# Patient Record
Sex: FEMALE | Race: WHITE
[De-identification: names, ages, dates, MRNs, and addresses within clinical notes are randomized per-mention and may not be internally consistent; named-entity substitution may affect disease eponyms.]

---

## 2017-04-11 ENCOUNTER — PRIOR ORIGINAL RECORDS (OUTPATIENT)
Dept: OTHER | Age: 72
End: 2017-04-11

## 2017-04-11 ENCOUNTER — OFFICE VISIT (OUTPATIENT)
Dept: HEMATOLOGY/ONCOLOGY | Facility: HOSPITAL | Age: 72
End: 2017-04-11
Attending: INTERNAL MEDICINE
Payer: MEDICARE

## 2017-04-11 ENCOUNTER — OFFICE VISIT (OUTPATIENT)
Dept: SURGERY | Facility: CLINIC | Age: 72
End: 2017-04-11

## 2017-04-11 VITALS
WEIGHT: 116 LBS | DIASTOLIC BLOOD PRESSURE: 82 MMHG | SYSTOLIC BLOOD PRESSURE: 136 MMHG | RESPIRATION RATE: 18 BRPM | HEART RATE: 70 BPM | OXYGEN SATURATION: 97 %

## 2017-04-11 VITALS — BODY MASS INDEX: 22.48 KG/M2 | HEIGHT: 60.04 IN | WEIGHT: 116 LBS

## 2017-04-11 DIAGNOSIS — Z51.11 ENCOUNTER FOR ANTINEOPLASTIC CHEMOTHERAPY: ICD-10-CM

## 2017-04-11 DIAGNOSIS — C77.3 BREAST CANCER METASTASIZED TO AXILLARY LYMPH NODE, RIGHT (HCC): Primary | ICD-10-CM

## 2017-04-11 DIAGNOSIS — C50.411 BREAST CANCER OF UPPER-OUTER QUADRANT OF RIGHT FEMALE BREAST (HCC): Primary | ICD-10-CM

## 2017-04-11 DIAGNOSIS — C50.911 BREAST CANCER METASTASIZED TO AXILLARY LYMPH NODE, RIGHT (HCC): Primary | ICD-10-CM

## 2017-04-11 PROBLEM — C50.919 BREAST CANCER METASTASIZED TO AXILLARY LYMPH NODE (HCC): Status: ACTIVE | Noted: 2017-04-11

## 2017-04-11 PROCEDURE — 83615 LACTATE (LD) (LDH) ENZYME: CPT

## 2017-04-11 PROCEDURE — 80053 COMPREHEN METABOLIC PANEL: CPT

## 2017-04-11 PROCEDURE — 85025 COMPLETE CBC W/AUTO DIFF WBC: CPT

## 2017-04-11 PROCEDURE — 99204 OFFICE O/P NEW MOD 45 MIN: CPT | Performed by: SURGERY

## 2017-04-11 PROCEDURE — 99205 OFFICE O/P NEW HI 60 MIN: CPT | Performed by: INTERNAL MEDICINE

## 2017-04-11 RX ORDER — LISINOPRIL 20 MG/1
20 TABLET ORAL DAILY
COMMUNITY
End: 2017-05-01 | Stop reason: DRUGHIGH

## 2017-04-11 RX ORDER — ATORVASTATIN CALCIUM 10 MG/1
10 TABLET, FILM COATED ORAL NIGHTLY
COMMUNITY
End: 2021-02-17

## 2017-04-11 RX ORDER — ASPIRIN 81 MG/1
81 TABLET, CHEWABLE ORAL DAILY
COMMUNITY
End: 2021-02-17

## 2017-04-11 NOTE — H&P
History provided by:patient and daughter  HPI:     HPI  The patient is a 75-year-old female seen at the request of her oncologist regarding a new diagnosis of metastatic right breast cancer. The patient lives in [place] during the winter.   She had a TIA Comment: 1 glass of wine per day    Allergies/Medications: Allergies: No Known Allergies      Review of Systems:   Review of Systems   Allergic/Immuno:  Review of patient's allergies performed.   Cardiovascular:  Negative for cool extremity and irregul exhibits no inverted nipple, no mass, no nipple discharge, no skin change and no tenderness. Breasts are symmetrical.   Genitourinary: No breast swelling.    Lymphadenopathy:        Head (right side): No submental, no submandibular, no preauricular, no post deep pectoral lymph nodes. Assessment/Plan:   Breast cancer metastatic to axillary lymph nodes    · I had a lengthy discussion with the patient and her daughter regarding the diagnosis of breast cancer.   We discussed the biology of breast cancer as José Vela MD

## 2017-04-12 ENCOUNTER — TELEPHONE (OUTPATIENT)
Dept: HEMATOLOGY/ONCOLOGY | Facility: HOSPITAL | Age: 72
End: 2017-04-12

## 2017-04-12 NOTE — CONSULTS
Cancer Center Report of Consultation    Patient Name: Will Sotelo   YOB: 1945   Medical Record Number: [id number]   CSN: [id number]   Consulting Physician: Darrick Fuentes MD  Referring Physician(s): No ref.  provider found  Date of Consult Cigarettes    Smokeless tobacco: Never Used    Alcohol Use: Yes  0.0 oz/week    0 Standard drinks or equivalent per week         Comment: 1 glass of wine per day    Drug Use: No    Sexual Activity: Not on file   Not on file  Other Topics Concern   None on Lab Results  Component Value Date    04/11/2017   K 3.9 04/11/2017    04/11/2017   CO2 30.0 04/11/2017   BUN 7* 04/11/2017   CREATSERUM 0.63 04/11/2017   GLU 83 04/11/2017   CA 9.4 04/11/2017   ALKPHO 81 04/11/2017   ALT 19 04/11/2017   A

## 2017-04-12 NOTE — TELEPHONE ENCOUNTER
Phoned patient and assisted with scheduling apts for  cario-oncology apt 4/20, echocardiogram 4/18, CT scan 4/18, chemotherapy education 4/20, and chemotherapy 4/24. Phoned Dr. Alejandrina Valdes office to schedule port placement for 4/21.  Patient with verbal unders

## 2017-04-12 NOTE — PROGRESS NOTES
Met with patient today in clinic and discussed newly diagnosed breast cancer. Introduced myself and explained my role as the breast navigator.  Explained the role of the physicians on her breast cancer care team. Reviewed over pathology report and discussed

## 2017-04-18 ENCOUNTER — OFFICE VISIT (OUTPATIENT)
Dept: HEMATOLOGY/ONCOLOGY | Facility: HOSPITAL | Age: 72
End: 2017-04-18
Attending: INTERNAL MEDICINE
Payer: MEDICARE

## 2017-04-18 ENCOUNTER — HOSPITAL ENCOUNTER (OUTPATIENT)
Dept: CT IMAGING | Facility: HOSPITAL | Age: 72
Discharge: HOME | End: 2017-04-18
Attending: INTERNAL MEDICINE
Payer: MEDICARE

## 2017-04-18 ENCOUNTER — HOSPITAL ENCOUNTER (OUTPATIENT)
Dept: CV DIAGNOSTICS | Facility: HOSPITAL | Age: 72
Discharge: HOME | End: 2017-04-18
Attending: INTERNAL MEDICINE
Payer: MEDICARE

## 2017-04-18 DIAGNOSIS — C50.411 BREAST CANCER OF UPPER-OUTER QUADRANT OF RIGHT FEMALE BREAST (HCC): ICD-10-CM

## 2017-04-18 DIAGNOSIS — Z71.89 OTHER SPECIFIED COUNSELING: ICD-10-CM

## 2017-04-18 DIAGNOSIS — C50.911 BREAST CANCER METASTASIZED TO AXILLARY LYMPH NODE, RIGHT (HCC): Primary | ICD-10-CM

## 2017-04-18 DIAGNOSIS — Z51.11 ENCOUNTER FOR ANTINEOPLASTIC CHEMOTHERAPY: ICD-10-CM

## 2017-04-18 DIAGNOSIS — C77.3 BREAST CANCER METASTASIZED TO AXILLARY LYMPH NODE, RIGHT (HCC): Primary | ICD-10-CM

## 2017-04-18 PROCEDURE — 74177 CT ABD & PELVIS W/CONTRAST: CPT

## 2017-04-18 PROCEDURE — 71260 CT THORAX DX C+: CPT

## 2017-04-18 PROCEDURE — 99215 OFFICE O/P EST HI 40 MIN: CPT | Performed by: CLINICAL NURSE SPECIALIST

## 2017-04-18 PROCEDURE — 93306 TTE W/DOPPLER COMPLETE: CPT | Performed by: CLINICAL NURSE SPECIALIST

## 2017-04-18 PROCEDURE — 93306 TTE W/DOPPLER COMPLETE: CPT

## 2017-04-18 RX ORDER — DEXAMETHASONE 4 MG/1
8 TABLET ORAL 2 TIMES DAILY WITH MEALS
Qty: 24 TABLET | Refills: 4 | Status: SHIPPED | OUTPATIENT
Start: 2017-04-18 | End: 2017-08-14 | Stop reason: ALTCHOICE

## 2017-04-18 RX ORDER — ONDANSETRON HYDROCHLORIDE 8 MG/1
8 TABLET, FILM COATED ORAL EVERY 8 HOURS PRN
Qty: 30 TABLET | Refills: 3 | Status: SHIPPED | OUTPATIENT
Start: 2017-04-18 | End: 2017-07-01

## 2017-04-18 RX ORDER — PROCHLORPERAZINE MALEATE 10 MG
10 TABLET ORAL EVERY 6 HOURS PRN
Qty: 30 TABLET | Refills: 3 | Status: SHIPPED | OUTPATIENT
Start: 2017-04-18 | End: 2017-04-18

## 2017-04-19 DIAGNOSIS — C50.911 BREAST CANCER METASTASIZED TO AXILLARY LYMPH NODE, RIGHT (HCC): Primary | ICD-10-CM

## 2017-04-19 DIAGNOSIS — C77.3 BREAST CANCER METASTASIZED TO AXILLARY LYMPH NODE, RIGHT (HCC): Primary | ICD-10-CM

## 2017-04-20 ENCOUNTER — PRIOR ORIGINAL RECORDS (OUTPATIENT)
Dept: OTHER | Age: 72
End: 2017-04-20

## 2017-04-20 ENCOUNTER — APPOINTMENT (OUTPATIENT)
Dept: HEMATOLOGY/ONCOLOGY | Facility: HOSPITAL | Age: 72
End: 2017-04-20
Attending: INTERNAL MEDICINE
Payer: MEDICARE

## 2017-04-20 PROBLEM — Z71.89 OTHER SPECIFIED COUNSELING: Status: ACTIVE | Noted: 2017-04-20

## 2017-04-20 RX ORDER — PROCHLORPERAZINE MALEATE 10 MG
TABLET ORAL
Qty: 368 TABLET | Refills: 3 | OUTPATIENT
Start: 2017-04-20

## 2017-04-20 RX ORDER — PROCHLORPERAZINE MALEATE 10 MG
TABLET ORAL
Qty: 90 TABLET | Refills: 3 | Status: SHIPPED | OUTPATIENT
Start: 2017-04-20 | End: 2017-07-17 | Stop reason: ALTCHOICE

## 2017-04-20 NOTE — PROGRESS NOTES
IV Chemotherapy Education    Patient:Geovanna Marie    Date:  4/17/17  Diagnosis: breast cancer   Caregivers present: daughter    Drug names:  Taxotere, Carboplatin, Herceptin, Perjeta    Treatment Effects on Bone Marrow:  Chemotherapy action on cancer / diet, and activities. Chemotherapy Consent Form signed by the patient.   I spent a total of 60 minutes with the patient, 100 % of that time was spent counseling patient regarding the above documented side effects and management, when to call provider a

## 2017-04-21 ENCOUNTER — HOSPITAL ENCOUNTER (OUTPATIENT)
Dept: MAMMOGRAPHY | Age: 72
Discharge: HOME | End: 2017-04-21
Attending: CLINICAL NURSE SPECIALIST
Payer: MEDICARE

## 2017-04-21 ENCOUNTER — HOSPITAL ENCOUNTER (OUTPATIENT)
Dept: ULTRASOUND IMAGING | Age: 72
Discharge: HOME | End: 2017-04-21
Attending: CLINICAL NURSE SPECIALIST
Payer: MEDICARE

## 2017-04-21 DIAGNOSIS — C50.911 BREAST CANCER METASTASIZED TO AXILLARY LYMPH NODE, RIGHT (HCC): ICD-10-CM

## 2017-04-21 DIAGNOSIS — C77.3 BREAST CANCER METASTASIZED TO AXILLARY LYMPH NODE, RIGHT (HCC): ICD-10-CM

## 2017-04-21 PROCEDURE — 77065 DX MAMMO INCL CAD UNI: CPT

## 2017-04-21 PROCEDURE — 88305 TISSUE EXAM BY PATHOLOGIST: CPT | Performed by: CLINICAL NURSE SPECIALIST

## 2017-04-21 PROCEDURE — 19083 BX BREAST 1ST LESION US IMAG: CPT

## 2017-04-21 PROCEDURE — 88377 M/PHMTRC ALYS ISHQUANT/SEMIQ: CPT | Performed by: CLINICAL NURSE SPECIALIST

## 2017-04-21 PROCEDURE — 88360 TUMOR IMMUNOHISTOCHEM/MANUAL: CPT | Performed by: CLINICAL NURSE SPECIALIST

## 2017-04-21 NOTE — IMAGING NOTE
Pt has a known breast cancer and is starting chemotherapy on Monday. Port placed in L chest yesterday and pt still in pain.   Assisted Dr. Peterson Coyne with US guided breast biopsy of satellite mass at 0930 in right breast.  Procedure explained, all questions ans

## 2017-04-24 ENCOUNTER — OFFICE VISIT (OUTPATIENT)
Dept: HEMATOLOGY/ONCOLOGY | Facility: HOSPITAL | Age: 72
End: 2017-04-24
Attending: INTERNAL MEDICINE
Payer: MEDICARE

## 2017-04-24 ENCOUNTER — SOCIAL WORK SERVICES (OUTPATIENT)
Dept: HEMATOLOGY/ONCOLOGY | Facility: HOSPITAL | Age: 72
End: 2017-04-24

## 2017-04-24 VITALS
WEIGHT: 117.63 LBS | OXYGEN SATURATION: 95 % | SYSTOLIC BLOOD PRESSURE: 143 MMHG | BODY MASS INDEX: 23 KG/M2 | HEART RATE: 74 BPM | TEMPERATURE: 98 F | RESPIRATION RATE: 18 BRPM | DIASTOLIC BLOOD PRESSURE: 83 MMHG

## 2017-04-24 DIAGNOSIS — C50.911 BREAST CANCER METASTASIZED TO AXILLARY LYMPH NODE, RIGHT (HCC): Primary | ICD-10-CM

## 2017-04-24 DIAGNOSIS — C77.3 BREAST CANCER METASTASIZED TO AXILLARY LYMPH NODE, RIGHT (HCC): Primary | ICD-10-CM

## 2017-04-24 PROCEDURE — 85025 COMPLETE CBC W/AUTO DIFF WBC: CPT

## 2017-04-24 PROCEDURE — 96417 CHEMO IV INFUS EACH ADDL SEQ: CPT

## 2017-04-24 PROCEDURE — 80053 COMPREHEN METABOLIC PANEL: CPT

## 2017-04-24 PROCEDURE — 96413 CHEMO IV INFUSION 1 HR: CPT

## 2017-04-24 PROCEDURE — 99214 OFFICE O/P EST MOD 30 MIN: CPT | Performed by: INTERNAL MEDICINE

## 2017-04-24 PROCEDURE — 96375 TX/PRO/DX INJ NEW DRUG ADDON: CPT

## 2017-04-24 RX ORDER — DIPHENHYDRAMINE HCL 25 MG
25 CAPSULE ORAL ONCE
Status: COMPLETED | OUTPATIENT
Start: 2017-04-24 | End: 2017-04-24

## 2017-04-24 RX ORDER — PANTOPRAZOLE SODIUM 40 MG/1
40 TABLET, DELAYED RELEASE ORAL
Qty: 30 TABLET | Refills: 6 | Status: SHIPPED | OUTPATIENT
Start: 2017-04-24 | End: 2018-06-21

## 2017-04-24 RX ORDER — ALBUTEROL SULFATE 90 UG/1
AEROSOL, METERED RESPIRATORY (INHALATION) EVERY 6 HOURS PRN
COMMUNITY
End: 2021-02-17

## 2017-04-24 RX ORDER — ACETAMINOPHEN 325 MG/1
650 TABLET ORAL ONCE
Status: COMPLETED | OUTPATIENT
Start: 2017-04-24 | End: 2017-04-24

## 2017-04-24 RX ORDER — ACETAMINOPHEN 500 MG
1000 TABLET ORAL ONCE
Status: COMPLETED | OUTPATIENT
Start: 2017-04-24 | End: 2017-04-24

## 2017-04-24 RX ORDER — SUCRALFATE ORAL 1 G/10ML
1 SUSPENSION ORAL
COMMUNITY
End: 2017-06-30 | Stop reason: ALTCHOICE

## 2017-04-24 RX ORDER — PANTOPRAZOLE SODIUM 40 MG/1
40 TABLET, DELAYED RELEASE ORAL
COMMUNITY
End: 2017-04-24

## 2017-04-24 RX ADMIN — ACETAMINOPHEN 650 MG: 325 TABLET ORAL at 10:45:00

## 2017-04-24 RX ADMIN — ACETAMINOPHEN 1000 MG: 500 MG TABLET ORAL at 16:01:00

## 2017-04-24 RX ADMIN — DIPHENHYDRAMINE HCL 25 MG: 25 MG CAPSULE ORAL at 10:45:00

## 2017-04-24 NOTE — PROGRESS NOTES
Education Record    Learner:  Patient    Disease / Emy Moses cancer metastasized to axillary lymph node, right    Barriers / Limitations:  None   Comments:    Method:  Brief focused   Comments:    General Topics:  Infection, Medication, Pain, Precau

## 2017-04-24 NOTE — PROGRESS NOTES
Cancer Center Progress Note    Problem List:      Patient Active Problem List:     De Quervain's syndrome (tenosynovitis)     Osteopenia     Breast cancer metastasized to axillary lymph node ([address]     Other specified counseling      Interim History:    She mg by mouth daily. Disp:  Rfl:    Atorvastatin Calcium 10 MG Oral Tab Take 10 mg by mouth nightly. Disp:  Rfl:    aspirin 81 MG Oral Chew Tab Chew by mouth daily.  Disp:  Rfl:          Vital Signs:      Height: --  Weight: 53.343 kg (117 lb 9.6 oz) (04/24 0 these. She is ready to start the chemotherapy. She was instructed to call us if she has any concerns. I will have her get a weekly cbc with the first cycle. She will see the APN at day 8.  I will have her go back on the ASA 81 mg daily but we will watch her

## 2017-04-25 ENCOUNTER — TELEPHONE (OUTPATIENT)
Dept: HEMATOLOGY/ONCOLOGY | Facility: HOSPITAL | Age: 72
End: 2017-04-25

## 2017-04-25 NOTE — TELEPHONE ENCOUNTER
Date of Treatment: 4/24/17                               Type of Chemo: TCHP + Neulasta OnPro    Comments: LM for patient at listed number- identified on voice mail.     Recommendations: Encouraged patient to call back Dr. Sandra Sotelo triage nurses with any issues

## 2017-04-27 ENCOUNTER — TELEPHONE (OUTPATIENT)
Dept: HEMATOLOGY/ONCOLOGY | Facility: HOSPITAL | Age: 72
End: 2017-04-27

## 2017-04-27 ENCOUNTER — HOSPITAL ENCOUNTER (OUTPATIENT)
Dept: GENERAL RADIOLOGY | Facility: HOSPITAL | Age: 72
Discharge: HOME | End: 2017-04-27
Attending: CLINICAL NURSE SPECIALIST
Payer: MEDICARE

## 2017-04-27 ENCOUNTER — OFFICE VISIT (OUTPATIENT)
Dept: HEMATOLOGY/ONCOLOGY | Facility: HOSPITAL | Age: 72
End: 2017-04-27
Attending: INTERNAL MEDICINE
Payer: MEDICARE

## 2017-04-27 VITALS
BODY MASS INDEX: 22.96 KG/M2 | TEMPERATURE: 98 F | DIASTOLIC BLOOD PRESSURE: 69 MMHG | WEIGHT: 118.5 LBS | OXYGEN SATURATION: 95 % | HEIGHT: 60.04 IN | RESPIRATION RATE: 18 BRPM | HEART RATE: 77 BPM | SYSTOLIC BLOOD PRESSURE: 133 MMHG

## 2017-04-27 DIAGNOSIS — C50.911 BREAST CANCER METASTASIZED TO AXILLARY LYMPH NODE, RIGHT (HCC): ICD-10-CM

## 2017-04-27 DIAGNOSIS — C50.911 BREAST CANCER METASTASIZED TO AXILLARY LYMPH NODE, RIGHT (HCC): Primary | ICD-10-CM

## 2017-04-27 DIAGNOSIS — C77.3 BREAST CANCER METASTASIZED TO AXILLARY LYMPH NODE, RIGHT (HCC): Primary | ICD-10-CM

## 2017-04-27 DIAGNOSIS — C50.411 MALIGNANT NEOPLASM OF UPPER-OUTER QUADRANT OF RIGHT FEMALE BREAST, UNSPECIFIED ESTROGEN RECEPTOR STATUS (HCC): Primary | ICD-10-CM

## 2017-04-27 DIAGNOSIS — R06.02 SHORTNESS OF BREATH: ICD-10-CM

## 2017-04-27 DIAGNOSIS — C77.3 BREAST CANCER METASTASIZED TO AXILLARY LYMPH NODE, RIGHT (HCC): ICD-10-CM

## 2017-04-27 DIAGNOSIS — J44.9 CHRONIC OBSTRUCTIVE PULMONARY DISEASE, UNSPECIFIED COPD TYPE (HCC): ICD-10-CM

## 2017-04-27 LAB
ALBUMIN: 3.8 G/DL
ALKALINE PHOSPHATATE(ALK PHOS): 81 IU/L
BILIRUBIN TOTAL: 0.3 MG/DL
BUN: 7 MG/DL
CALCIUM: 9.4 MG/DL
CHLORIDE: 106 MEQ/L
CREATININE, SERUM: 0.63 MG/DL
GLUCOSE: 83 MG/DL
HEMATOCRIT: 39.7 %
HEMOGLOBIN: 13.2 G/DL
LDH: 160 IU/L
PLATELETS: 266 K/UL
POTASSIUM, SERUM: 3.9 MEQ/L
PROTEIN, TOTAL: 7.4 G/DL
RED BLOOD COUNT: 4.25 X 10-6/U
SGOT (AST): 18 IU/L
SGPT (ALT): 19 IU/L
SODIUM: 140 MEQ/L
WHITE BLOOD COUNT: 10 X 10-3/U

## 2017-04-27 PROCEDURE — 99213 OFFICE O/P EST LOW 20 MIN: CPT | Performed by: CLINICAL NURSE SPECIALIST

## 2017-04-27 PROCEDURE — 71020 XR CHEST PA + LAT CHEST (CPT=71020): CPT

## 2017-04-27 NOTE — TELEPHONE ENCOUNTER
Phoned patient with breast biopsy results from right breast biopsy done on 4/21. Explained that the area biopsied was found to be Her 2 positive breast cancer and like the other previous sites that were biopsied.  Patient started chemotherapy on 4/24 and as

## 2017-04-30 ENCOUNTER — SNF/IP PROF CHARGE ONLY (OUTPATIENT)
Dept: HEMATOLOGY/ONCOLOGY | Facility: HOSPITAL | Age: 72
End: 2017-04-30

## 2017-04-30 DIAGNOSIS — C77.3 BREAST CANCER METASTASIZED TO AXILLARY LYMPH NODE, RIGHT (HCC): Primary | ICD-10-CM

## 2017-04-30 DIAGNOSIS — C50.911 BREAST CANCER METASTASIZED TO AXILLARY LYMPH NODE, RIGHT (HCC): Primary | ICD-10-CM

## 2017-04-30 PROCEDURE — G9678 ONCOLOGY CARE MODEL SERVICE: HCPCS | Performed by: INTERNAL MEDICINE

## 2017-05-01 ENCOUNTER — OFFICE VISIT (OUTPATIENT)
Dept: HEMATOLOGY/ONCOLOGY | Facility: HOSPITAL | Age: 72
End: 2017-05-01
Attending: INTERNAL MEDICINE
Payer: MEDICARE

## 2017-05-01 VITALS
HEIGHT: 60.04 IN | HEART RATE: 74 BPM | BODY MASS INDEX: 21.8 KG/M2 | DIASTOLIC BLOOD PRESSURE: 46 MMHG | OXYGEN SATURATION: 94 % | WEIGHT: 112.5 LBS | SYSTOLIC BLOOD PRESSURE: 93 MMHG | RESPIRATION RATE: 18 BRPM | TEMPERATURE: 99 F

## 2017-05-01 DIAGNOSIS — C77.3 BREAST CANCER METASTASIZED TO AXILLARY LYMPH NODE, RIGHT (HCC): ICD-10-CM

## 2017-05-01 DIAGNOSIS — M79.606 LEG PAIN: Primary | ICD-10-CM

## 2017-05-01 DIAGNOSIS — C50.911 BREAST CANCER METASTASIZED TO AXILLARY LYMPH NODE, RIGHT (HCC): ICD-10-CM

## 2017-05-01 DIAGNOSIS — R25.2 LEG CRAMPING: ICD-10-CM

## 2017-05-01 DIAGNOSIS — C50.911 BREAST CANCER METASTASIZED TO AXILLARY LYMPH NODE, RIGHT (HCC): Primary | ICD-10-CM

## 2017-05-01 DIAGNOSIS — C77.3 BREAST CANCER METASTASIZED TO AXILLARY LYMPH NODE, RIGHT (HCC): Primary | ICD-10-CM

## 2017-05-01 PROCEDURE — 99214 OFFICE O/P EST MOD 30 MIN: CPT | Performed by: NURSE PRACTITIONER

## 2017-05-01 PROCEDURE — 85025 COMPLETE CBC W/AUTO DIFF WBC: CPT

## 2017-05-01 PROCEDURE — 96360 HYDRATION IV INFUSION INIT: CPT

## 2017-05-01 PROCEDURE — 80048 BASIC METABOLIC PNL TOTAL CA: CPT

## 2017-05-01 PROCEDURE — 85378 FIBRIN DEGRADE SEMIQUANT: CPT

## 2017-05-01 PROCEDURE — 85027 COMPLETE CBC AUTOMATED: CPT

## 2017-05-01 PROCEDURE — 96361 HYDRATE IV INFUSION ADD-ON: CPT

## 2017-05-01 PROCEDURE — 85007 BL SMEAR W/DIFF WBC COUNT: CPT

## 2017-05-01 RX ORDER — DIAZEPAM 5 MG/1
TABLET ORAL
Refills: 0 | COMMUNITY
Start: 2017-04-03 | End: 2017-05-15

## 2017-05-01 RX ORDER — FLUTICASONE PROPIONATE 50 MCG
SPRAY, SUSPENSION (ML) NASAL AS NEEDED
COMMUNITY
Start: 2017-03-18 | End: 2018-04-05

## 2017-05-01 RX ORDER — LISINOPRIL 10 MG/1
TABLET ORAL
Refills: 1 | COMMUNITY
Start: 2017-03-07 | End: 2017-06-30 | Stop reason: DRUGHIGH

## 2017-05-01 RX ORDER — ACETAMINOPHEN AND CODEINE PHOSPHATE 300; 30 MG/1; MG/1
TABLET ORAL
COMMUNITY
Start: 2017-04-20 | End: 2017-07-05

## 2017-05-01 NOTE — PROGRESS NOTES
Patient is here for day 8 APN assessment. Received first cycle of TCHP on 4/24. Pt developed constipation the first few days after chemo then took Senokot S. Patient has been having loose stools since. Legs feel very achy and painful.  Not sleeping well, up

## 2017-05-01 NOTE — PROGRESS NOTES
Education Record    Learner:  Patient    Disease / Diagnosis:    Barriers / Limitations:  None   Comments:    Method:  Discussion   Comments:    General Topics:  Procedure, Plan of care reviewed and Other:  appointments reviewed   Comments:    Outcome:   Sh

## 2017-05-01 NOTE — PROGRESS NOTES
ANP Visit Note    Patient Name: Surinder Gilbert   YOB: 1945   Medical Record Number: [id number]   CSN: [id number]   Date of visit: 5/1/2017       Chief Complaint/Reason for Visit: Follow up chemotherapy     History of Present Illness: Patient Comment: 1 glass of wine per day    Drug Use: No    Sexual Activity: Not on file   Not on file  Other Topics Concern   None on file     Social History Narrative       Medications:    Current outpatient prescriptions:   •  Albuterol Sulfate HFA (PRO Neck: No JVD. No palpable lymphadenopathy. Neck is supple. Chest: Clear to auscultation. Heart: Regular rate and rhythm. Abdomen: Soft, non tender with good bowel sounds. Extremities: Pedal pulses are present. No edema.   Neurological: Grossly intact Final   CO2 Date: 04/24/2017   Value: 24.0  Ref Range 22.0-32.0 mmol/L Status: Final   WBC Date: 04/24/2017   Value: 15.4* Ref Range 4.0-13.0 x10(3) uL Status: Final   RBC Date: 04/24/2017   Value: 4.12  Ref Range 3.80-5.10 x10(6)uL Status: Final   HGB Leroy Impression/Plan:    Decreased oral intake: fluids today 1 liter . 9    Hypotension: IV  fluids today in clinic encouraged patient to push fluids and stay hydrated     Diarrhea: Imodium     Claritin: bone pain     Breast Cancer: s/p 1 week of TCHP

## 2017-05-01 NOTE — PATIENT INSTRUCTIONS
1. Use Spacer for inhaler. 2. Claritin nightly for bone pain and ok to treat Ibuprofen with food occasionally. 3. If shortness of breath increases call [phone] or go to the Emergency Room. 4. Use Imodium for diarrhea as needed.

## 2017-05-08 ENCOUNTER — LAB ENCOUNTER (OUTPATIENT)
Dept: LAB | Facility: HOSPITAL | Age: 72
End: 2017-05-08
Attending: INTERNAL MEDICINE
Payer: MEDICARE

## 2017-05-08 DIAGNOSIS — C80.1 DUCTAL CARCINOMA (HCC): Primary | ICD-10-CM

## 2017-05-08 PROBLEM — R06.00 DYSPNEA: Status: ACTIVE | Noted: 2017-05-08

## 2017-05-08 PROBLEM — J44.9 COPD (CHRONIC OBSTRUCTIVE PULMONARY DISEASE) (HCC): Status: ACTIVE | Noted: 2017-05-08

## 2017-05-08 PROCEDURE — 88321 CONSLTJ&REPRT SLD PREP ELSWR: CPT

## 2017-05-08 NOTE — PROGRESS NOTES
[facility] Progress Note    Patient Name: Surinder Gilbert   YOB: 1945   Medical Record Number: [id number]   CSN: [id number]   Date of visit: 4/27/2017   Provider: JORGE Steiner  Referring Physician: No ref.  provider found    Probl but she never started it.     Allergies:  No Known Allergies    Vital Signs:  /69 mmHg  Pulse 77  Temp(Src) 97.9 °F (36.6 °C) (Tympanic)  Resp 18  Ht 1.525 m (5' 0.04\")  Wt 53.751 kg (118 lb 8 oz)  BMI 23.11 kg/m2  SpO2 95%    Medications:    Current acute distress.     HEENT:  Anicteric, conjunctivae and sclerae clear, no sinus tenderness, no oropharyngeal lesion/thrush, mucous membranes are moist.  Neck:  Supple, no tenderness, no masses or adenopathy  Lungs:  Clear to auscultation bilaterally, dimini

## 2017-05-09 ENCOUNTER — TELEPHONE (OUTPATIENT)
Dept: HEMATOLOGY/ONCOLOGY | Facility: HOSPITAL | Age: 72
End: 2017-05-09

## 2017-05-10 ENCOUNTER — TELEPHONE (OUTPATIENT)
Dept: HEMATOLOGY/ONCOLOGY | Facility: HOSPITAL | Age: 72
End: 2017-05-10

## 2017-05-10 NOTE — TELEPHONE ENCOUNTER
Patient's daughter Leodan Ernst phoned and explained that Raul Carpenter has been having Diarrhea and stopped taking the Imodium and the diarrhea returned. She does not have abdominal pain but does have some pain with urine urgency. Patient denies fever.  Explained crissy

## 2017-05-10 NOTE — TELEPHONE ENCOUNTER
Patient's daughter phoned back and explained that Zach Coronado was feeling much better today and she did not have any more episodes of diarrhea.  She stated that she had some gas pain but was feeling better today and would like to wait until Monday 5/15 for her

## 2017-05-11 ENCOUNTER — TELEPHONE (OUTPATIENT)
Dept: HEMATOLOGY/ONCOLOGY | Facility: HOSPITAL | Age: 72
End: 2017-05-11

## 2017-05-11 ENCOUNTER — HOSPITAL ENCOUNTER (OUTPATIENT)
Dept: GENERAL RADIOLOGY | Facility: HOSPITAL | Age: 72
Discharge: HOME | End: 2017-05-11
Attending: CLINICAL NURSE SPECIALIST
Payer: MEDICARE

## 2017-05-11 ENCOUNTER — OFFICE VISIT (OUTPATIENT)
Dept: HEMATOLOGY/ONCOLOGY | Facility: HOSPITAL | Age: 72
End: 2017-05-11
Attending: INTERNAL MEDICINE
Payer: MEDICARE

## 2017-05-11 VITALS
OXYGEN SATURATION: 97 % | HEIGHT: 60.04 IN | TEMPERATURE: 98 F | WEIGHT: 116 LBS | HEART RATE: 76 BPM | RESPIRATION RATE: 18 BRPM | BODY MASS INDEX: 22.48 KG/M2 | DIASTOLIC BLOOD PRESSURE: 63 MMHG | SYSTOLIC BLOOD PRESSURE: 104 MMHG

## 2017-05-11 DIAGNOSIS — R10.9 ABDOMINAL PAIN, UNSPECIFIED LOCATION: ICD-10-CM

## 2017-05-11 DIAGNOSIS — C77.3 BREAST CANCER METASTASIZED TO AXILLARY LYMPH NODE, UNSPECIFIED LATERALITY (HCC): ICD-10-CM

## 2017-05-11 DIAGNOSIS — R19.7 DIARRHEA, UNSPECIFIED TYPE: ICD-10-CM

## 2017-05-11 DIAGNOSIS — C50.911 BREAST CANCER METASTASIZED TO AXILLARY LYMPH NODE, RIGHT (HCC): Primary | ICD-10-CM

## 2017-05-11 DIAGNOSIS — C77.3 BREAST CANCER METASTASIZED TO AXILLARY LYMPH NODE, UNSPECIFIED LATERALITY (HCC): Primary | ICD-10-CM

## 2017-05-11 DIAGNOSIS — R10.30 LOWER ABDOMINAL PAIN: ICD-10-CM

## 2017-05-11 DIAGNOSIS — C50.919 BREAST CANCER METASTASIZED TO AXILLARY LYMPH NODE, UNSPECIFIED LATERALITY (HCC): Primary | ICD-10-CM

## 2017-05-11 DIAGNOSIS — C50.919 BREAST CANCER METASTASIZED TO AXILLARY LYMPH NODE, UNSPECIFIED LATERALITY (HCC): ICD-10-CM

## 2017-05-11 DIAGNOSIS — C77.3 BREAST CANCER METASTASIZED TO AXILLARY LYMPH NODE, RIGHT (HCC): Primary | ICD-10-CM

## 2017-05-11 DIAGNOSIS — E86.0 DEHYDRATION: Primary | ICD-10-CM

## 2017-05-11 PROCEDURE — 74020 XR ABDOMEN, OBSTRUCTIVE SERIES (CPT=74020): CPT | Performed by: CLINICAL NURSE SPECIALIST

## 2017-05-11 PROCEDURE — 99214 OFFICE O/P EST MOD 30 MIN: CPT | Performed by: CLINICAL NURSE SPECIALIST

## 2017-05-11 PROCEDURE — 87086 URINE CULTURE/COLONY COUNT: CPT

## 2017-05-11 PROCEDURE — 83735 ASSAY OF MAGNESIUM: CPT

## 2017-05-11 PROCEDURE — 81003 URINALYSIS AUTO W/O SCOPE: CPT

## 2017-05-11 PROCEDURE — 96360 HYDRATION IV INFUSION INIT: CPT

## 2017-05-11 PROCEDURE — 80053 COMPREHEN METABOLIC PANEL: CPT

## 2017-05-11 PROCEDURE — 96361 HYDRATE IV INFUSION ADD-ON: CPT

## 2017-05-11 PROCEDURE — 85025 COMPLETE CBC W/AUTO DIFF WBC: CPT

## 2017-05-11 RX ORDER — DICYCLOMINE HCL 20 MG
20 TABLET ORAL 4 TIMES DAILY PRN
Qty: 30 TABLET | Refills: 0 | Status: SHIPPED | OUTPATIENT
Start: 2017-05-11 | End: 2017-05-25

## 2017-05-11 RX ORDER — ONDANSETRON HYDROCHLORIDE 8 MG/1
8 TABLET, FILM COATED ORAL ONCE
Status: COMPLETED | OUTPATIENT
Start: 2017-05-11 | End: 2017-05-11

## 2017-05-11 RX ADMIN — ONDANSETRON HYDROCHLORIDE 8 MG: 8 TABLET, FILM COATED ORAL at 13:25:00

## 2017-05-11 NOTE — PROGRESS NOTES
[facility] Progress Note    Patient Name: Javier Myers   YOB: 1945   Medical Record Number: [id number]   CSN: [id number]   Date of visit: 5/11/2017   Provider: JORGE Panchal  Referring Physician: No ref.  provider found    Probl 104/63 mmHg  Pulse 76  Temp(Src) 97.8 °F (36.6 °C) (Tympanic)  Resp 18  Ht 1.525 m (5' 0.04\")  Wt 52.617 kg (116 lb)  BMI 22.62 kg/m2  SpO2 97%    Medications:    Current outpatient prescriptions:   •  Dicyclomine HCl 20 MG Oral Tab, Take 1 tablet (20 mg Examination:  General: Awake, alert, oriented x3, no acute distress.     HEENT:  Anicteric, conjunctivae and sclerae clear, no sinus tenderness, no oropharyngeal lesion/thrush, mucous membranes are moist.  Neck:  Supple, no tenderness, no masses or adenopat %   Eosinophil % 0.7 %   Basophil % 0.3 %   Immature Granulocyte % 0.3 %     Imaging:  PROCEDURE:  OBSTRUCTIVE SERIES      TECHNIQUE:  Supine and upright views of the abdomen and pelvis were obtained.      COMPARISON:  [facility] , CT CHEST+ABDOMEN+PELVIS(ALL UTI.    Breast cancef:  S/p cycle 1 TCHP. Mild dehydration:  IV hydration 1liter.       A total of 30 minutes were spent with the patient >50% spent counseling on chemo side effects, symptom management, results review, discussion of plan of care, and

## 2017-05-11 NOTE — TELEPHONE ENCOUNTER
Patient daughter states the patient has had abdominal pain the last two night. She states the pain was 10/10. She currently has diarrhea. Yahir PATEL informed.  Patient daughter was instructed to have the patient take a Xray of the abdomen and to come

## 2017-05-15 ENCOUNTER — OFFICE VISIT (OUTPATIENT)
Dept: HEMATOLOGY/ONCOLOGY | Facility: HOSPITAL | Age: 72
End: 2017-05-15
Attending: INTERNAL MEDICINE
Payer: MEDICARE

## 2017-05-15 VITALS
WEIGHT: 118.81 LBS | TEMPERATURE: 96 F | OXYGEN SATURATION: 94 % | DIASTOLIC BLOOD PRESSURE: 92 MMHG | RESPIRATION RATE: 20 BRPM | HEART RATE: 76 BPM | SYSTOLIC BLOOD PRESSURE: 160 MMHG | BODY MASS INDEX: 23.02 KG/M2 | HEIGHT: 60.24 IN

## 2017-05-15 DIAGNOSIS — C50.911 BREAST CANCER METASTASIZED TO AXILLARY LYMPH NODE, RIGHT (HCC): Primary | ICD-10-CM

## 2017-05-15 DIAGNOSIS — C77.3 BREAST CANCER METASTASIZED TO AXILLARY LYMPH NODE, RIGHT (HCC): Primary | ICD-10-CM

## 2017-05-15 PROCEDURE — 96413 CHEMO IV INFUSION 1 HR: CPT

## 2017-05-15 PROCEDURE — 96375 TX/PRO/DX INJ NEW DRUG ADDON: CPT

## 2017-05-15 PROCEDURE — 99214 OFFICE O/P EST MOD 30 MIN: CPT | Performed by: INTERNAL MEDICINE

## 2017-05-15 PROCEDURE — 80053 COMPREHEN METABOLIC PANEL: CPT

## 2017-05-15 PROCEDURE — 85025 COMPLETE CBC W/AUTO DIFF WBC: CPT

## 2017-05-15 PROCEDURE — 96417 CHEMO IV INFUS EACH ADDL SEQ: CPT

## 2017-05-15 NOTE — PROGRESS NOTES
Cancer Center Progress Note    Problem List:      Patient Active Problem List:     De Quervain's syndrome (tenosynovitis)     Osteopenia     Breast cancer metastasized to axillary lymph node ([facility] [address]     Other specified counseling     Dyspnea     COPD (chroni rate and rhythm. Breast: The right breast has ecchymosis from the recent biopsy procedure. Abdomen: Soft, non tender with good bowel sounds. Extremities: No edema. No calf tenderness.       Labs:       Lab Results  Component Value Date   WBC 9.6 05/15/2

## 2017-05-15 NOTE — PROGRESS NOTES
Education Record    Learner:  Patient and Family Member    Disease / Diagnosis: breast ca    Barriers / Limitations:  None   Comments:    Method:  Brief focused, Discussion and Reinforcement   Comments:    General Topics:  Diet, Medication, Precautions, Pr

## 2017-05-17 ENCOUNTER — APPOINTMENT (OUTPATIENT)
Dept: HEMATOLOGY/ONCOLOGY | Facility: HOSPITAL | Age: 72
End: 2017-05-17
Attending: INTERNAL MEDICINE
Payer: MEDICARE

## 2017-05-20 ENCOUNTER — HOSPITAL ENCOUNTER (EMERGENCY)
Facility: HOSPITAL | Age: 72
Discharge: HOME | End: 2017-05-20
Attending: EMERGENCY MEDICINE
Payer: MEDICARE

## 2017-05-20 VITALS
HEIGHT: 60 IN | DIASTOLIC BLOOD PRESSURE: 61 MMHG | BODY MASS INDEX: 22.97 KG/M2 | RESPIRATION RATE: 18 BRPM | OXYGEN SATURATION: 98 % | TEMPERATURE: 97 F | HEART RATE: 69 BPM | SYSTOLIC BLOOD PRESSURE: 101 MMHG | WEIGHT: 117 LBS

## 2017-05-20 DIAGNOSIS — E86.0 DEHYDRATION: Primary | ICD-10-CM

## 2017-05-20 PROCEDURE — 81003 URINALYSIS AUTO W/O SCOPE: CPT | Performed by: EMERGENCY MEDICINE

## 2017-05-20 PROCEDURE — 80053 COMPREHEN METABOLIC PANEL: CPT

## 2017-05-20 PROCEDURE — 85025 COMPLETE CBC W/AUTO DIFF WBC: CPT

## 2017-05-20 PROCEDURE — 99284 EMERGENCY DEPT VISIT MOD MDM: CPT

## 2017-05-20 PROCEDURE — 96360 HYDRATION IV INFUSION INIT: CPT

## 2017-05-20 PROCEDURE — 96361 HYDRATE IV INFUSION ADD-ON: CPT

## 2017-05-20 RX ORDER — ONDANSETRON 4 MG/1
4 TABLET, ORALLY DISINTEGRATING ORAL EVERY 4 HOURS PRN
Qty: 10 TABLET | Refills: 0 | Status: SHIPPED | OUTPATIENT
Start: 2017-05-20 | End: 2017-05-26

## 2017-05-20 NOTE — ED PROVIDER NOTES
Patient Seen in: [facility] Emergency Department    History   Patient presents with:  Nausea/Vomiting/Diarrhea (gastrointestinal)    Stated Complaint: diarrhea    HPI    64-year-old female presents emergency department has had diarrhea since early t dexamethasone (DECADRON) 4 MG tablet,  Take 2 tablets (8 mg total) by mouth 2 (two) times daily with meals. Atorvastatin Calcium 10 MG Oral Tab,  Take 10 mg by mouth nightly. aspirin 81 MG Oral Chew Tab,  Chew by mouth daily.    BONIVA 3 MG/3ML IV KIT, nontender nondistended, no rebound no guarding  no hepatosplenomegaly bowel sounds are present , no pulsatile mass  Extremities: No clubbing cyanosis or edema 2+ distal pulses.   Neuro: Cranial nerves II through XII intact with no gross focal sensory or mot take medications as prescribed. Patient is aware that they are to return to ED if any worsening problems. Patient was also instructed to followup with the appropriate physician. Patient verbalizes and agrees with plan.   Patient discharged in good condit

## 2017-05-22 ENCOUNTER — OFFICE VISIT (OUTPATIENT)
Dept: HEMATOLOGY/ONCOLOGY | Facility: HOSPITAL | Age: 72
End: 2017-05-22
Attending: INTERNAL MEDICINE
Payer: MEDICARE

## 2017-05-22 VITALS
HEART RATE: 77 BPM | RESPIRATION RATE: 20 BRPM | DIASTOLIC BLOOD PRESSURE: 78 MMHG | TEMPERATURE: 98 F | SYSTOLIC BLOOD PRESSURE: 122 MMHG

## 2017-05-22 DIAGNOSIS — C77.3 BREAST CANCER METASTASIZED TO AXILLARY LYMPH NODE, UNSPECIFIED LATERALITY (HCC): Primary | ICD-10-CM

## 2017-05-22 DIAGNOSIS — C50.919 BREAST CANCER METASTASIZED TO AXILLARY LYMPH NODE, UNSPECIFIED LATERALITY (HCC): Primary | ICD-10-CM

## 2017-05-22 PROCEDURE — 96372 THER/PROPH/DIAG INJ SC/IM: CPT

## 2017-05-22 PROCEDURE — 85025 COMPLETE CBC W/AUTO DIFF WBC: CPT

## 2017-05-23 ENCOUNTER — APPOINTMENT (OUTPATIENT)
Dept: HEMATOLOGY/ONCOLOGY | Facility: HOSPITAL | Age: 72
End: 2017-05-23
Attending: INTERNAL MEDICINE
Payer: MEDICARE

## 2017-05-25 ENCOUNTER — OFFICE VISIT (OUTPATIENT)
Dept: HEMATOLOGY/ONCOLOGY | Facility: HOSPITAL | Age: 72
End: 2017-05-25
Attending: CLINICAL NURSE SPECIALIST
Payer: MEDICARE

## 2017-05-25 ENCOUNTER — OFFICE VISIT (OUTPATIENT)
Dept: HEMATOLOGY/ONCOLOGY | Facility: HOSPITAL | Age: 72
End: 2017-05-25
Attending: INTERNAL MEDICINE
Payer: MEDICARE

## 2017-05-25 VITALS
SYSTOLIC BLOOD PRESSURE: 101 MMHG | DIASTOLIC BLOOD PRESSURE: 68 MMHG | RESPIRATION RATE: 20 BRPM | HEART RATE: 75 BPM | TEMPERATURE: 99 F

## 2017-05-25 DIAGNOSIS — R10.30 LOWER ABDOMINAL PAIN: Primary | ICD-10-CM

## 2017-05-25 DIAGNOSIS — C50.919 BREAST CANCER METASTASIZED TO AXILLARY LYMPH NODE, UNSPECIFIED LATERALITY (HCC): ICD-10-CM

## 2017-05-25 DIAGNOSIS — R19.7 DIARRHEA WITH DEHYDRATION: ICD-10-CM

## 2017-05-25 DIAGNOSIS — C77.3 BREAST CANCER METASTASIZED TO AXILLARY LYMPH NODE, UNSPECIFIED LATERALITY (HCC): ICD-10-CM

## 2017-05-25 DIAGNOSIS — C50.911 BREAST CANCER METASTASIZED TO AXILLARY LYMPH NODE, RIGHT (HCC): ICD-10-CM

## 2017-05-25 DIAGNOSIS — C77.3 BREAST CANCER METASTASIZED TO AXILLARY LYMPH NODE, RIGHT (HCC): ICD-10-CM

## 2017-05-25 DIAGNOSIS — E87.6 HYPOKALEMIA: ICD-10-CM

## 2017-05-25 DIAGNOSIS — C50.911 BREAST CANCER METASTASIZED TO AXILLARY LYMPH NODE, RIGHT (HCC): Primary | ICD-10-CM

## 2017-05-25 DIAGNOSIS — R19.7 DIARRHEA, UNSPECIFIED TYPE: ICD-10-CM

## 2017-05-25 DIAGNOSIS — C77.3 BREAST CANCER METASTASIZED TO AXILLARY LYMPH NODE, RIGHT (HCC): Primary | ICD-10-CM

## 2017-05-25 DIAGNOSIS — T45.1X5D ADVERSE EFFECT OF CHEMOTHERAPY, SUBSEQUENT ENCOUNTER: ICD-10-CM

## 2017-05-25 DIAGNOSIS — R42 LIGHTHEADEDNESS: ICD-10-CM

## 2017-05-25 DIAGNOSIS — R10.9 ABDOMINAL CRAMPING: ICD-10-CM

## 2017-05-25 DIAGNOSIS — R10.9 ABDOMINAL PAIN: ICD-10-CM

## 2017-05-25 PROBLEM — T45.1X5A ADVERSE EFFECT OF CHEMOTHERAPY: Status: ACTIVE | Noted: 2017-05-25

## 2017-05-25 PROCEDURE — 96366 THER/PROPH/DIAG IV INF ADDON: CPT

## 2017-05-25 PROCEDURE — 99214 OFFICE O/P EST MOD 30 MIN: CPT | Performed by: CLINICAL NURSE SPECIALIST

## 2017-05-25 PROCEDURE — 96365 THER/PROPH/DIAG IV INF INIT: CPT

## 2017-05-25 PROCEDURE — 80048 BASIC METABOLIC PNL TOTAL CA: CPT

## 2017-05-25 RX ORDER — DICYCLOMINE HCL 20 MG
20 TABLET ORAL 4 TIMES DAILY PRN
Qty: 90 TABLET | Refills: 3 | Status: SHIPPED | OUTPATIENT
Start: 2017-05-25 | End: 2017-08-29

## 2017-05-25 NOTE — PROGRESS NOTES
[facility] Progress Note    Patient Name: Barbara Cortés   YOB: 1945   Medical Record Number: [id number]   CSN: [id number]   Date of visit: 5/25/2017   Provider: JORGE Alford  Referring Physician: No ref.  provider found    Probl each, Rfl: 0  •  Albuterol Sulfate HFA (PROAIR HFA) 108 (90 Base) MCG/ACT Inhalation Aero Soln, Inhale into the lungs every 6 (six) hours as needed for Wheezing., Disp: , Rfl:   •  Budesonide-Formoterol Fumarate (SYMBICORT IN), Inhale into the lungs. , Disp rate and rhythm  Abdomen:  Non-distended, normoactive bowel sounds, soft,nontender, no hepatosplenomegaly.   Extremities:  No edema, no tenderness  Neuro:  CN 2-12 intact    Labs:        Recent Results (from the past 24 hour(s))  -BASIC METABOLIC PANEL (8)

## 2017-05-25 NOTE — PROGRESS NOTES
Patient states she's been having abdominal cramping since yesterday. BM soft yesterday, today loose. BP low since Monday. Christy Seth, GARY, assessed patient. Orders received. Patient unable to give stool sample - will attempt to give one tomorrow.     Educ

## 2017-05-26 ENCOUNTER — APPOINTMENT (OUTPATIENT)
Dept: LAB | Facility: HOSPITAL | Age: 72
End: 2017-05-26
Attending: CLINICAL NURSE SPECIALIST
Payer: MEDICARE

## 2017-05-26 ENCOUNTER — OFFICE VISIT (OUTPATIENT)
Dept: HEMATOLOGY/ONCOLOGY | Facility: HOSPITAL | Age: 72
End: 2017-05-26
Attending: INTERNAL MEDICINE
Payer: MEDICARE

## 2017-05-26 VITALS
RESPIRATION RATE: 20 BRPM | HEART RATE: 79 BPM | SYSTOLIC BLOOD PRESSURE: 115 MMHG | OXYGEN SATURATION: 98 % | DIASTOLIC BLOOD PRESSURE: 77 MMHG | TEMPERATURE: 99 F

## 2017-05-26 DIAGNOSIS — R19.7 DIARRHEA, UNSPECIFIED TYPE: ICD-10-CM

## 2017-05-26 DIAGNOSIS — C50.911 BREAST CANCER METASTASIZED TO AXILLARY LYMPH NODE, RIGHT (HCC): ICD-10-CM

## 2017-05-26 DIAGNOSIS — E83.42 HYPOMAGNESEMIA: ICD-10-CM

## 2017-05-26 DIAGNOSIS — C77.3 BREAST CANCER METASTASIZED TO AXILLARY LYMPH NODE, RIGHT (HCC): ICD-10-CM

## 2017-05-26 DIAGNOSIS — E87.6 HYPOKALEMIA: ICD-10-CM

## 2017-05-26 DIAGNOSIS — R19.7 DIARRHEA WITH DEHYDRATION: Primary | ICD-10-CM

## 2017-05-26 PROCEDURE — 96365 THER/PROPH/DIAG IV INF INIT: CPT

## 2017-05-26 PROCEDURE — 87493 C DIFF AMPLIFIED PROBE: CPT

## 2017-05-26 PROCEDURE — 85027 COMPLETE CBC AUTOMATED: CPT

## 2017-05-26 PROCEDURE — 83735 ASSAY OF MAGNESIUM: CPT

## 2017-05-26 PROCEDURE — 96360 HYDRATION IV INFUSION INIT: CPT

## 2017-05-26 PROCEDURE — 85007 BL SMEAR W/DIFF WBC COUNT: CPT

## 2017-05-26 PROCEDURE — 96361 HYDRATE IV INFUSION ADD-ON: CPT

## 2017-05-26 PROCEDURE — 80048 BASIC METABOLIC PNL TOTAL CA: CPT

## 2017-05-26 PROCEDURE — 85025 COMPLETE CBC W/AUTO DIFF WBC: CPT

## 2017-05-26 NOTE — PROGRESS NOTES
Education Record    Learner:  Patient and Family Member    Disease / Diagnosis: Diarrhea / Dehydration/ Abdominal Cramping    Barriers / Limitations:  None   Comments:    Method:  Discussion and Printed material   Comments:    General Topics:  Medication,

## 2017-05-26 NOTE — PATIENT INSTRUCTIONS
Oncology: Controlling Diarrhea  Diarrhea (loose stools) is a common side effect of chemotherapy and radiation therapy. Diarrhea results when treatment affects the normal cells lining the intestine.  To help limit this problem, try the tips on this handout

## 2017-05-31 ENCOUNTER — SNF/IP PROF CHARGE ONLY (OUTPATIENT)
Dept: HEMATOLOGY/ONCOLOGY | Facility: HOSPITAL | Age: 72
End: 2017-05-31

## 2017-05-31 DIAGNOSIS — C77.3 BREAST CANCER METASTASIZED TO AXILLARY LYMPH NODE, RIGHT (HCC): Primary | ICD-10-CM

## 2017-05-31 DIAGNOSIS — C50.911 BREAST CANCER METASTASIZED TO AXILLARY LYMPH NODE, RIGHT (HCC): Primary | ICD-10-CM

## 2017-05-31 PROCEDURE — G9678 ONCOLOGY CARE MODEL SERVICE: HCPCS | Performed by: INTERNAL MEDICINE

## 2017-06-05 ENCOUNTER — OFFICE VISIT (OUTPATIENT)
Dept: HEMATOLOGY/ONCOLOGY | Facility: HOSPITAL | Age: 72
End: 2017-06-05
Attending: INTERNAL MEDICINE
Payer: MEDICARE

## 2017-06-05 VITALS
SYSTOLIC BLOOD PRESSURE: 170 MMHG | BODY MASS INDEX: 22.74 KG/M2 | WEIGHT: 117.38 LBS | OXYGEN SATURATION: 98 % | RESPIRATION RATE: 20 BRPM | HEIGHT: 60.24 IN | DIASTOLIC BLOOD PRESSURE: 96 MMHG | TEMPERATURE: 97 F | HEART RATE: 88 BPM

## 2017-06-05 DIAGNOSIS — C77.3 BREAST CANCER METASTASIZED TO AXILLARY LYMPH NODE, RIGHT (HCC): Primary | ICD-10-CM

## 2017-06-05 DIAGNOSIS — C50.911 BREAST CANCER METASTASIZED TO AXILLARY LYMPH NODE, RIGHT (HCC): Primary | ICD-10-CM

## 2017-06-05 PROCEDURE — 99214 OFFICE O/P EST MOD 30 MIN: CPT | Performed by: INTERNAL MEDICINE

## 2017-06-05 PROCEDURE — 96375 TX/PRO/DX INJ NEW DRUG ADDON: CPT

## 2017-06-05 PROCEDURE — 96413 CHEMO IV INFUSION 1 HR: CPT

## 2017-06-05 PROCEDURE — 80053 COMPREHEN METABOLIC PANEL: CPT

## 2017-06-05 PROCEDURE — 96417 CHEMO IV INFUS EACH ADDL SEQ: CPT

## 2017-06-05 PROCEDURE — 96377 APPLICATON ON-BODY INJECTOR: CPT

## 2017-06-05 PROCEDURE — 85025 COMPLETE CBC W/AUTO DIFF WBC: CPT

## 2017-06-05 RX ORDER — SODIUM CHLORIDE 0.9 % (FLUSH) 0.9 %
10 SYRINGE (ML) INJECTION ONCE
Status: COMPLETED | OUTPATIENT
Start: 2017-06-05 | End: 2017-06-05

## 2017-06-05 RX ADMIN — SODIUM CHLORIDE 0.9 % (FLUSH) 10 ML: 0.9 % SYRINGE (ML) INJECTION at 14:40:00

## 2017-06-05 NOTE — PROGRESS NOTES
Cancer Center Progress Note    Problem List:      Patient Active Problem List:     De Quervain's syndrome (tenosynovitis)     Osteopenia     Breast cancer metastasized to axillary lymph node ([facility] [address]     Other specified counseling     Dyspnea     COPD (chroni (90 Base) MCG/ACT Inhalation Aero Soln Inhale into the lungs every 6 (six) hours as needed for Wheezing. Disp:  Rfl:    Budesonide-Formoterol Fumarate (SYMBICORT IN) Inhale into the lungs.  Disp:  Rfl:    sucralfate 1 GM/10ML Oral Suspension Take 1 g by jarod 06/05/2017   HCT 28.1* 06/05/2017   MCV 94.0 06/05/2017   MCH 30.8 06/05/2017   MCHC 32.7 06/05/2017   RDW 17.2* 06/05/2017   .0 06/05/2017       Lab Results  Component Value Date    05/26/2017   K 3.7 05/26/2017    05/26/2017   CO2 26. 0

## 2017-06-05 NOTE — PROGRESS NOTES
Education Record    Learner:  Patient & family    Disease / Diagnosis: breast w. lymph    Barriers / Limitations:  None   Comments:    Method:  Discussion   Comments:    General Topics:  Medication, Side effects and symptom management and Plan of care revi

## 2017-06-05 NOTE — PROGRESS NOTES
Pt here for follow up and treatment. See toxicities. Pt states she has been holding her blood pressure medication as instructed. She states her blood pressure was high this morning so she did take her lisinopril. She complains of heartburn.  She states

## 2017-06-08 ENCOUNTER — OFFICE VISIT (OUTPATIENT)
Dept: HEMATOLOGY/ONCOLOGY | Facility: HOSPITAL | Age: 72
End: 2017-06-08
Attending: INTERNAL MEDICINE
Payer: MEDICARE

## 2017-06-08 VITALS
DIASTOLIC BLOOD PRESSURE: 77 MMHG | RESPIRATION RATE: 18 BRPM | OXYGEN SATURATION: 96 % | HEART RATE: 89 BPM | BODY MASS INDEX: 22 KG/M2 | SYSTOLIC BLOOD PRESSURE: 121 MMHG | WEIGHT: 116 LBS | TEMPERATURE: 98 F

## 2017-06-08 DIAGNOSIS — R10.9 ABDOMINAL CRAMPING: ICD-10-CM

## 2017-06-08 DIAGNOSIS — C50.911 BREAST CANCER METASTASIZED TO AXILLARY LYMPH NODE, RIGHT (HCC): Primary | ICD-10-CM

## 2017-06-08 DIAGNOSIS — T45.1X5D ADVERSE EFFECT OF CHEMOTHERAPY, SUBSEQUENT ENCOUNTER: ICD-10-CM

## 2017-06-08 DIAGNOSIS — R53.83 OTHER FATIGUE: ICD-10-CM

## 2017-06-08 DIAGNOSIS — R19.7 DIARRHEA WITH DEHYDRATION: Primary | ICD-10-CM

## 2017-06-08 DIAGNOSIS — K59.09 OTHER CONSTIPATION: ICD-10-CM

## 2017-06-08 DIAGNOSIS — C77.3 BREAST CANCER METASTASIZED TO AXILLARY LYMPH NODE, RIGHT (HCC): ICD-10-CM

## 2017-06-08 DIAGNOSIS — E86.0 RECURRENT DEHYDRATION: ICD-10-CM

## 2017-06-08 DIAGNOSIS — C50.911 BREAST CANCER METASTASIZED TO AXILLARY LYMPH NODE, RIGHT (HCC): ICD-10-CM

## 2017-06-08 DIAGNOSIS — C77.3 BREAST CANCER METASTASIZED TO AXILLARY LYMPH NODE, RIGHT (HCC): Primary | ICD-10-CM

## 2017-06-08 PROCEDURE — 99213 OFFICE O/P EST LOW 20 MIN: CPT | Performed by: CLINICAL NURSE SPECIALIST

## 2017-06-08 PROCEDURE — 96360 HYDRATION IV INFUSION INIT: CPT

## 2017-06-08 PROCEDURE — 96361 HYDRATE IV INFUSION ADD-ON: CPT

## 2017-06-08 RX ORDER — SODIUM CHLORIDE 0.9 % (FLUSH) 0.9 %
10 SYRINGE (ML) INJECTION ONCE
Status: COMPLETED | OUTPATIENT
Start: 2017-06-08 | End: 2017-06-08

## 2017-06-08 RX ADMIN — SODIUM CHLORIDE 0.9 % (FLUSH) 10 ML: 0.9 % SYRINGE (ML) INJECTION at 12:25:00

## 2017-06-08 NOTE — PROGRESS NOTES
[facility] Progress Note    Patient Name: Lisseth Nunes   YOB: 1945   Medical Record Number: [id number]   CSN: [id number]   Date of visit: 6/8/2017   Provider: JORGE Decker  Referring Physician: No ref.  provider found    Proble 5       Medications:    Current outpatient prescriptions:   •  lisinopril 10 MG Oral Tab, 20 mg daily, Disp: , Rfl: 1  •  Spacer/Aero Chamber Mouthpiece Does not apply Misc, Patient needs spacer to use inhaler for COPD, Disp: 1 each, Rfl: 0  •  Albuterol S lesion/thrush, mucous membranes are moist.  Neck:  Supple, no tenderness, no masses or adenopathy  Lungs:  Clear to auscultation bilaterally  CV:  Regular rate and rhythm  Abdomen:  Non-distended, normoactive bowel sounds, soft,nontender, no hepatosplenome

## 2017-06-08 NOTE — PROGRESS NOTES
Pt arrived for IVF and for APN f/u with Nadiya Corrales, pt returning tomorrow and MOnday for additional fluids and refused to leave port accessed, pt states having increased fatigue and severe constipation but refusing to take medication do to diarrhea, pt alert and

## 2017-06-09 ENCOUNTER — OFFICE VISIT (OUTPATIENT)
Dept: HEMATOLOGY/ONCOLOGY | Facility: HOSPITAL | Age: 72
End: 2017-06-09
Attending: INTERNAL MEDICINE
Payer: MEDICARE

## 2017-06-09 VITALS
RESPIRATION RATE: 18 BRPM | HEART RATE: 63 BPM | DIASTOLIC BLOOD PRESSURE: 78 MMHG | SYSTOLIC BLOOD PRESSURE: 112 MMHG | TEMPERATURE: 99 F

## 2017-06-09 DIAGNOSIS — R19.7 DIARRHEA WITH DEHYDRATION: ICD-10-CM

## 2017-06-09 DIAGNOSIS — C77.3 BREAST CANCER METASTASIZED TO AXILLARY LYMPH NODE, RIGHT (HCC): Primary | ICD-10-CM

## 2017-06-09 DIAGNOSIS — C50.911 BREAST CANCER METASTASIZED TO AXILLARY LYMPH NODE, RIGHT (HCC): Primary | ICD-10-CM

## 2017-06-09 PROCEDURE — 96361 HYDRATE IV INFUSION ADD-ON: CPT

## 2017-06-09 PROCEDURE — 96360 HYDRATION IV INFUSION INIT: CPT

## 2017-06-09 NOTE — PROGRESS NOTES
Education Record    Learner:  Patient    Disease / Fredda Poli ca/dehydration    Barriers / Limitations:  None   Comments:    Method:  Discussion   Comments:    General Topics:  Procedure, Plan of care reviewed and Fall risk and prevention   Comments:

## 2017-06-12 ENCOUNTER — OFFICE VISIT (OUTPATIENT)
Dept: HEMATOLOGY/ONCOLOGY | Facility: HOSPITAL | Age: 72
End: 2017-06-12
Attending: INTERNAL MEDICINE
Payer: MEDICARE

## 2017-06-12 VITALS
HEART RATE: 59 BPM | OXYGEN SATURATION: 99 % | WEIGHT: 113 LBS | TEMPERATURE: 98 F | BODY MASS INDEX: 22 KG/M2 | SYSTOLIC BLOOD PRESSURE: 127 MMHG | RESPIRATION RATE: 20 BRPM | DIASTOLIC BLOOD PRESSURE: 79 MMHG

## 2017-06-12 DIAGNOSIS — C77.3 BREAST CANCER METASTASIZED TO AXILLARY LYMPH NODE, RIGHT (HCC): Primary | ICD-10-CM

## 2017-06-12 DIAGNOSIS — C50.911 BREAST CANCER METASTASIZED TO AXILLARY LYMPH NODE, RIGHT (HCC): Primary | ICD-10-CM

## 2017-06-12 DIAGNOSIS — R19.7 DIARRHEA: ICD-10-CM

## 2017-06-12 PROCEDURE — 85025 COMPLETE CBC W/AUTO DIFF WBC: CPT

## 2017-06-12 PROCEDURE — 96361 HYDRATE IV INFUSION ADD-ON: CPT

## 2017-06-12 PROCEDURE — 96360 HYDRATION IV INFUSION INIT: CPT

## 2017-06-12 RX ORDER — LOPERAMIDE HYDROCHLORIDE 2 MG/1
4 CAPSULE ORAL ONCE
Status: COMPLETED | OUTPATIENT
Start: 2017-06-12 | End: 2017-06-12

## 2017-06-12 RX ORDER — SODIUM CHLORIDE 0.9 % (FLUSH) 0.9 %
10 SYRINGE (ML) INJECTION ONCE
Status: COMPLETED | OUTPATIENT
Start: 2017-06-12 | End: 2017-06-12

## 2017-06-12 RX ADMIN — LOPERAMIDE HYDROCHLORIDE 4 MG: 2 CAPSULE ORAL at 12:53:00

## 2017-06-12 RX ADMIN — SODIUM CHLORIDE 0.9 % (FLUSH) 10 ML: 0.9 % SYRINGE (ML) INJECTION at 12:44:00

## 2017-06-12 NOTE — PROGRESS NOTES
Asked by RN to assess rash    Patient has metastatic breast cancer and is here today for chemo    She received Carbo/Taxotere, Herceptin and neulasta 6/5    She reported a rash to the RN    It is present on bilateral forearms, posterior.     Left forearm ha

## 2017-06-12 NOTE — PROGRESS NOTES
Pt here for IVF today. States she has been able to tolerable a bland diet and has been keeping down small amounts of water and ginger ale. Pt reports that every time she eats or drinks she experiences nausea pain to her middle abdomen.  Denies diarrhea or v

## 2017-06-12 NOTE — PROGRESS NOTES
Upon completion of IVF- pt c/o having stomach cramping and approx 3 episodes of diarrhea within the last 30 minutes. Pt states she just ate some soup and a turkey sandwich and it is not unusual for her to have diarrhea after eating.  Pt has lomotil and Bent

## 2017-06-16 ENCOUNTER — OFFICE VISIT (OUTPATIENT)
Dept: HEMATOLOGY/ONCOLOGY | Facility: HOSPITAL | Age: 72
End: 2017-06-16
Attending: INTERNAL MEDICINE
Payer: MEDICARE

## 2017-06-16 VITALS
TEMPERATURE: 99 F | DIASTOLIC BLOOD PRESSURE: 70 MMHG | HEART RATE: 52 BPM | SYSTOLIC BLOOD PRESSURE: 138 MMHG | RESPIRATION RATE: 18 BRPM

## 2017-06-16 DIAGNOSIS — C50.911 BREAST CANCER METASTASIZED TO AXILLARY LYMPH NODE, RIGHT (HCC): Primary | ICD-10-CM

## 2017-06-16 DIAGNOSIS — C77.3 BREAST CANCER METASTASIZED TO AXILLARY LYMPH NODE, RIGHT (HCC): Primary | ICD-10-CM

## 2017-06-16 DIAGNOSIS — R19.7 DIARRHEA WITH DEHYDRATION: ICD-10-CM

## 2017-06-16 PROCEDURE — 96360 HYDRATION IV INFUSION INIT: CPT

## 2017-06-16 RX ORDER — SODIUM CHLORIDE 0.9 % (FLUSH) 0.9 %
10 SYRINGE (ML) INJECTION ONCE
Status: COMPLETED | OUTPATIENT
Start: 2017-06-16 | End: 2017-06-16

## 2017-06-16 RX ADMIN — SODIUM CHLORIDE 0.9 % (FLUSH) 10 ML: 0.9 % SYRINGE (ML) INJECTION at 14:35:00

## 2017-06-16 NOTE — PROGRESS NOTES
Education Record    Learner:  Patient    Disease / Diagnosis:dehydration/IVF/breast ca    Barriers / Limitations:  None   Comments:    Method:  Discussion   Comments:    General Topics:  Procedure, Side effects and symptom management, Plan of care reviewed

## 2017-06-23 ENCOUNTER — APPOINTMENT (OUTPATIENT)
Dept: HEMATOLOGY/ONCOLOGY | Facility: HOSPITAL | Age: 72
End: 2017-06-23
Attending: INTERNAL MEDICINE
Payer: MEDICARE

## 2017-06-26 ENCOUNTER — OFFICE VISIT (OUTPATIENT)
Dept: HEMATOLOGY/ONCOLOGY | Facility: HOSPITAL | Age: 72
End: 2017-06-26
Attending: INTERNAL MEDICINE
Payer: MEDICARE

## 2017-06-26 VITALS
TEMPERATURE: 97 F | WEIGHT: 116 LBS | OXYGEN SATURATION: 97 % | SYSTOLIC BLOOD PRESSURE: 140 MMHG | RESPIRATION RATE: 18 BRPM | HEIGHT: 59.92 IN | HEART RATE: 76 BPM | DIASTOLIC BLOOD PRESSURE: 83 MMHG | BODY MASS INDEX: 22.78 KG/M2

## 2017-06-26 DIAGNOSIS — C50.911 BREAST CANCER METASTASIZED TO AXILLARY LYMPH NODE, RIGHT (HCC): Primary | ICD-10-CM

## 2017-06-26 DIAGNOSIS — C77.3 BREAST CANCER METASTASIZED TO AXILLARY LYMPH NODE, RIGHT (HCC): Primary | ICD-10-CM

## 2017-06-26 PROCEDURE — 96377 APPLICATON ON-BODY INJECTOR: CPT

## 2017-06-26 PROCEDURE — 85025 COMPLETE CBC W/AUTO DIFF WBC: CPT

## 2017-06-26 PROCEDURE — 96413 CHEMO IV INFUSION 1 HR: CPT

## 2017-06-26 PROCEDURE — 99215 OFFICE O/P EST HI 40 MIN: CPT | Performed by: INTERNAL MEDICINE

## 2017-06-26 PROCEDURE — 96375 TX/PRO/DX INJ NEW DRUG ADDON: CPT

## 2017-06-26 PROCEDURE — 96417 CHEMO IV INFUS EACH ADDL SEQ: CPT

## 2017-06-26 PROCEDURE — 80053 COMPREHEN METABOLIC PANEL: CPT

## 2017-06-26 RX ORDER — PROCHLORPERAZINE MALEATE 10 MG
10 TABLET ORAL EVERY 6 HOURS PRN
Status: CANCELLED | OUTPATIENT
Start: 2017-06-26

## 2017-06-26 RX ORDER — SODIUM CHLORIDE 0.9 % (FLUSH) 0.9 %
10 SYRINGE (ML) INJECTION ONCE
Status: COMPLETED | OUTPATIENT
Start: 2017-06-26 | End: 2017-06-26

## 2017-06-26 RX ORDER — SODIUM CHLORIDE 0.9 % (FLUSH) 0.9 %
10 SYRINGE (ML) INJECTION ONCE
Status: CANCELLED | OUTPATIENT
Start: 2017-06-26

## 2017-06-26 RX ORDER — ONDANSETRON 2 MG/ML
8 INJECTION INTRAMUSCULAR; INTRAVENOUS EVERY 6 HOURS PRN
Status: CANCELLED | OUTPATIENT
Start: 2017-06-26

## 2017-06-26 RX ORDER — METOCLOPRAMIDE HYDROCHLORIDE 5 MG/ML
10 INJECTION INTRAMUSCULAR; INTRAVENOUS EVERY 6 HOURS PRN
Status: CANCELLED | OUTPATIENT
Start: 2017-06-26

## 2017-06-26 RX ORDER — LORAZEPAM 2 MG/ML
INJECTION INTRAMUSCULAR EVERY 4 HOURS PRN
Status: CANCELLED | OUTPATIENT
Start: 2017-06-26

## 2017-06-26 RX ORDER — LORAZEPAM 0.5 MG/1
TABLET ORAL EVERY 4 HOURS PRN
Status: CANCELLED | OUTPATIENT
Start: 2017-06-26

## 2017-06-26 RX ADMIN — SODIUM CHLORIDE 0.9 % (FLUSH) 10 ML: 0.9 % SYRINGE (ML) INJECTION at 14:10:00

## 2017-06-26 NOTE — PROGRESS NOTES
Cancer Center Progress Note    Problem List:      Patient Active Problem List:     De Quervain's syndrome (tenosynovitis)     Osteopenia     Breast cancer metastasized to axillary lymph node ([facility] [address]     Other specified counseling     Dyspnea     COPD (chroni HFA (PROAIR HFA) 108 (90 Base) MCG/ACT Inhalation Aero Soln Inhale into the lungs every 6 (six) hours as needed for Wheezing. Disp:  Rfl:    Budesonide-Formoterol Fumarate (SYMBICORT IN) Inhale into the lungs.  Disp:  Rfl:    Pantoprazole Sodium 40 MG Oral 06/26/2017       Lab Results  Component Value Date    06/26/2017   K 4.3 06/26/2017    06/26/2017   CO2 25.0 06/26/2017   BUN 15 06/26/2017   CREATSERUM 0.69 06/26/2017    (H) 06/26/2017   CA 9.0 06/26/2017   ALKPHO 98 06/26/2017   ALT 2

## 2017-06-26 NOTE — PROGRESS NOTES
Pt here for follow up and treatment. See toxicities. Pt states she stopped taking compazine because it gives her nightmares and makes her confused. She takes Zofran for nausea. She alternates between diarrhea and constipation.

## 2017-06-29 ENCOUNTER — OFFICE VISIT (OUTPATIENT)
Dept: HEMATOLOGY/ONCOLOGY | Facility: HOSPITAL | Age: 72
End: 2017-06-29
Attending: INTERNAL MEDICINE
Payer: MEDICARE

## 2017-06-29 VITALS
RESPIRATION RATE: 18 BRPM | HEART RATE: 79 BPM | TEMPERATURE: 98 F | BODY MASS INDEX: 22.54 KG/M2 | HEIGHT: 59.92 IN | SYSTOLIC BLOOD PRESSURE: 110 MMHG | WEIGHT: 114.81 LBS | DIASTOLIC BLOOD PRESSURE: 57 MMHG

## 2017-06-29 DIAGNOSIS — C77.3 BREAST CANCER METASTASIZED TO AXILLARY LYMPH NODE, RIGHT (HCC): Primary | ICD-10-CM

## 2017-06-29 DIAGNOSIS — C50.911 BREAST CANCER METASTASIZED TO AXILLARY LYMPH NODE, RIGHT (HCC): Primary | ICD-10-CM

## 2017-06-29 DIAGNOSIS — R19.7 DIARRHEA WITH DEHYDRATION: ICD-10-CM

## 2017-06-29 PROCEDURE — 96375 TX/PRO/DX INJ NEW DRUG ADDON: CPT

## 2017-06-29 PROCEDURE — 96366 THER/PROPH/DIAG IV INF ADDON: CPT

## 2017-06-29 PROCEDURE — 96365 THER/PROPH/DIAG IV INF INIT: CPT

## 2017-06-29 RX ORDER — SODIUM CHLORIDE 0.9 % (FLUSH) 0.9 %
10 SYRINGE (ML) INJECTION ONCE
Status: COMPLETED | OUTPATIENT
Start: 2017-06-29 | End: 2017-06-29

## 2017-06-29 RX ORDER — SODIUM CHLORIDE 0.9 % (FLUSH) 0.9 %
10 SYRINGE (ML) INJECTION ONCE
Status: CANCELLED | OUTPATIENT
Start: 2017-06-29

## 2017-06-29 RX ORDER — METOCLOPRAMIDE 10 MG/1
10 TABLET ORAL 3 TIMES DAILY
Qty: 90 TABLET | Refills: 1 | Status: SHIPPED | OUTPATIENT
Start: 2017-06-29 | End: 2017-08-07

## 2017-06-29 RX ORDER — SODIUM CHLORIDE 9 MG/ML
INJECTION, SOLUTION INTRAVENOUS ONCE
Status: CANCELLED
Start: 2017-06-29 | End: 2017-06-29

## 2017-06-29 RX ADMIN — SODIUM CHLORIDE 0.9 % (FLUSH) 10 ML: 0.9 % SYRINGE (ML) INJECTION at 11:35:00

## 2017-06-30 ENCOUNTER — OFFICE VISIT (OUTPATIENT)
Dept: HEMATOLOGY/ONCOLOGY | Facility: HOSPITAL | Age: 72
End: 2017-06-30
Attending: INTERNAL MEDICINE
Payer: MEDICARE

## 2017-06-30 ENCOUNTER — SNF/IP PROF CHARGE ONLY (OUTPATIENT)
Dept: HEMATOLOGY/ONCOLOGY | Facility: HOSPITAL | Age: 72
End: 2017-06-30

## 2017-06-30 ENCOUNTER — PRIOR ORIGINAL RECORDS (OUTPATIENT)
Dept: OTHER | Age: 72
End: 2017-06-30

## 2017-06-30 VITALS
WEIGHT: 114.63 LBS | DIASTOLIC BLOOD PRESSURE: 73 MMHG | RESPIRATION RATE: 18 BRPM | TEMPERATURE: 98 F | BODY MASS INDEX: 22.51 KG/M2 | HEART RATE: 94 BPM | HEIGHT: 59.92 IN | SYSTOLIC BLOOD PRESSURE: 118 MMHG | OXYGEN SATURATION: 99 %

## 2017-06-30 DIAGNOSIS — C77.3 BREAST CANCER METASTASIZED TO AXILLARY LYMPH NODE, RIGHT (HCC): ICD-10-CM

## 2017-06-30 DIAGNOSIS — C50.911 BREAST CANCER METASTASIZED TO AXILLARY LYMPH NODE, RIGHT (HCC): ICD-10-CM

## 2017-06-30 DIAGNOSIS — R19.7 DIARRHEA WITH DEHYDRATION: Primary | ICD-10-CM

## 2017-06-30 PROCEDURE — 96361 HYDRATE IV INFUSION ADD-ON: CPT

## 2017-06-30 PROCEDURE — G9678 ONCOLOGY CARE MODEL SERVICE: HCPCS | Performed by: INTERNAL MEDICINE

## 2017-06-30 PROCEDURE — 80048 BASIC METABOLIC PNL TOTAL CA: CPT

## 2017-06-30 PROCEDURE — 96360 HYDRATION IV INFUSION INIT: CPT

## 2017-06-30 RX ORDER — SODIUM CHLORIDE 0.9 % (FLUSH) 0.9 %
10 SYRINGE (ML) INJECTION ONCE
Status: CANCELLED | OUTPATIENT
Start: 2017-06-30

## 2017-06-30 RX ORDER — LISINOPRIL 20 MG/1
10 TABLET ORAL
COMMUNITY
End: 2017-08-07

## 2017-06-30 RX ORDER — SODIUM CHLORIDE 9 MG/ML
INJECTION, SOLUTION INTRAVENOUS ONCE
Status: COMPLETED | OUTPATIENT
Start: 2017-06-30 | End: 2017-06-30

## 2017-06-30 RX ORDER — SODIUM CHLORIDE 9 MG/ML
INJECTION, SOLUTION INTRAVENOUS ONCE
Status: CANCELLED
Start: 2017-06-30 | End: 2017-06-30

## 2017-06-30 RX ORDER — SODIUM CHLORIDE 0.9 % (FLUSH) 0.9 %
10 SYRINGE (ML) INJECTION ONCE
Status: COMPLETED | OUTPATIENT
Start: 2017-06-30 | End: 2017-06-30

## 2017-06-30 RX ADMIN — SODIUM CHLORIDE: 9 INJECTION, SOLUTION INTRAVENOUS at 08:55:00

## 2017-06-30 RX ADMIN — SODIUM CHLORIDE 0.9 % (FLUSH) 10 ML: 0.9 % SYRINGE (ML) INJECTION at 11:00:00

## 2017-06-30 NOTE — PROGRESS NOTES
Education Record    Learner:  Patient    Disease / Diagnosis:breast cancer    Barriers / Limitations:  None   Comments:    Method:  Brief focused   Comments:    General Topics:  Medication   Comments:  Per order from Dr. Roxine Cushing, pt received 1 liter of NS ove

## 2017-07-01 DIAGNOSIS — C77.3 BREAST CANCER METASTASIZED TO AXILLARY LYMPH NODE, RIGHT (HCC): ICD-10-CM

## 2017-07-01 DIAGNOSIS — C50.911 BREAST CANCER METASTASIZED TO AXILLARY LYMPH NODE, RIGHT (HCC): ICD-10-CM

## 2017-07-05 ENCOUNTER — OFFICE VISIT (OUTPATIENT)
Dept: HEMATOLOGY/ONCOLOGY | Facility: HOSPITAL | Age: 72
End: 2017-07-05
Attending: INTERNAL MEDICINE
Payer: MEDICARE

## 2017-07-05 ENCOUNTER — HOSPITAL ENCOUNTER (OUTPATIENT)
Dept: CV DIAGNOSTICS | Facility: HOSPITAL | Age: 72
Discharge: HOME | End: 2017-07-05
Attending: INTERNAL MEDICINE

## 2017-07-05 ENCOUNTER — PRIOR ORIGINAL RECORDS (OUTPATIENT)
Dept: OTHER | Age: 72
End: 2017-07-05

## 2017-07-05 DIAGNOSIS — C77.3 BREAST CANCER METASTASIZED TO AXILLARY LYMPH NODE, RIGHT (HCC): ICD-10-CM

## 2017-07-05 DIAGNOSIS — I10 HYPERTENSION, ESSENTIAL: ICD-10-CM

## 2017-07-05 DIAGNOSIS — C50.911 BREAST CANCER METASTASIZED TO AXILLARY LYMPH NODE, RIGHT (HCC): ICD-10-CM

## 2017-07-05 DIAGNOSIS — Z08 ENCOUNTER FOR ROUTINE CANCER FOLLOW-UP: ICD-10-CM

## 2017-07-05 DIAGNOSIS — R19.7 DIARRHEA WITH DEHYDRATION: Primary | ICD-10-CM

## 2017-07-05 LAB
BUN BLD-MCNC: 5 MG/DL (ref 8–20)
CALCIUM BLD-MCNC: 8.2 MG/DL (ref 8.3–10.3)
CHLORIDE: 101 MMOL/L (ref 101–111)
CO2: 27 MMOL/L (ref 22–32)
CREAT BLD-MCNC: 0.7 MG/DL (ref 0.55–1.02)
GLUCOSE BLD-MCNC: 102 MG/DL (ref 70–99)
HAV IGM SER QL: 1.5 MG/DL (ref 1.7–3)
POTASSIUM SERPL-SCNC: 3.3 MMOL/L (ref 3.6–5.1)
SODIUM SERPL-SCNC: 136 MMOL/L (ref 136–144)

## 2017-07-05 PROCEDURE — 80048 BASIC METABOLIC PNL TOTAL CA: CPT

## 2017-07-05 PROCEDURE — 96368 THER/DIAG CONCURRENT INF: CPT

## 2017-07-05 PROCEDURE — 96366 THER/PROPH/DIAG IV INF ADDON: CPT

## 2017-07-05 PROCEDURE — 96365 THER/PROPH/DIAG IV INF INIT: CPT

## 2017-07-05 PROCEDURE — 83735 ASSAY OF MAGNESIUM: CPT

## 2017-07-05 RX ORDER — DIPHENOXYLATE HYDROCHLORIDE AND ATROPINE SULFATE 2.5; .025 MG/1; MG/1
1-2 TABLET ORAL 4 TIMES DAILY PRN
Qty: 30 TABLET | Refills: 0 | Status: SHIPPED | OUTPATIENT
Start: 2017-07-05 | End: 2017-08-29

## 2017-07-05 RX ORDER — SODIUM CHLORIDE 0.9 % (FLUSH) 0.9 %
10 SYRINGE (ML) INJECTION ONCE
Status: CANCELLED | OUTPATIENT
Start: 2017-07-05

## 2017-07-05 RX ORDER — ONDANSETRON HYDROCHLORIDE 8 MG/1
TABLET, FILM COATED ORAL
Qty: 30 TABLET | Refills: 0 | Status: SHIPPED | OUTPATIENT
Start: 2017-07-05 | End: 2017-08-07

## 2017-07-05 RX ORDER — POTASSIUM CHLORIDE 20 MEQ/1
40 TABLET, EXTENDED RELEASE ORAL ONCE
Status: COMPLETED | OUTPATIENT
Start: 2017-07-05 | End: 2017-07-05

## 2017-07-05 RX ORDER — SODIUM CHLORIDE 9 MG/ML
INJECTION, SOLUTION INTRAVENOUS ONCE
Status: CANCELLED
Start: 2017-07-05 | End: 2017-07-05

## 2017-07-05 RX ADMIN — POTASSIUM CHLORIDE 40 MEQ: 20 TABLET, EXTENDED RELEASE ORAL at 12:50:00

## 2017-07-05 NOTE — PATIENT INSTRUCTIONS
For Dr. Liu Tolliver nurse line, call  [phone] with any questions or concerns Monday through Friday 8:00 to 4:30.   After hours or weekends for emergent needs [phone]

## 2017-07-05 NOTE — PROGRESS NOTES
Call to [facility] APN to discuss lab results, per [facility], orders rec'd to get 40mEq of PO K+ now and 2grams of Mag IVPB now, have pt return to [facility] on 7/7 for cll and possible IVF/K+ or Mg. Pt and daughter are agreeable to this plan.       NACL completed, Mg IVP

## 2017-07-07 ENCOUNTER — OFFICE VISIT (OUTPATIENT)
Dept: HEMATOLOGY/ONCOLOGY | Facility: HOSPITAL | Age: 72
End: 2017-07-07
Attending: INTERNAL MEDICINE
Payer: MEDICARE

## 2017-07-07 ENCOUNTER — PRIOR ORIGINAL RECORDS (OUTPATIENT)
Dept: OTHER | Age: 72
End: 2017-07-07

## 2017-07-07 VITALS
SYSTOLIC BLOOD PRESSURE: 143 MMHG | HEIGHT: 59.92 IN | RESPIRATION RATE: 18 BRPM | DIASTOLIC BLOOD PRESSURE: 88 MMHG | WEIGHT: 114 LBS | TEMPERATURE: 97 F | BODY MASS INDEX: 22.38 KG/M2 | HEART RATE: 78 BPM

## 2017-07-07 DIAGNOSIS — C50.911 BREAST CANCER METASTASIZED TO AXILLARY LYMPH NODE, RIGHT (HCC): ICD-10-CM

## 2017-07-07 DIAGNOSIS — C77.3 BREAST CANCER METASTASIZED TO AXILLARY LYMPH NODE, RIGHT (HCC): ICD-10-CM

## 2017-07-07 DIAGNOSIS — R19.7 DIARRHEA WITH DEHYDRATION: Primary | ICD-10-CM

## 2017-07-07 LAB
BUN BLD-MCNC: 4 MG/DL (ref 8–20)
CALCIUM BLD-MCNC: 8.5 MG/DL (ref 8.3–10.3)
CHLORIDE: 105 MMOL/L (ref 101–111)
CO2: 27 MMOL/L (ref 22–32)
CREAT BLD-MCNC: 0.69 MG/DL (ref 0.55–1.02)
GLUCOSE BLD-MCNC: 87 MG/DL (ref 70–99)
HAV IGM SER QL: 1.6 MG/DL (ref 1.7–3)
POTASSIUM SERPL-SCNC: 4 MMOL/L (ref 3.6–5.1)
SODIUM SERPL-SCNC: 139 MMOL/L (ref 136–144)

## 2017-07-07 PROCEDURE — 83735 ASSAY OF MAGNESIUM: CPT

## 2017-07-07 PROCEDURE — 96365 THER/PROPH/DIAG IV INF INIT: CPT

## 2017-07-07 PROCEDURE — 80048 BASIC METABOLIC PNL TOTAL CA: CPT

## 2017-07-07 RX ORDER — SODIUM CHLORIDE 9 MG/ML
INJECTION, SOLUTION INTRAVENOUS ONCE
Status: CANCELLED
Start: 2017-07-07 | End: 2017-07-07

## 2017-07-07 RX ORDER — SODIUM CHLORIDE 0.9 % (FLUSH) 0.9 %
10 SYRINGE (ML) INJECTION ONCE
Status: COMPLETED | OUTPATIENT
Start: 2017-07-07 | End: 2017-07-07

## 2017-07-07 RX ORDER — SODIUM CHLORIDE 0.9 % (FLUSH) 0.9 %
10 SYRINGE (ML) INJECTION ONCE
Status: CANCELLED | OUTPATIENT
Start: 2017-07-07

## 2017-07-07 RX ADMIN — SODIUM CHLORIDE 0.9 % (FLUSH) 10 ML: 0.9 % SYRINGE (ML) INJECTION at 13:30:00

## 2017-07-14 ENCOUNTER — PRIOR ORIGINAL RECORDS (OUTPATIENT)
Dept: OTHER | Age: 72
End: 2017-07-14

## 2017-07-17 ENCOUNTER — OFFICE VISIT (OUTPATIENT)
Dept: HEMATOLOGY/ONCOLOGY | Facility: HOSPITAL | Age: 72
End: 2017-07-17
Attending: INTERNAL MEDICINE
Payer: MEDICARE

## 2017-07-17 VITALS
BODY MASS INDEX: 22.9 KG/M2 | WEIGHT: 116.63 LBS | DIASTOLIC BLOOD PRESSURE: 76 MMHG | RESPIRATION RATE: 18 BRPM | OXYGEN SATURATION: 97 % | TEMPERATURE: 98 F | SYSTOLIC BLOOD PRESSURE: 136 MMHG | HEIGHT: 59.96 IN | HEART RATE: 71 BPM

## 2017-07-17 DIAGNOSIS — C77.3 BREAST CANCER METASTASIZED TO AXILLARY LYMPH NODE, RIGHT (HCC): Primary | ICD-10-CM

## 2017-07-17 DIAGNOSIS — C50.911 BREAST CANCER METASTASIZED TO AXILLARY LYMPH NODE, RIGHT (HCC): Primary | ICD-10-CM

## 2017-07-17 DIAGNOSIS — J44.9 CHRONIC OBSTRUCTIVE PULMONARY DISEASE, UNSPECIFIED COPD TYPE (HCC): ICD-10-CM

## 2017-07-17 LAB
ALBUMIN SERPL-MCNC: 3.3 G/DL (ref 3.5–4.8)
ALP LIVER SERPL-CCNC: 90 U/L (ref 55–142)
ALT SERPL-CCNC: 17 U/L (ref 14–54)
AST SERPL-CCNC: 12 U/L (ref 15–41)
BASOPHILS # BLD AUTO: 0.01 X10(3) UL (ref 0–0.1)
BASOPHILS NFR BLD AUTO: 0.1 %
BILIRUB SERPL-MCNC: 0.4 MG/DL (ref 0.1–2)
BUN BLD-MCNC: 15 MG/DL (ref 8–20)
CALCIUM BLD-MCNC: 9 MG/DL (ref 8.3–10.3)
CHLORIDE: 104 MMOL/L (ref 101–111)
CO2: 22 MMOL/L (ref 22–32)
CREAT BLD-MCNC: 0.51 MG/DL (ref 0.55–1.02)
EOSINOPHIL # BLD AUTO: 0 X10(3) UL (ref 0–0.3)
EOSINOPHIL NFR BLD AUTO: 0 %
ERYTHROCYTE [DISTWIDTH] IN BLOOD BY AUTOMATED COUNT: 19.5 % (ref 11.5–16)
GLUCOSE BLD-MCNC: 134 MG/DL (ref 70–99)
HCT VFR BLD AUTO: 27.8 % (ref 34–50)
HGB BLD-MCNC: 9.1 G/DL (ref 12–16)
IMMATURE GRANULOCYTE COUNT: 0.04 X10(3) UL (ref 0–1)
IMMATURE GRANULOCYTE RATIO %: 0.5 %
LYMPHOCYTES # BLD AUTO: 0.85 X10(3) UL (ref 0.9–4)
LYMPHOCYTES NFR BLD AUTO: 10.3 %
M PROTEIN MFR SERPL ELPH: 6.4 G/DL (ref 6.1–8.3)
MCH RBC QN AUTO: 32.3 PG (ref 27–33.2)
MCHC RBC AUTO-ENTMCNC: 32.7 G/DL (ref 31–37)
MCV RBC AUTO: 98.6 FL (ref 81–100)
MONOCYTES # BLD AUTO: 0.14 X10(3) UL (ref 0.1–0.6)
MONOCYTES NFR BLD AUTO: 1.7 %
NEUTROPHIL ABS PRELIM: 7.21 X10 (3) UL (ref 1.3–6.7)
NEUTROPHILS # BLD AUTO: 7.21 X10(3) UL (ref 1.3–6.7)
NEUTROPHILS NFR BLD AUTO: 87.4 %
PLATELET # BLD AUTO: 275 10(3)UL (ref 150–450)
PLATELET MORPHOLOGY: NORMAL
POTASSIUM SERPL-SCNC: 4 MMOL/L (ref 3.6–5.1)
RBC # BLD AUTO: 2.82 X10(6)UL (ref 3.8–5.1)
RED CELL DISTRIBUTION WIDTH-SD: 70.3 FL (ref 35.1–46.3)
SODIUM SERPL-SCNC: 136 MMOL/L (ref 136–144)
WBC # BLD AUTO: 8.3 X10(3) UL (ref 4–13)

## 2017-07-17 PROCEDURE — 96372 THER/PROPH/DIAG INJ SC/IM: CPT

## 2017-07-17 PROCEDURE — 99214 OFFICE O/P EST MOD 30 MIN: CPT | Performed by: INTERNAL MEDICINE

## 2017-07-17 PROCEDURE — 96413 CHEMO IV INFUSION 1 HR: CPT

## 2017-07-17 PROCEDURE — 96375 TX/PRO/DX INJ NEW DRUG ADDON: CPT

## 2017-07-17 PROCEDURE — 80053 COMPREHEN METABOLIC PANEL: CPT

## 2017-07-17 PROCEDURE — 96417 CHEMO IV INFUS EACH ADDL SEQ: CPT

## 2017-07-17 PROCEDURE — 85025 COMPLETE CBC W/AUTO DIFF WBC: CPT

## 2017-07-17 RX ORDER — LORAZEPAM 0.5 MG/1
TABLET ORAL EVERY 4 HOURS PRN
Status: CANCELLED | OUTPATIENT
Start: 2017-07-17

## 2017-07-17 RX ORDER — METOCLOPRAMIDE HYDROCHLORIDE 5 MG/ML
10 INJECTION INTRAMUSCULAR; INTRAVENOUS EVERY 6 HOURS PRN
Status: CANCELLED | OUTPATIENT
Start: 2017-07-17

## 2017-07-17 RX ORDER — SODIUM CHLORIDE 9 MG/ML
INJECTION, SOLUTION INTRAVENOUS ONCE
Status: CANCELLED
Start: 2017-07-17 | End: 2017-07-17

## 2017-07-17 RX ORDER — SODIUM CHLORIDE 0.9 % (FLUSH) 0.9 %
10 SYRINGE (ML) INJECTION ONCE
Status: CANCELLED | OUTPATIENT
Start: 2017-07-17

## 2017-07-17 RX ORDER — PROCHLORPERAZINE MALEATE 10 MG
10 TABLET ORAL EVERY 6 HOURS PRN
Status: CANCELLED | OUTPATIENT
Start: 2017-07-17

## 2017-07-17 RX ORDER — SODIUM CHLORIDE 0.9 % (FLUSH) 0.9 %
10 SYRINGE (ML) INJECTION ONCE
Status: COMPLETED | OUTPATIENT
Start: 2017-07-17 | End: 2017-07-17

## 2017-07-17 RX ORDER — ONDANSETRON 2 MG/ML
8 INJECTION INTRAMUSCULAR; INTRAVENOUS EVERY 6 HOURS PRN
Status: CANCELLED | OUTPATIENT
Start: 2017-07-17

## 2017-07-17 RX ORDER — LORAZEPAM 2 MG/ML
INJECTION INTRAMUSCULAR EVERY 4 HOURS PRN
Status: CANCELLED | OUTPATIENT
Start: 2017-07-17

## 2017-07-17 RX ADMIN — SODIUM CHLORIDE 0.9 % (FLUSH) 10 ML: 0.9 % SYRINGE (ML) INJECTION at 14:45:00

## 2017-07-17 NOTE — PATIENT INSTRUCTIONS
Education Record     Learner:  Patient     Disease / Wandalee Challenger cancer     Barriers / Limitations:  None                          Comments:     Method:  Brief focused and Reinforcement                          Comments:     General Topics:  Medicatio

## 2017-07-17 NOTE — PROGRESS NOTES
Cancer Center Progress Note    Problem List:      Patient Active Problem List:     De Quervain's syndrome (tenosynovitis)     Osteopenia     Breast cancer metastasized to axillary lymph node ([facility] [address]     Other specified counseling     Dyspnea     COPD (chroni Fumarate (SYMBICORT IN) Inhale into the lungs daily.    Disp:  Rfl:    Pantoprazole Sodium 40 MG Oral Tab EC Take 1 tablet (40 mg total) by mouth every morning before breakfast. Disp: 30 tablet Rfl: 6   dexamethasone (DECADRON) 4 MG tablet Take 2 tablets (8 07/17/2017   BILT 0.4 07/17/2017   ALB 3.3 (L) 07/17/2017   TP 6.4 07/17/2017           Impression:     Infiltrating ductal carcinoma of the upper outer right breast  Right axillary node metastases  ER 10%  DC 0%  HER2 3+  Treatment related diarrhea and ab

## 2017-07-20 ENCOUNTER — PRIOR ORIGINAL RECORDS (OUTPATIENT)
Dept: OTHER | Age: 72
End: 2017-07-20

## 2017-07-21 ENCOUNTER — OFFICE VISIT (OUTPATIENT)
Dept: HEMATOLOGY/ONCOLOGY | Facility: HOSPITAL | Age: 72
End: 2017-07-21
Attending: INTERNAL MEDICINE
Payer: MEDICARE

## 2017-07-21 VITALS
TEMPERATURE: 99 F | BODY MASS INDEX: 22 KG/M2 | RESPIRATION RATE: 18 BRPM | HEART RATE: 85 BPM | SYSTOLIC BLOOD PRESSURE: 79 MMHG | WEIGHT: 112 LBS | DIASTOLIC BLOOD PRESSURE: 47 MMHG

## 2017-07-21 DIAGNOSIS — C77.3 BREAST CANCER METASTASIZED TO AXILLARY LYMPH NODE, RIGHT (HCC): Primary | ICD-10-CM

## 2017-07-21 DIAGNOSIS — C50.911 BREAST CANCER METASTASIZED TO AXILLARY LYMPH NODE, RIGHT (HCC): Primary | ICD-10-CM

## 2017-07-21 LAB
BUN BLD-MCNC: 19 MG/DL (ref 8–20)
BUN: 17 MG/DL
BUN: 4 MG/DL
BUN: 5 MG/DL
CALCIUM BLD-MCNC: 8.3 MG/DL (ref 8.3–10.3)
CALCIUM: 8.2 MG/DL
CALCIUM: 8.5 MG/DL
CALCIUM: 8.7 MG/DL
CHLORIDE: 101 MEQ/L
CHLORIDE: 102 MEQ/L
CHLORIDE: 103 MMOL/L (ref 101–111)
CHLORIDE: 105 MEQ/L
CO2: 25 MMOL/L (ref 22–32)
CREAT BLD-MCNC: 0.79 MG/DL (ref 0.55–1.02)
CREATININE, SERUM: 0.62 MG/DL
CREATININE, SERUM: 0.69 MG/DL
CREATININE, SERUM: 0.7 MG/DL
GLUCOSE BLD-MCNC: 115 MG/DL (ref 70–99)
GLUCOSE: 102 MG/DL
GLUCOSE: 111 MG/DL
GLUCOSE: 87 MG/DL
MAGNESIUM: 1.5 MG/DL
MAGNESIUM: 1.6 MG/DL
POTASSIUM SERPL-SCNC: 3.6 MMOL/L (ref 3.6–5.1)
POTASSIUM, SERUM: 3.3 MEQ/L
POTASSIUM, SERUM: 3.6 MEQ/L
POTASSIUM, SERUM: 4 MEQ/L
SODIUM SERPL-SCNC: 136 MMOL/L (ref 136–144)
SODIUM: 136 MEQ/L
SODIUM: 136 MEQ/L
SODIUM: 139 MEQ/L

## 2017-07-21 PROCEDURE — 96361 HYDRATE IV INFUSION ADD-ON: CPT

## 2017-07-21 PROCEDURE — 96360 HYDRATION IV INFUSION INIT: CPT

## 2017-07-21 PROCEDURE — 96375 TX/PRO/DX INJ NEW DRUG ADDON: CPT

## 2017-07-21 PROCEDURE — 80048 BASIC METABOLIC PNL TOTAL CA: CPT

## 2017-07-21 RX ORDER — SODIUM CHLORIDE 0.9 % (FLUSH) 0.9 %
10 SYRINGE (ML) INJECTION ONCE
Status: COMPLETED | OUTPATIENT
Start: 2017-07-21 | End: 2017-07-21

## 2017-07-21 RX ORDER — SODIUM CHLORIDE 0.9 % (FLUSH) 0.9 %
10 SYRINGE (ML) INJECTION ONCE
Status: CANCELLED | OUTPATIENT
Start: 2017-07-21

## 2017-07-21 RX ORDER — SODIUM CHLORIDE 9 MG/ML
INJECTION, SOLUTION INTRAVENOUS ONCE
Status: COMPLETED | OUTPATIENT
Start: 2017-07-21 | End: 2017-07-21

## 2017-07-21 RX ORDER — SODIUM CHLORIDE 9 MG/ML
INJECTION, SOLUTION INTRAVENOUS ONCE
Status: CANCELLED
Start: 2017-07-21 | End: 2017-07-21

## 2017-07-21 RX ADMIN — SODIUM CHLORIDE: 9 INJECTION, SOLUTION INTRAVENOUS at 14:29:00

## 2017-07-21 RX ADMIN — SODIUM CHLORIDE 0.9 % (FLUSH) 10 ML: 0.9 % SYRINGE (ML) INJECTION at 16:33:00

## 2017-07-24 ENCOUNTER — OFFICE VISIT (OUTPATIENT)
Dept: HEMATOLOGY/ONCOLOGY | Facility: HOSPITAL | Age: 72
End: 2017-07-24
Attending: INTERNAL MEDICINE
Payer: MEDICARE

## 2017-07-24 VITALS
BODY MASS INDEX: 22 KG/M2 | TEMPERATURE: 98 F | SYSTOLIC BLOOD PRESSURE: 108 MMHG | HEART RATE: 90 BPM | WEIGHT: 112 LBS | DIASTOLIC BLOOD PRESSURE: 55 MMHG | RESPIRATION RATE: 18 BRPM

## 2017-07-24 DIAGNOSIS — C50.911 BREAST CANCER METASTASIZED TO AXILLARY LYMPH NODE, RIGHT (HCC): ICD-10-CM

## 2017-07-24 DIAGNOSIS — C77.3 BREAST CANCER METASTASIZED TO AXILLARY LYMPH NODE, RIGHT (HCC): ICD-10-CM

## 2017-07-24 DIAGNOSIS — R19.7 DIARRHEA WITH DEHYDRATION: Primary | ICD-10-CM

## 2017-07-24 LAB
BAND %: 9 %
BASOPHIL % MANUAL: 0 %
BASOPHIL ABSOLUTE MANUAL: 0 X10(3) UL (ref 0–0.1)
BUN BLD-MCNC: 12 MG/DL (ref 8–20)
CALCIUM BLD-MCNC: 8.3 MG/DL (ref 8.3–10.3)
CHLORIDE: 102 MMOL/L (ref 101–111)
CO2: 25 MMOL/L (ref 22–32)
CREAT BLD-MCNC: 0.93 MG/DL (ref 0.55–1.02)
EOSINOPHIL % MANUAL: 0 %
EOSINOPHIL ABSOLUTE MANUAL: 0 X10(3) UL (ref 0–0.3)
ERYTHROCYTE [DISTWIDTH] IN BLOOD BY AUTOMATED COUNT: 17.2 % (ref 11.5–16)
GLUCOSE BLD-MCNC: 108 MG/DL (ref 70–99)
HCT VFR BLD AUTO: 28.8 % (ref 34–50)
HGB BLD-MCNC: 9.3 G/DL (ref 12–16)
LYMPHOCYTE % MANUAL: 22 %
LYMPHOCYTE ABSOLUTE MANUAL: 2.64 X10(3) UL (ref 0.9–4)
MCH RBC QN AUTO: 32.5 PG (ref 27–33.2)
MCHC RBC AUTO-ENTMCNC: 32.3 G/DL (ref 31–37)
MCV RBC AUTO: 100.7 FL (ref 81–100)
MONOCYTE % MANUAL: 12 %
MONOCYTE ABSOLUTE MANUAL: 1.44 X10(3) UL (ref 0.1–0.6)
MORPHOLOGY: NORMAL
NEUTROPHIL ABS PRELIM: 6.8 X10 (3) UL (ref 1.3–6.7)
NEUTROPHIL ABSOLUTE MANUAL: 7.92 X10(3) UL (ref 1.3–6.7)
NEUTROPHILS % MANUAL: 57 %
PLATELET # BLD AUTO: 49 10(3)UL (ref 150–450)
PLATELET MORPHOLOGY: NORMAL
POTASSIUM SERPL-SCNC: 3.7 MMOL/L (ref 3.6–5.1)
RBC # BLD AUTO: 2.86 X10(6)UL (ref 3.8–5.1)
RED CELL DISTRIBUTION WIDTH-SD: 64 FL (ref 35.1–46.3)
SODIUM SERPL-SCNC: 137 MMOL/L (ref 136–144)
TOTAL CELLS COUNTED: 100
WBC # BLD AUTO: 12 X10(3) UL (ref 4–13)

## 2017-07-24 PROCEDURE — 96361 HYDRATE IV INFUSION ADD-ON: CPT

## 2017-07-24 PROCEDURE — 85025 COMPLETE CBC W/AUTO DIFF WBC: CPT

## 2017-07-24 PROCEDURE — 96360 HYDRATION IV INFUSION INIT: CPT

## 2017-07-24 PROCEDURE — 85007 BL SMEAR W/DIFF WBC COUNT: CPT

## 2017-07-24 PROCEDURE — 80048 BASIC METABOLIC PNL TOTAL CA: CPT

## 2017-07-24 PROCEDURE — 85027 COMPLETE CBC AUTOMATED: CPT

## 2017-07-24 RX ORDER — SODIUM CHLORIDE 0.9 % (FLUSH) 0.9 %
10 SYRINGE (ML) INJECTION ONCE
Status: COMPLETED | OUTPATIENT
Start: 2017-07-24 | End: 2017-07-24

## 2017-07-24 RX ORDER — SODIUM CHLORIDE 9 MG/ML
INJECTION, SOLUTION INTRAVENOUS ONCE
Status: CANCELLED
Start: 2017-07-24 | End: 2017-07-24

## 2017-07-24 RX ORDER — SODIUM CHLORIDE 9 MG/ML
INJECTION, SOLUTION INTRAVENOUS ONCE
Status: COMPLETED | OUTPATIENT
Start: 2017-07-24 | End: 2017-07-24

## 2017-07-24 RX ORDER — SODIUM CHLORIDE 0.9 % (FLUSH) 0.9 %
10 SYRINGE (ML) INJECTION ONCE
Status: CANCELLED | OUTPATIENT
Start: 2017-07-24

## 2017-07-24 RX ADMIN — SODIUM CHLORIDE: 9 INJECTION, SOLUTION INTRAVENOUS at 12:25:00

## 2017-07-24 RX ADMIN — SODIUM CHLORIDE 0.9 % (FLUSH) 10 ML: 0.9 % SYRINGE (ML) INJECTION at 14:32:00

## 2017-07-26 ENCOUNTER — APPOINTMENT (OUTPATIENT)
Dept: HEMATOLOGY/ONCOLOGY | Facility: HOSPITAL | Age: 72
End: 2017-07-26
Attending: INTERNAL MEDICINE
Payer: MEDICARE

## 2017-07-27 ENCOUNTER — OFFICE VISIT (OUTPATIENT)
Dept: HEMATOLOGY/ONCOLOGY | Facility: HOSPITAL | Age: 72
End: 2017-07-27
Attending: INTERNAL MEDICINE
Payer: MEDICARE

## 2017-07-27 ENCOUNTER — PRIOR ORIGINAL RECORDS (OUTPATIENT)
Dept: OTHER | Age: 72
End: 2017-07-27

## 2017-07-27 VITALS
TEMPERATURE: 98 F | OXYGEN SATURATION: 97 % | HEART RATE: 69 BPM | BODY MASS INDEX: 22 KG/M2 | SYSTOLIC BLOOD PRESSURE: 90 MMHG | RESPIRATION RATE: 18 BRPM | DIASTOLIC BLOOD PRESSURE: 59 MMHG | WEIGHT: 111.19 LBS

## 2017-07-27 DIAGNOSIS — C50.911 BREAST CANCER METASTASIZED TO AXILLARY LYMPH NODE, RIGHT (HCC): Primary | ICD-10-CM

## 2017-07-27 DIAGNOSIS — C77.3 BREAST CANCER METASTASIZED TO AXILLARY LYMPH NODE, RIGHT (HCC): Primary | ICD-10-CM

## 2017-07-27 PROCEDURE — 96361 HYDRATE IV INFUSION ADD-ON: CPT

## 2017-07-27 PROCEDURE — 96360 HYDRATION IV INFUSION INIT: CPT

## 2017-07-27 RX ORDER — SODIUM CHLORIDE 0.9 % (FLUSH) 0.9 %
10 SYRINGE (ML) INJECTION ONCE
Status: COMPLETED | OUTPATIENT
Start: 2017-07-27 | End: 2017-07-27

## 2017-07-27 RX ORDER — SODIUM CHLORIDE 9 MG/ML
INJECTION, SOLUTION INTRAVENOUS ONCE
Status: CANCELLED
Start: 2017-07-27 | End: 2017-07-27

## 2017-07-27 RX ORDER — SODIUM CHLORIDE 0.9 % (FLUSH) 0.9 %
10 SYRINGE (ML) INJECTION ONCE
Status: CANCELLED | OUTPATIENT
Start: 2017-07-27

## 2017-07-27 RX ORDER — CARVEDILOL 3.12 MG/1
3.12 TABLET ORAL 2 TIMES DAILY WITH MEALS
COMMUNITY
End: 2017-07-27

## 2017-07-27 RX ORDER — SODIUM CHLORIDE 9 MG/ML
1000 INJECTION, SOLUTION INTRAVENOUS ONCE
Status: COMPLETED | OUTPATIENT
Start: 2017-07-27 | End: 2017-07-27

## 2017-07-27 RX ADMIN — SODIUM CHLORIDE 1000 ML: 9 INJECTION, SOLUTION INTRAVENOUS at 10:15:00

## 2017-07-27 RX ADMIN — SODIUM CHLORIDE 0.9 % (FLUSH) 10 ML: 0.9 % SYRINGE (ML) INJECTION at 12:20:00

## 2017-07-27 NOTE — PROGRESS NOTES
Patient states she's feeling slightly better - diarrhea controlled w/ imodium. No diarrhea today. States she still feels dizzy. Orthostatics WNL.  Per patient's daughter, patient has been having increased dizziness since starting carvedilol as prescribed by

## 2017-07-31 ENCOUNTER — SNF/IP PROF CHARGE ONLY (OUTPATIENT)
Dept: HEMATOLOGY/ONCOLOGY | Facility: HOSPITAL | Age: 72
End: 2017-07-31

## 2017-07-31 DIAGNOSIS — C77.3 CARCINOMA OF BREAST METASTATIC TO AXILLARY LYMPH NODE, UNSPECIFIED LATERALITY (HCC): ICD-10-CM

## 2017-07-31 DIAGNOSIS — C50.919 CARCINOMA OF BREAST METASTATIC TO AXILLARY LYMPH NODE, UNSPECIFIED LATERALITY (HCC): ICD-10-CM

## 2017-07-31 PROCEDURE — G9678 ONCOLOGY CARE MODEL SERVICE: HCPCS | Performed by: INTERNAL MEDICINE

## 2017-08-04 ENCOUNTER — HOSPITAL ENCOUNTER (OUTPATIENT)
Dept: GENERAL RADIOLOGY | Facility: HOSPITAL | Age: 72
Discharge: HOME | End: 2017-08-04
Attending: CLINICAL NURSE SPECIALIST | Admitting: INTERNAL MEDICINE
Payer: MEDICARE

## 2017-08-04 ENCOUNTER — TELEPHONE (OUTPATIENT)
Dept: HEMATOLOGY/ONCOLOGY | Facility: HOSPITAL | Age: 72
End: 2017-08-04

## 2017-08-04 ENCOUNTER — OFFICE VISIT (OUTPATIENT)
Dept: HEMATOLOGY/ONCOLOGY | Facility: HOSPITAL | Age: 72
End: 2017-08-04
Attending: INTERNAL MEDICINE
Payer: MEDICARE

## 2017-08-04 VITALS
RESPIRATION RATE: 18 BRPM | TEMPERATURE: 98 F | OXYGEN SATURATION: 97 % | SYSTOLIC BLOOD PRESSURE: 122 MMHG | HEART RATE: 80 BPM | DIASTOLIC BLOOD PRESSURE: 69 MMHG

## 2017-08-04 VITALS
SYSTOLIC BLOOD PRESSURE: 102 MMHG | HEART RATE: 84 BPM | HEIGHT: 59.96 IN | BODY MASS INDEX: 22.81 KG/M2 | TEMPERATURE: 98 F | RESPIRATION RATE: 18 BRPM | DIASTOLIC BLOOD PRESSURE: 71 MMHG | WEIGHT: 116.19 LBS

## 2017-08-04 VITALS — OXYGEN SATURATION: 100 %

## 2017-08-04 DIAGNOSIS — D64.9 SYMPTOMATIC ANEMIA: ICD-10-CM

## 2017-08-04 DIAGNOSIS — R07.9 CHEST PAIN SYNDROME: ICD-10-CM

## 2017-08-04 DIAGNOSIS — R06.00 DYSPNEA ON EXERTION: ICD-10-CM

## 2017-08-04 DIAGNOSIS — T45.1X5D ADVERSE EFFECT OF CHEMOTHERAPY, SUBSEQUENT ENCOUNTER: ICD-10-CM

## 2017-08-04 DIAGNOSIS — C50.911 BREAST CANCER METASTASIZED TO AXILLARY LYMPH NODE, RIGHT (HCC): Primary | ICD-10-CM

## 2017-08-04 DIAGNOSIS — C77.3 BREAST CANCER METASTASIZED TO AXILLARY LYMPH NODE, UNSPECIFIED LATERALITY (HCC): ICD-10-CM

## 2017-08-04 DIAGNOSIS — J43.8 OTHER EMPHYSEMA (HCC): ICD-10-CM

## 2017-08-04 DIAGNOSIS — R30.0 DYSURIA: ICD-10-CM

## 2017-08-04 DIAGNOSIS — C77.3 BREAST CANCER METASTASIZED TO AXILLARY LYMPH NODE, RIGHT (HCC): Primary | ICD-10-CM

## 2017-08-04 DIAGNOSIS — C50.919 BREAST CANCER METASTASIZED TO AXILLARY LYMPH NODE, UNSPECIFIED LATERALITY (HCC): ICD-10-CM

## 2017-08-04 DIAGNOSIS — R19.7 DIARRHEA WITH DEHYDRATION: Primary | ICD-10-CM

## 2017-08-04 DIAGNOSIS — C50.919 BREAST CANCER METASTASIZED TO AXILLARY LYMPH NODE, UNSPECIFIED LATERALITY (HCC): Primary | ICD-10-CM

## 2017-08-04 DIAGNOSIS — K59.03 DRUG-INDUCED CONSTIPATION: ICD-10-CM

## 2017-08-04 DIAGNOSIS — C77.3 BREAST CANCER METASTASIZED TO AXILLARY LYMPH NODE, UNSPECIFIED LATERALITY (HCC): Primary | ICD-10-CM

## 2017-08-04 LAB
ALBUMIN SERPL-MCNC: 3.2 G/DL (ref 3.5–4.8)
ALP LIVER SERPL-CCNC: 104 U/L (ref 55–142)
ALT SERPL-CCNC: 20 U/L (ref 14–54)
ANTIBODY SCREEN: NEGATIVE
AST SERPL-CCNC: 15 U/L (ref 15–41)
BASOPHILS # BLD AUTO: 0.02 X10(3) UL (ref 0–0.1)
BASOPHILS NFR BLD AUTO: 0.2 %
BILIRUB SERPL-MCNC: 0.3 MG/DL (ref 0.1–2)
BILIRUB UR QL STRIP.AUTO: NEGATIVE
BUN BLD-MCNC: 9 MG/DL (ref 8–20)
CALCIUM BLD-MCNC: 8.2 MG/DL (ref 8.3–10.3)
CHLORIDE: 107 MMOL/L (ref 101–111)
CLARITY UR REFRACT.AUTO: CLEAR
CO2: 24 MMOL/L (ref 22–32)
COLOR UR AUTO: YELLOW
CREAT BLD-MCNC: 0.68 MG/DL (ref 0.55–1.02)
EOSINOPHIL # BLD AUTO: 0.01 X10(3) UL (ref 0–0.3)
EOSINOPHIL NFR BLD AUTO: 0.1 %
ERYTHROCYTE [DISTWIDTH] IN BLOOD BY AUTOMATED COUNT: 18.3 % (ref 11.5–16)
GLUCOSE BLD-MCNC: 92 MG/DL (ref 70–99)
GLUCOSE UR STRIP.AUTO-MCNC: NEGATIVE MG/DL
HAV IGM SER QL: 1.4 MG/DL (ref 1.7–3)
HCT VFR BLD AUTO: 25.8 % (ref 34–50)
HGB BLD-MCNC: 8.3 G/DL (ref 12–16)
IMMATURE GRANULOCYTE COUNT: 0.04 X10(3) UL (ref 0–1)
IMMATURE GRANULOCYTE RATIO %: 0.5 %
KETONES UR STRIP.AUTO-MCNC: NEGATIVE MG/DL
LEUKOCYTE ESTERASE UR QL STRIP.AUTO: NEGATIVE
LYMPHOCYTES # BLD AUTO: 2.71 X10(3) UL (ref 0.9–4)
LYMPHOCYTES NFR BLD AUTO: 30.8 %
M PROTEIN MFR SERPL ELPH: 5.8 G/DL (ref 6.1–8.3)
MCH RBC QN AUTO: 33.3 PG (ref 27–33.2)
MCHC RBC AUTO-ENTMCNC: 32.2 G/DL (ref 31–37)
MCV RBC AUTO: 103.6 FL (ref 81–100)
MONOCYTES # BLD AUTO: 0.75 X10(3) UL (ref 0.1–0.6)
MONOCYTES NFR BLD AUTO: 8.5 %
NEUTROPHIL ABS PRELIM: 5.27 X10 (3) UL (ref 1.3–6.7)
NEUTROPHILS # BLD AUTO: 5.27 X10(3) UL (ref 1.3–6.7)
NEUTROPHILS NFR BLD AUTO: 59.9 %
NITRITE UR QL STRIP.AUTO: NEGATIVE
PH UR STRIP.AUTO: 5 [PH] (ref 4.5–8)
PLATELET # BLD AUTO: 137 10(3)UL (ref 150–450)
PLATELET MORPHOLOGY: NORMAL
POTASSIUM SERPL-SCNC: 4.2 MMOL/L (ref 3.6–5.1)
PROT UR STRIP.AUTO-MCNC: NEGATIVE MG/DL
RBC # BLD AUTO: 2.49 X10(6)UL (ref 3.8–5.1)
RBC UR QL AUTO: NEGATIVE
RED CELL DISTRIBUTION WIDTH-SD: 67.1 FL (ref 35.1–46.3)
RH BLOOD TYPE: NEGATIVE
SODIUM SERPL-SCNC: 139 MMOL/L (ref 136–144)
SP GR UR STRIP.AUTO: 1.01 (ref 1–1.03)
TROPONIN: <0.046 NG/ML (ref ?–0.05)
UROBILINOGEN UR STRIP.AUTO-MCNC: <2 MG/DL
WBC # BLD AUTO: 8.8 X10(3) UL (ref 4–13)

## 2017-08-04 PROCEDURE — 86901 BLOOD TYPING SEROLOGIC RH(D): CPT

## 2017-08-04 PROCEDURE — 99215 OFFICE O/P EST HI 40 MIN: CPT | Performed by: CLINICAL NURSE SPECIALIST

## 2017-08-04 PROCEDURE — 85025 COMPLETE CBC W/AUTO DIFF WBC: CPT

## 2017-08-04 PROCEDURE — 83735 ASSAY OF MAGNESIUM: CPT

## 2017-08-04 PROCEDURE — 36430 TRANSFUSION BLD/BLD COMPNT: CPT

## 2017-08-04 PROCEDURE — 96365 THER/PROPH/DIAG IV INF INIT: CPT

## 2017-08-04 PROCEDURE — 80053 COMPREHEN METABOLIC PANEL: CPT

## 2017-08-04 PROCEDURE — 30243N1 TRANSFUSION OF NONAUTOLOGOUS RED BLOOD CELLS INTO CENTRAL VEIN, PERCUTANEOUS APPROACH: ICD-10-PCS | Performed by: INTERNAL MEDICINE

## 2017-08-04 PROCEDURE — 86920 COMPATIBILITY TEST SPIN: CPT

## 2017-08-04 PROCEDURE — 86850 RBC ANTIBODY SCREEN: CPT

## 2017-08-04 PROCEDURE — 71020 XR CHEST PA + LAT CHEST (CPT=71020): CPT | Performed by: CLINICAL NURSE SPECIALIST

## 2017-08-04 PROCEDURE — 96366 THER/PROPH/DIAG IV INF ADDON: CPT

## 2017-08-04 PROCEDURE — 87086 URINE CULTURE/COLONY COUNT: CPT

## 2017-08-04 PROCEDURE — 86900 BLOOD TYPING SEROLOGIC ABO: CPT

## 2017-08-04 PROCEDURE — 81003 URINALYSIS AUTO W/O SCOPE: CPT

## 2017-08-04 PROCEDURE — 84484 ASSAY OF TROPONIN QUANT: CPT

## 2017-08-04 RX ORDER — SODIUM CHLORIDE 9 MG/ML
INJECTION, SOLUTION INTRAVENOUS ONCE
Status: CANCELLED
Start: 2017-08-04 | End: 2017-08-04

## 2017-08-04 RX ORDER — DIPHENHYDRAMINE HCL 25 MG
25 CAPSULE ORAL ONCE
Status: CANCELLED | OUTPATIENT
Start: 2017-08-04

## 2017-08-04 RX ORDER — SODIUM CHLORIDE 0.9 % (FLUSH) 0.9 %
10 SYRINGE (ML) INJECTION ONCE
Status: CANCELLED | OUTPATIENT
Start: 2017-08-04

## 2017-08-04 RX ORDER — SODIUM CHLORIDE 9 MG/ML
INJECTION, SOLUTION INTRAVENOUS ONCE
Status: COMPLETED | OUTPATIENT
Start: 2017-08-04 | End: 2017-08-04

## 2017-08-04 RX ORDER — ACETAMINOPHEN 325 MG/1
650 TABLET ORAL ONCE
Status: COMPLETED | OUTPATIENT
Start: 2017-08-04 | End: 2017-08-04

## 2017-08-04 RX ORDER — ACETAMINOPHEN 325 MG/1
650 TABLET ORAL ONCE
Status: CANCELLED | OUTPATIENT
Start: 2017-08-04

## 2017-08-04 RX ORDER — DIPHENHYDRAMINE HCL 25 MG
25 CAPSULE ORAL ONCE
Status: COMPLETED | OUTPATIENT
Start: 2017-08-04 | End: 2017-08-04

## 2017-08-04 RX ORDER — SODIUM CHLORIDE 0.9 % (FLUSH) 0.9 %
10 SYRINGE (ML) INJECTION ONCE
Status: DISCONTINUED | OUTPATIENT
Start: 2017-08-04 | End: 2017-08-04

## 2017-08-04 RX ADMIN — SODIUM CHLORIDE: 9 INJECTION, SOLUTION INTRAVENOUS at 17:16:00

## 2017-08-04 RX ADMIN — DIPHENHYDRAMINE HCL 25 MG: 25 MG CAPSULE ORAL at 17:16:00

## 2017-08-04 RX ADMIN — ACETAMINOPHEN 650 MG: 325 TABLET ORAL at 17:16:00

## 2017-08-04 NOTE — TELEPHONE ENCOUNTER
Patient's daughter states the patient has had mild diarrhea all week. She state today she has decrease urine output and she has some SOB on exertion. She would like to bring the patient for APN assess and fluids.  Ok per [employer] APN and time provided f

## 2017-08-04 NOTE — PROGRESS NOTES
Pt arrived for APN assessment and CLL with IVF, pt states having numerous diarrhea stools  With SOB/weakness with minimal exertion, after lab draw performed pt was low on mag and will receive magnesium with her IVF, pt appears in nad, pt can ambulate only

## 2017-08-04 NOTE — PROGRESS NOTES
[facility] Progress Note    Patient Name: Pankaj Garrett   YOB: 1945   Medical Record Number: [id number]   CSN: [id number]   Date of visit: 8/4/2017   Provider: JORGE Terry  Referring Physician: No ref.  provider found    Proble stiffness. Woke up with neck pain yesterday. No trauma. Pain when she turns head to the right. No cramping. Oral nutrition is NL. She has no cough. No fever. She has rhinorrhea. No edema.     Allergies:  No Known Allergies    Vital Signs:  SpO2 100 KIT, 3 MG INTRAVENOUSLY EVERY 3 MONTHS (Patient not taking: Reported on 5/15/2017), Disp: 0.9 Kit, Rfl: 3    Review of Systems:   As in HPI    Physical Examination:  General: Awake, alert, oriented x3, no acute distress.     HEENT:  Anicteric, conjunctivae 100.0 fL   MCH 33.3 (H) 27.0 - 33.2 pg   MCHC 32.2 31.0 - 37.0 g/dL   RDW 18.3 (H) 11.5 - 16.0 %   RDW-SD 67.1 (H) 35.1 - 46.3 fL   Neutrophil Absolute Prelim 5.27 1.30 - 6.70 x10 (3) uL   Neutrophil Absolute 5.27 1.30 - 6.70 x10(3) uL   Lymphocyte Absolut Chest ache:  Neg troponin. Improved with Tums. Likely secondary to GERD. On Protonix and PRN Tums. 7.  Urinary symptoms:  Await culture result. Low urine output likely due to dehydration.     8.  Breast cancer:  C6 TCH scheduled on 8/7.    9.  Brian Alatorre

## 2017-08-06 LAB — BLOOD TYPE BARCODE: 1700

## 2017-08-07 ENCOUNTER — OFFICE VISIT (OUTPATIENT)
Dept: HEMATOLOGY/ONCOLOGY | Facility: HOSPITAL | Age: 72
End: 2017-08-07
Attending: INTERNAL MEDICINE
Payer: MEDICARE

## 2017-08-07 VITALS
DIASTOLIC BLOOD PRESSURE: 72 MMHG | BODY MASS INDEX: 22.7 KG/M2 | TEMPERATURE: 98 F | OXYGEN SATURATION: 95 % | SYSTOLIC BLOOD PRESSURE: 129 MMHG | WEIGHT: 115.63 LBS | RESPIRATION RATE: 18 BRPM | HEART RATE: 83 BPM | HEIGHT: 59.96 IN

## 2017-08-07 DIAGNOSIS — C50.411 MALIGNANT NEOPLASM OF UPPER-OUTER QUADRANT OF RIGHT BREAST IN FEMALE, ESTROGEN RECEPTOR POSITIVE (HCC): Primary | ICD-10-CM

## 2017-08-07 DIAGNOSIS — C50.911 BREAST CANCER METASTASIZED TO AXILLARY LYMPH NODE, RIGHT (HCC): Primary | ICD-10-CM

## 2017-08-07 DIAGNOSIS — C50.911 BREAST CANCER METASTASIZED TO AXILLARY LYMPH NODE, RIGHT (HCC): ICD-10-CM

## 2017-08-07 DIAGNOSIS — C77.3 BREAST CANCER METASTASIZED TO AXILLARY LYMPH NODE, RIGHT (HCC): ICD-10-CM

## 2017-08-07 DIAGNOSIS — C77.3 BREAST CANCER METASTASIZED TO AXILLARY LYMPH NODE, RIGHT (HCC): Primary | ICD-10-CM

## 2017-08-07 DIAGNOSIS — Z17.0 MALIGNANT NEOPLASM OF UPPER-OUTER QUADRANT OF RIGHT BREAST IN FEMALE, ESTROGEN RECEPTOR POSITIVE (HCC): Primary | ICD-10-CM

## 2017-08-07 LAB
ALBUMIN SERPL-MCNC: 3.5 G/DL (ref 3.5–4.8)
ALP LIVER SERPL-CCNC: 108 U/L (ref 55–142)
ALT SERPL-CCNC: 23 U/L (ref 14–54)
AST SERPL-CCNC: 16 U/L (ref 15–41)
BASOPHILS # BLD AUTO: 0.01 X10(3) UL (ref 0–0.1)
BASOPHILS NFR BLD AUTO: 0.1 %
BILIRUB SERPL-MCNC: 0.3 MG/DL (ref 0.1–2)
BUN BLD-MCNC: 14 MG/DL (ref 8–20)
CALCIUM BLD-MCNC: 9 MG/DL (ref 8.3–10.3)
CHLORIDE: 106 MMOL/L (ref 101–111)
CO2: 24 MMOL/L (ref 22–32)
CREAT BLD-MCNC: 0.65 MG/DL (ref 0.55–1.02)
EOSINOPHIL # BLD AUTO: 0 X10(3) UL (ref 0–0.3)
EOSINOPHIL NFR BLD AUTO: 0 %
ERYTHROCYTE [DISTWIDTH] IN BLOOD BY AUTOMATED COUNT: 19.6 % (ref 11.5–16)
GLUCOSE BLD-MCNC: 136 MG/DL (ref 70–99)
HCT VFR BLD AUTO: 31.4 % (ref 34–50)
HGB BLD-MCNC: 10.3 G/DL (ref 12–16)
IMMATURE GRANULOCYTE COUNT: 0.05 X10(3) UL (ref 0–1)
IMMATURE GRANULOCYTE RATIO %: 0.4 %
LYMPHOCYTES # BLD AUTO: 0.98 X10(3) UL (ref 0.9–4)
LYMPHOCYTES NFR BLD AUTO: 8.4 %
M PROTEIN MFR SERPL ELPH: 6.6 G/DL (ref 6.1–8.3)
MCH RBC QN AUTO: 32.8 PG (ref 27–33.2)
MCHC RBC AUTO-ENTMCNC: 32.8 G/DL (ref 31–37)
MCV RBC AUTO: 100 FL (ref 81–100)
MONOCYTES # BLD AUTO: 0.17 X10(3) UL (ref 0.1–0.6)
MONOCYTES NFR BLD AUTO: 1.5 %
NEUTROPHIL ABS PRELIM: 10.41 X10 (3) UL (ref 1.3–6.7)
NEUTROPHILS # BLD AUTO: 10.41 X10(3) UL (ref 1.3–6.7)
NEUTROPHILS NFR BLD AUTO: 89.6 %
PLATELET # BLD AUTO: 312 10(3)UL (ref 150–450)
PLATELET MORPHOLOGY: NORMAL
POTASSIUM SERPL-SCNC: 4.5 MMOL/L (ref 3.6–5.1)
RBC # BLD AUTO: 3.14 X10(6)UL (ref 3.8–5.1)
RED CELL DISTRIBUTION WIDTH-SD: 72.4 FL (ref 35.1–46.3)
SODIUM SERPL-SCNC: 137 MMOL/L (ref 136–144)
WBC # BLD AUTO: 11.6 X10(3) UL (ref 4–13)

## 2017-08-07 PROCEDURE — 96417 CHEMO IV INFUS EACH ADDL SEQ: CPT

## 2017-08-07 PROCEDURE — 96375 TX/PRO/DX INJ NEW DRUG ADDON: CPT

## 2017-08-07 PROCEDURE — 99214 OFFICE O/P EST MOD 30 MIN: CPT | Performed by: INTERNAL MEDICINE

## 2017-08-07 PROCEDURE — 85025 COMPLETE CBC W/AUTO DIFF WBC: CPT

## 2017-08-07 PROCEDURE — 96413 CHEMO IV INFUSION 1 HR: CPT

## 2017-08-07 PROCEDURE — 96372 THER/PROPH/DIAG INJ SC/IM: CPT

## 2017-08-07 PROCEDURE — 80053 COMPREHEN METABOLIC PANEL: CPT

## 2017-08-07 RX ORDER — ONDANSETRON 2 MG/ML
8 INJECTION INTRAMUSCULAR; INTRAVENOUS EVERY 6 HOURS PRN
Status: CANCELLED | OUTPATIENT
Start: 2017-08-07

## 2017-08-07 RX ORDER — LORAZEPAM 0.5 MG/1
TABLET ORAL EVERY 4 HOURS PRN
Status: CANCELLED | OUTPATIENT
Start: 2017-08-07

## 2017-08-07 RX ORDER — SODIUM CHLORIDE 0.9 % (FLUSH) 0.9 %
10 SYRINGE (ML) INJECTION ONCE
Status: COMPLETED | OUTPATIENT
Start: 2017-08-07 | End: 2017-08-07

## 2017-08-07 RX ORDER — SODIUM CHLORIDE 0.9 % (FLUSH) 0.9 %
10 SYRINGE (ML) INJECTION ONCE
Status: CANCELLED | OUTPATIENT
Start: 2017-08-07

## 2017-08-07 RX ORDER — SODIUM CHLORIDE 9 MG/ML
INJECTION, SOLUTION INTRAVENOUS ONCE
Status: CANCELLED
Start: 2017-08-07 | End: 2017-08-07

## 2017-08-07 RX ORDER — LORAZEPAM 2 MG/ML
INJECTION INTRAMUSCULAR EVERY 4 HOURS PRN
Status: CANCELLED | OUTPATIENT
Start: 2017-08-07

## 2017-08-07 RX ORDER — METOCLOPRAMIDE HYDROCHLORIDE 5 MG/ML
10 INJECTION INTRAMUSCULAR; INTRAVENOUS EVERY 6 HOURS PRN
Status: CANCELLED | OUTPATIENT
Start: 2017-08-07

## 2017-08-07 RX ORDER — LISINOPRIL 10 MG/1
10 TABLET ORAL DAILY
COMMUNITY
End: 2017-10-11

## 2017-08-07 RX ORDER — FLUTICASONE PROPIONATE 0.05 %
CREAM (GRAM) TOPICAL
Qty: 30 TUBE | Refills: 0 | Status: SHIPPED | OUTPATIENT
Start: 2017-08-07 | End: 2019-04-30 | Stop reason: ALTCHOICE

## 2017-08-07 RX ORDER — PROCHLORPERAZINE MALEATE 10 MG
10 TABLET ORAL EVERY 6 HOURS PRN
Status: CANCELLED | OUTPATIENT
Start: 2017-08-07

## 2017-08-07 RX ORDER — ONDANSETRON HYDROCHLORIDE 8 MG/1
8 TABLET, FILM COATED ORAL EVERY 8 HOURS PRN
Qty: 30 TABLET | Refills: 0 | Status: SHIPPED | OUTPATIENT
Start: 2017-08-07 | End: 2017-08-29

## 2017-08-07 RX ADMIN — SODIUM CHLORIDE 0.9 % (FLUSH) 10 ML: 0.9 % SYRINGE (ML) INJECTION at 16:15:00

## 2017-08-07 NOTE — PROGRESS NOTES
Cancer Center Progress Note    Problem List:      Patient Active Problem List:     De Quervain's syndrome (tenosynovitis)     Osteopenia     Breast cancer metastasized to axillary lymph node ([facility] [address]     Other specified counseling     Dyspnea     COPD (chroni times daily as needed.  Disp: 90 tablet Rfl: 3   Fluticasone Propionate 50 MCG/ACT Nasal Suspension  Disp:  Rfl:    Spacer/Aero Chamber Mouthpiece Does not apply Misc Patient needs spacer to use inhaler for COPD Disp: 1 each Rfl: 0   Albuterol Sulfate HFA ( 31.4 (L) 08/07/2017   .0 08/07/2017   MCH 32.8 08/07/2017   MCHC 32.8 08/07/2017   RDW 19.6 (H) 08/07/2017   .0 08/07/2017       Lab Results  Component Value Date    08/07/2017   K 4.5 08/07/2017    08/07/2017   CO2 24.0 08/07/201

## 2017-08-07 NOTE — PROGRESS NOTES
Pt here for follow up and treatment. See toxicities. Pt complains of shortness of breath with minimal exertion.

## 2017-08-11 ENCOUNTER — APPOINTMENT (OUTPATIENT)
Dept: HEMATOLOGY/ONCOLOGY | Facility: HOSPITAL | Age: 72
End: 2017-08-11
Attending: INTERNAL MEDICINE
Payer: MEDICARE

## 2017-08-11 VITALS
BODY MASS INDEX: 22 KG/M2 | RESPIRATION RATE: 16 BRPM | DIASTOLIC BLOOD PRESSURE: 56 MMHG | WEIGHT: 114.38 LBS | SYSTOLIC BLOOD PRESSURE: 95 MMHG | TEMPERATURE: 96 F | HEART RATE: 66 BPM

## 2017-08-11 DIAGNOSIS — C50.911 BREAST CANCER METASTASIZED TO AXILLARY LYMPH NODE, RIGHT (HCC): Primary | ICD-10-CM

## 2017-08-11 DIAGNOSIS — C77.3 BREAST CANCER METASTASIZED TO AXILLARY LYMPH NODE, RIGHT (HCC): Primary | ICD-10-CM

## 2017-08-11 PROCEDURE — 96360 HYDRATION IV INFUSION INIT: CPT

## 2017-08-11 PROCEDURE — 96361 HYDRATE IV INFUSION ADD-ON: CPT

## 2017-08-11 RX ORDER — SODIUM CHLORIDE 9 MG/ML
1000 INJECTION, SOLUTION INTRAVENOUS ONCE
Status: COMPLETED | OUTPATIENT
Start: 2017-08-11 | End: 2017-08-11

## 2017-08-11 RX ORDER — SODIUM CHLORIDE 9 MG/ML
INJECTION, SOLUTION INTRAVENOUS ONCE
Status: CANCELLED
Start: 2017-08-11 | End: 2017-08-11

## 2017-08-11 RX ORDER — SODIUM CHLORIDE 0.9 % (FLUSH) 0.9 %
10 SYRINGE (ML) INJECTION ONCE
Status: CANCELLED | OUTPATIENT
Start: 2017-08-11

## 2017-08-11 RX ORDER — DIAZEPAM 5 MG/1
5 TABLET ORAL AS NEEDED
Qty: 4 TABLET | Refills: 0 | Status: SHIPPED | OUTPATIENT
Start: 2017-08-11 | End: 2017-08-29 | Stop reason: ALTCHOICE

## 2017-08-11 RX ORDER — SODIUM CHLORIDE 0.9 % (FLUSH) 0.9 %
10 SYRINGE (ML) INJECTION ONCE
Status: COMPLETED | OUTPATIENT
Start: 2017-08-11 | End: 2017-08-11

## 2017-08-11 RX ADMIN — SODIUM CHLORIDE 1000 ML: 9 INJECTION, SOLUTION INTRAVENOUS at 13:52:00

## 2017-08-11 RX ADMIN — SODIUM CHLORIDE 0.9 % (FLUSH) 10 ML: 0.9 % SYRINGE (ML) INJECTION at 13:52:00

## 2017-08-11 NOTE — PROGRESS NOTES
Education Record    Learner:  Patient and Family Member    Disease / Diagnosis: dehydration/breast cancer    Barriers / Limitations:  None   Comments:    Method:  Brief focused and Discussion   Comments:    General Topics:  Plan of care reviewed   Comments

## 2017-08-14 ENCOUNTER — OFFICE VISIT (OUTPATIENT)
Dept: HEMATOLOGY/ONCOLOGY | Facility: HOSPITAL | Age: 72
End: 2017-08-14
Attending: INTERNAL MEDICINE
Payer: MEDICARE

## 2017-08-14 VITALS
WEIGHT: 112.5 LBS | DIASTOLIC BLOOD PRESSURE: 64 MMHG | HEIGHT: 59.96 IN | OXYGEN SATURATION: 95 % | HEART RATE: 96 BPM | SYSTOLIC BLOOD PRESSURE: 109 MMHG | BODY MASS INDEX: 22.09 KG/M2 | TEMPERATURE: 98 F | RESPIRATION RATE: 18 BRPM

## 2017-08-14 DIAGNOSIS — C50.911 BREAST CANCER METASTASIZED TO AXILLARY LYMPH NODE, RIGHT (HCC): Primary | ICD-10-CM

## 2017-08-14 DIAGNOSIS — C77.3 BREAST CANCER METASTASIZED TO AXILLARY LYMPH NODE, RIGHT (HCC): Primary | ICD-10-CM

## 2017-08-14 LAB
BAND %: 4 %
BASOPHIL % MANUAL: 0 %
BASOPHIL ABSOLUTE MANUAL: 0 X10(3) UL (ref 0–0.1)
DOHLE BODIES: PRESENT
EOSINOPHIL % MANUAL: 0 %
EOSINOPHIL ABSOLUTE MANUAL: 0 X10(3) UL (ref 0–0.3)
ERYTHROCYTE [DISTWIDTH] IN BLOOD BY AUTOMATED COUNT: 17.7 % (ref 11.5–16)
HCT VFR BLD AUTO: 33.4 % (ref 34–50)
HGB BLD-MCNC: 11 G/DL (ref 12–16)
LYMPHOCYTE % MANUAL: 31 %
LYMPHOCYTE ABSOLUTE MANUAL: 9.33 X10(3) UL (ref 0.9–4)
MCH RBC QN AUTO: 32.9 PG (ref 27–33.2)
MCHC RBC AUTO-ENTMCNC: 32.9 G/DL (ref 31–37)
MCV RBC AUTO: 100 FL (ref 81–100)
MONOCYTE % MANUAL: 8 %
MONOCYTE ABSOLUTE MANUAL: 2.41 X10(3) UL (ref 0.1–0.6)
MYELOCYTE %: 1 %
MYELOCYTE ABSOLUTE MANUAL: 0.3 X10(3) UL (ref ?–0.01)
NEUTROPHIL ABS PRELIM: 15.36 X10 (3) UL (ref 1.3–6.7)
NEUTROPHIL ABSOLUTE MANUAL: 17.76 X10(3) UL (ref 1.3–6.7)
NEUTROPHILS % MANUAL: 55 %
PLATELET # BLD AUTO: 195 10(3)UL (ref 150–450)
PLATELET MORPHOLOGY: NORMAL
PROMYELOCYTE %: 1 %
PROMYELOCYTE ABSOLUTE MANUAL: 0.3 X10(3) UL (ref ?–0.01)
RBC # BLD AUTO: 3.34 X10(6)UL (ref 3.8–5.1)
RED CELL DISTRIBUTION WIDTH-SD: 64.8 FL (ref 35.1–46.3)
TOTAL CELLS COUNTED: 100
TOXIC GRANULATION: PRESENT
WBC # BLD AUTO: 30.1 X10(3) UL (ref 4–13)

## 2017-08-14 PROCEDURE — 96361 HYDRATE IV INFUSION ADD-ON: CPT

## 2017-08-14 PROCEDURE — 85027 COMPLETE CBC AUTOMATED: CPT

## 2017-08-14 PROCEDURE — 85007 BL SMEAR W/DIFF WBC COUNT: CPT

## 2017-08-14 PROCEDURE — 96360 HYDRATION IV INFUSION INIT: CPT

## 2017-08-14 PROCEDURE — 85025 COMPLETE CBC W/AUTO DIFF WBC: CPT

## 2017-08-14 RX ORDER — SODIUM CHLORIDE 0.9 % (FLUSH) 0.9 %
10 SYRINGE (ML) INJECTION ONCE
Status: COMPLETED | OUTPATIENT
Start: 2017-08-14 | End: 2017-08-14

## 2017-08-14 RX ORDER — SODIUM CHLORIDE 0.9 % (FLUSH) 0.9 %
10 SYRINGE (ML) INJECTION ONCE
Status: CANCELLED | OUTPATIENT
Start: 2017-08-14

## 2017-08-14 RX ORDER — SODIUM CHLORIDE 9 MG/ML
INJECTION, SOLUTION INTRAVENOUS ONCE
Status: CANCELLED
Start: 2017-08-14 | End: 2017-08-14

## 2017-08-14 RX ORDER — SODIUM CHLORIDE 9 MG/ML
INJECTION, SOLUTION INTRAVENOUS ONCE
Status: COMPLETED | OUTPATIENT
Start: 2017-08-14 | End: 2017-08-14

## 2017-08-14 RX ADMIN — SODIUM CHLORIDE: 9 INJECTION, SOLUTION INTRAVENOUS at 11:50:00

## 2017-08-14 RX ADMIN — SODIUM CHLORIDE 0.9 % (FLUSH) 10 ML: 0.9 % SYRINGE (ML) INJECTION at 13:50:00

## 2017-08-14 NOTE — PROGRESS NOTES
Education Record    Learner:  Patient    Disease / Diagnosis:BREAST CANCER AND DEHYDRATION     Barriers / Limitations:  None   Comments:    Method:  Brief focused   Comments:    General Topics:  Medication and Plan of care reviewed   Comments:1 LITER OF 0.

## 2017-08-17 ENCOUNTER — OFFICE VISIT (OUTPATIENT)
Dept: HEMATOLOGY/ONCOLOGY | Facility: HOSPITAL | Age: 72
End: 2017-08-17
Attending: INTERNAL MEDICINE
Payer: MEDICARE

## 2017-08-17 VITALS
SYSTOLIC BLOOD PRESSURE: 97 MMHG | BODY MASS INDEX: 22.28 KG/M2 | DIASTOLIC BLOOD PRESSURE: 65 MMHG | RESPIRATION RATE: 16 BRPM | HEART RATE: 79 BPM | WEIGHT: 113.5 LBS | TEMPERATURE: 98 F | HEIGHT: 59.96 IN | OXYGEN SATURATION: 98 %

## 2017-08-17 DIAGNOSIS — C77.3 BREAST CANCER METASTASIZED TO AXILLARY LYMPH NODE, RIGHT (HCC): Primary | ICD-10-CM

## 2017-08-17 DIAGNOSIS — C50.911 BREAST CANCER METASTASIZED TO AXILLARY LYMPH NODE, RIGHT (HCC): Primary | ICD-10-CM

## 2017-08-17 PROCEDURE — 96361 HYDRATE IV INFUSION ADD-ON: CPT

## 2017-08-17 PROCEDURE — 96360 HYDRATION IV INFUSION INIT: CPT

## 2017-08-17 RX ORDER — SODIUM CHLORIDE 0.9 % (FLUSH) 0.9 %
10 SYRINGE (ML) INJECTION ONCE
Status: CANCELLED | OUTPATIENT
Start: 2017-08-17

## 2017-08-17 RX ORDER — SODIUM CHLORIDE 9 MG/ML
INJECTION, SOLUTION INTRAVENOUS ONCE
Status: COMPLETED | OUTPATIENT
Start: 2017-08-17 | End: 2017-08-17

## 2017-08-17 RX ORDER — SODIUM CHLORIDE 9 MG/ML
INJECTION, SOLUTION INTRAVENOUS ONCE
Status: CANCELLED
Start: 2017-08-17 | End: 2017-08-17

## 2017-08-17 RX ORDER — SODIUM CHLORIDE 0.9 % (FLUSH) 0.9 %
10 SYRINGE (ML) INJECTION ONCE
Status: COMPLETED | OUTPATIENT
Start: 2017-08-17 | End: 2017-08-17

## 2017-08-17 RX ADMIN — SODIUM CHLORIDE 0.9 % (FLUSH) 10 ML: 0.9 % SYRINGE (ML) INJECTION at 13:30:00

## 2017-08-17 RX ADMIN — SODIUM CHLORIDE: 9 INJECTION, SOLUTION INTRAVENOUS at 11:30:00

## 2017-08-17 NOTE — PROGRESS NOTES
Education Record    Learner:  Patient    Disease / Diagnosis: Dehydration    Barriers / Limitations:  None   Comments:    Method:  Brief focused and Reinforcement   Comments:    General Topics:  Plan of care reviewed   Comments:    Outcome:  Shows Yareli

## 2017-08-22 ENCOUNTER — HOSPITAL ENCOUNTER (OUTPATIENT)
Dept: MRI IMAGING | Facility: HOSPITAL | Age: 72
Discharge: HOME | End: 2017-08-22
Attending: INTERNAL MEDICINE
Payer: MEDICARE

## 2017-08-22 DIAGNOSIS — Z17.0 MALIGNANT NEOPLASM OF UPPER-OUTER QUADRANT OF RIGHT BREAST IN FEMALE, ESTROGEN RECEPTOR POSITIVE (HCC): ICD-10-CM

## 2017-08-22 DIAGNOSIS — C50.911 BREAST CANCER METASTASIZED TO AXILLARY LYMPH NODE, RIGHT (HCC): ICD-10-CM

## 2017-08-22 DIAGNOSIS — C77.3 BREAST CANCER METASTASIZED TO AXILLARY LYMPH NODE, RIGHT (HCC): ICD-10-CM

## 2017-08-22 DIAGNOSIS — C50.411 MALIGNANT NEOPLASM OF UPPER-OUTER QUADRANT OF RIGHT BREAST IN FEMALE, ESTROGEN RECEPTOR POSITIVE (HCC): ICD-10-CM

## 2017-08-22 PROCEDURE — 77059 MRI BREAST (W+WO) W/CAD BILAT (CPT=77059/0159T): CPT | Performed by: INTERNAL MEDICINE

## 2017-08-22 PROCEDURE — A9575 INJ GADOTERATE MEGLUMI 0.1ML: HCPCS | Performed by: INTERNAL MEDICINE

## 2017-08-22 PROCEDURE — 0159T MRI BREAST (W+WO) W/CAD BILAT (CPT=77059/0159T): CPT | Performed by: INTERNAL MEDICINE

## 2017-08-25 ENCOUNTER — TELEPHONE (OUTPATIENT)
Dept: HEMATOLOGY/ONCOLOGY | Facility: HOSPITAL | Age: 72
End: 2017-08-25

## 2017-08-25 NOTE — TELEPHONE ENCOUNTER
Phoned patient and discussed breast MRI. Explained that the MRI shows that there was good treatment effect in the breast and axilla.  Explained that we will review the breast MRI at our [address] [place] [address] and assisted patient with moving apts to 8/29

## 2017-08-28 ENCOUNTER — APPOINTMENT (OUTPATIENT)
Dept: HEMATOLOGY/ONCOLOGY | Facility: HOSPITAL | Age: 72
End: 2017-08-28
Attending: INTERNAL MEDICINE
Payer: MEDICARE

## 2017-08-29 ENCOUNTER — OFFICE VISIT (OUTPATIENT)
Dept: HEMATOLOGY/ONCOLOGY | Facility: HOSPITAL | Age: 72
End: 2017-08-29
Attending: INTERNAL MEDICINE
Payer: MEDICARE

## 2017-08-29 ENCOUNTER — TELEPHONE (OUTPATIENT)
Dept: SURGERY | Facility: CLINIC | Age: 72
End: 2017-08-29

## 2017-08-29 ENCOUNTER — RESEARCH ENCOUNTER (OUTPATIENT)
Dept: HEMATOLOGY/ONCOLOGY | Facility: HOSPITAL | Age: 72
End: 2017-08-29

## 2017-08-29 ENCOUNTER — OFFICE VISIT (OUTPATIENT)
Dept: SURGERY | Facility: CLINIC | Age: 72
End: 2017-08-29

## 2017-08-29 VITALS
HEART RATE: 71 BPM | TEMPERATURE: 98 F | HEIGHT: 60.24 IN | DIASTOLIC BLOOD PRESSURE: 66 MMHG | OXYGEN SATURATION: 98 % | RESPIRATION RATE: 16 BRPM | WEIGHT: 116.5 LBS | SYSTOLIC BLOOD PRESSURE: 124 MMHG | BODY MASS INDEX: 22.57 KG/M2

## 2017-08-29 DIAGNOSIS — C50.912 MALIGNANT NEOPLASM OF LEFT FEMALE BREAST, UNSPECIFIED ESTROGEN RECEPTOR STATUS, UNSPECIFIED SITE OF BREAST (HCC): Primary | ICD-10-CM

## 2017-08-29 DIAGNOSIS — C50.911 BREAST CANCER METASTASIZED TO AXILLARY LYMPH NODE, RIGHT (HCC): ICD-10-CM

## 2017-08-29 DIAGNOSIS — C77.3 BREAST CANCER METASTASIZED TO AXILLARY LYMPH NODE, RIGHT (HCC): ICD-10-CM

## 2017-08-29 DIAGNOSIS — C77.3 BREAST CANCER METASTASIZED TO AXILLARY LYMPH NODE (HCC): Primary | ICD-10-CM

## 2017-08-29 DIAGNOSIS — C50.911 CARCINOMA OF RIGHT BREAST METASTATIC TO AXILLARY LYMPH NODE (HCC): Primary | ICD-10-CM

## 2017-08-29 DIAGNOSIS — C50.919 BREAST CANCER METASTASIZED TO AXILLARY LYMPH NODE (HCC): Primary | ICD-10-CM

## 2017-08-29 DIAGNOSIS — C77.3 CARCINOMA OF RIGHT BREAST METASTATIC TO AXILLARY LYMPH NODE (HCC): Primary | ICD-10-CM

## 2017-08-29 LAB
ALBUMIN SERPL-MCNC: 3.4 G/DL (ref 3.5–4.8)
ALP LIVER SERPL-CCNC: 84 U/L (ref 55–142)
ALT SERPL-CCNC: 21 U/L (ref 14–54)
AST SERPL-CCNC: 15 U/L (ref 15–41)
BASOPHILS # BLD AUTO: 0.05 X10(3) UL (ref 0–0.1)
BASOPHILS NFR BLD AUTO: 0.5 %
BILIRUB SERPL-MCNC: 0.4 MG/DL (ref 0.1–2)
BUN BLD-MCNC: 9 MG/DL (ref 8–20)
CALCIUM BLD-MCNC: 8.7 MG/DL (ref 8.3–10.3)
CHLORIDE: 106 MMOL/L (ref 101–111)
CO2: 25 MMOL/L (ref 22–32)
CREAT BLD-MCNC: 0.52 MG/DL (ref 0.55–1.02)
EOSINOPHIL # BLD AUTO: 0.21 X10(3) UL (ref 0–0.3)
EOSINOPHIL NFR BLD AUTO: 2.1 %
ERYTHROCYTE [DISTWIDTH] IN BLOOD BY AUTOMATED COUNT: 18.4 % (ref 11.5–16)
GLUCOSE BLD-MCNC: 88 MG/DL (ref 70–99)
HCT VFR BLD AUTO: 29.1 % (ref 34–50)
HGB BLD-MCNC: 9.6 G/DL (ref 12–16)
IMMATURE GRANULOCYTE COUNT: 0.02 X10(3) UL (ref 0–1)
IMMATURE GRANULOCYTE RATIO %: 0.2 %
LYMPHOCYTES # BLD AUTO: 2.44 X10(3) UL (ref 0.9–4)
LYMPHOCYTES NFR BLD AUTO: 24.8 %
M PROTEIN MFR SERPL ELPH: 6.4 G/DL (ref 6.1–8.3)
MCH RBC QN AUTO: 33.9 PG (ref 27–33.2)
MCHC RBC AUTO-ENTMCNC: 33 G/DL (ref 31–37)
MCV RBC AUTO: 102.8 FL (ref 81–100)
MONOCYTES # BLD AUTO: 1.12 X10(3) UL (ref 0.1–0.6)
MONOCYTES NFR BLD AUTO: 11.4 %
NEUTROPHIL ABS PRELIM: 6.01 X10 (3) UL (ref 1.3–6.7)
NEUTROPHILS # BLD AUTO: 6.01 X10(3) UL (ref 1.3–6.7)
NEUTROPHILS NFR BLD AUTO: 61 %
PLATELET # BLD AUTO: 264 10(3)UL (ref 150–450)
PLATELET MORPHOLOGY: NORMAL
POTASSIUM SERPL-SCNC: 4.1 MMOL/L (ref 3.6–5.1)
RBC # BLD AUTO: 2.83 X10(6)UL (ref 3.8–5.1)
RED CELL DISTRIBUTION WIDTH-SD: 70.4 FL (ref 35.1–46.3)
SODIUM SERPL-SCNC: 137 MMOL/L (ref 136–144)
WBC # BLD AUTO: 9.9 X10(3) UL (ref 4–13)

## 2017-08-29 PROCEDURE — 99214 OFFICE O/P EST MOD 30 MIN: CPT | Performed by: SURGERY

## 2017-08-29 PROCEDURE — 80053 COMPREHEN METABOLIC PANEL: CPT

## 2017-08-29 PROCEDURE — 85025 COMPLETE CBC W/AUTO DIFF WBC: CPT

## 2017-08-29 PROCEDURE — 99215 OFFICE O/P EST HI 40 MIN: CPT | Performed by: INTERNAL MEDICINE

## 2017-08-29 PROCEDURE — 96413 CHEMO IV INFUSION 1 HR: CPT

## 2017-08-29 RX ORDER — SODIUM CHLORIDE 0.9 % (FLUSH) 0.9 %
10 SYRINGE (ML) INJECTION ONCE
Status: CANCELLED | OUTPATIENT
Start: 2017-08-29

## 2017-08-29 RX ORDER — SODIUM CHLORIDE 0.9 % (FLUSH) 0.9 %
10 SYRINGE (ML) INJECTION ONCE
Status: COMPLETED | OUTPATIENT
Start: 2017-08-29 | End: 2017-08-29

## 2017-08-29 RX ORDER — SODIUM CHLORIDE 9 MG/ML
INJECTION, SOLUTION INTRAVENOUS ONCE
Status: CANCELLED
Start: 2017-08-29 | End: 2017-08-29

## 2017-08-29 RX ADMIN — SODIUM CHLORIDE 0.9 % (FLUSH) 10 ML: 0.9 % SYRINGE (ML) INJECTION at 12:45:00

## 2017-08-29 NOTE — PROGRESS NOTES
Cancer Center Progress Note    Problem List:      Patient Active Problem List:     De Quervain's syndrome (tenosynovitis)     Osteopenia     Breast cancer metastasized to axillary lymph node ([facility] [address]     Other specified counseling     Dyspnea     COPD (chroni enlarged, previously on MRI this enlarged lymph node measure at least 1.9 x 1.5 cm. Now in that region there are several nonenlarged lymph nodes including one with a biopsy clip measuring up to 0.4 cm.      Left breast:  No abnormal enhancement is seen to s Base) MCG/ACT Inhalation Aero Soln Inhale into the lungs every 6 (six) hours as needed for Wheezing.  Disp:  Rfl:    Pantoprazole Sodium 40 MG Oral Tab EC Take 1 tablet (40 mg total) by mouth every morning before breakfast. Disp: 30 tablet Rfl: 6   aspirin abdominal cramping  Treatment related nausea    The patient has had six cycles of chemotherapy. I recommended that we proceed on with Herceptin chemotherapy. She will now proceed with surgery with Dr. Rach Gipson.  We discussed the patient's case at the mul

## 2017-08-29 NOTE — PROGRESS NOTES
Education Record    Learner:  Patient    Disease / Diagnosis:BREAST CANCER    Barriers / Limitations:  None   Comments:    Method:  Brief focused and Reinforcement   Comments:    General Topics:  Medication and Plan of care reviewed   Comments:  HERCEPTIN

## 2017-08-29 NOTE — PROGRESS NOTES
STUDY: possible [id number]  STUDY ID#: pending  CYCLE: study intro    Pt presents to [facility] for breast clinic and treatment with herceptin. She is potentially eligible for [id number], comparing ALND to axillary radiation.   Study was introduced to pt and

## 2017-08-30 ENCOUNTER — TELEPHONE (OUTPATIENT)
Dept: SURGERY | Facility: CLINIC | Age: 72
End: 2017-08-30

## 2017-08-30 DIAGNOSIS — C50.911 MALIGNANT NEOPLASM OF RIGHT FEMALE BREAST, UNSPECIFIED ESTROGEN RECEPTOR STATUS, UNSPECIFIED SITE OF BREAST (HCC): Primary | ICD-10-CM

## 2017-08-30 NOTE — PROGRESS NOTES
HPI:    Patient ID: Bhavana Bonner is a 66year old female with known right breast invasive ductal carcinoma that had metastasized to an axillary lymph node. She has since completed 6 cycles of chemotherapy and presents for preoperative evaluation.   She pain. Neck supple. No JVD present. Pulmonary/Chest: She exhibits no mass. Right breast exhibits no inverted nipple, no mass, no nipple discharge, no skin change and no tenderness.  Left breast exhibits no inverted nipple, no mass, no nipple discharge, no infiltrating ductal carcinoma at the 10:00 position no longer shows abnormal enhancement.  There is persistent masslike density seen in that region which likely corresponds to residual fibroglandular tissue as well as   extensive calcifications in that delio axillary lymph node dissection if the sentinel lymph node was positive for metastasis. We then discussed the ALLIANCE trial, which would entail removing a sentinel lymph node as well as the lymph node that had previously been biopsied.   These would be sen

## 2017-08-31 ENCOUNTER — PRIOR ORIGINAL RECORDS (OUTPATIENT)
Dept: OTHER | Age: 72
End: 2017-08-31

## 2017-08-31 ENCOUNTER — RESEARCH ENCOUNTER (OUTPATIENT)
Dept: HEMATOLOGY/ONCOLOGY | Facility: HOSPITAL | Age: 72
End: 2017-08-31

## 2017-08-31 ENCOUNTER — SNF/IP PROF CHARGE ONLY (OUTPATIENT)
Dept: HEMATOLOGY/ONCOLOGY | Facility: HOSPITAL | Age: 72
End: 2017-08-31

## 2017-08-31 DIAGNOSIS — C50.911 CARCINOMA OF RIGHT BREAST METASTATIC TO AXILLARY LYMPH NODE (HCC): ICD-10-CM

## 2017-08-31 DIAGNOSIS — C77.3 CARCINOMA OF RIGHT BREAST METASTATIC TO AXILLARY LYMPH NODE (HCC): ICD-10-CM

## 2017-08-31 PROCEDURE — G9678 ONCOLOGY CARE MODEL SERVICE: HCPCS | Performed by: INTERNAL MEDICINE

## 2017-08-31 NOTE — PROGRESS NOTES
Spoke with patient on the telephone regarding clinical trial [id number]. Patient does not have any questions at this time.  She does state that she thinks she is interested, but that her daughter was out of town until today, so they are going to review juan antonio

## 2017-09-05 ENCOUNTER — RESEARCH ENCOUNTER (OUTPATIENT)
Dept: HEMATOLOGY/ONCOLOGY | Facility: HOSPITAL | Age: 72
End: 2017-09-05

## 2017-09-05 ENCOUNTER — HOSPITAL ENCOUNTER (OUTPATIENT)
Dept: CT IMAGING | Facility: HOSPITAL | Age: 72
Discharge: HOME | End: 2017-09-05
Attending: INTERNAL MEDICINE
Payer: MEDICARE

## 2017-09-05 ENCOUNTER — TELEPHONE (OUTPATIENT)
Dept: HEMATOLOGY/ONCOLOGY | Facility: HOSPITAL | Age: 72
End: 2017-09-05

## 2017-09-05 DIAGNOSIS — R06.00 DYSPNEA ON EXERTION: ICD-10-CM

## 2017-09-05 PROCEDURE — 71275 CT ANGIOGRAPHY CHEST: CPT | Performed by: INTERNAL MEDICINE

## 2017-09-05 NOTE — PROGRESS NOTES
STUDY: [id number]  STUDY ID#: pending  CYCLE: consent note    Met with pt after pre op pulmonology appointment. Pt states Dr. Gabe Adorno ordered CT chest for today. Pt brought signed [id number] main consent, [facility] version #6.   States she and her daughter read it thor

## 2017-09-06 ENCOUNTER — TELEPHONE (OUTPATIENT)
Dept: MAMMOGRAPHY | Facility: HOSPITAL | Age: 72
End: 2017-09-06

## 2017-09-06 NOTE — TELEPHONE ENCOUNTER
Spoke with Erenest Fear regarding [place] Lymph Node mapping which will be done in nuclear medicine department as well as needle localization before partial mastectomy surgery scheduled for 9/8/17 with Dr. Mares.  Procedure explained and all questions

## 2017-09-07 ENCOUNTER — ANESTHESIA EVENT (OUTPATIENT)
Dept: SURGERY | Facility: HOSPITAL | Age: 72
End: 2017-09-07
Payer: MEDICARE

## 2017-09-08 ENCOUNTER — HOSPITAL ENCOUNTER (OUTPATIENT)
Facility: HOSPITAL | Age: 72
Setting detail: HOSPITAL OUTPATIENT SURGERY
Discharge: HOME | End: 2017-09-08
Attending: SURGERY | Admitting: SURGERY
Payer: MEDICARE

## 2017-09-08 ENCOUNTER — ANESTHESIA (OUTPATIENT)
Dept: SURGERY | Facility: HOSPITAL | Age: 72
End: 2017-09-08
Payer: MEDICARE

## 2017-09-08 ENCOUNTER — HOSPITAL ENCOUNTER (OUTPATIENT)
Dept: MAMMOGRAPHY | Facility: HOSPITAL | Age: 72
End: 2017-09-08
Attending: FAMILY MEDICINE
Payer: MEDICARE

## 2017-09-08 ENCOUNTER — HOSPITAL ENCOUNTER (OUTPATIENT)
Dept: NUCLEAR MEDICINE | Facility: HOSPITAL | Age: 72
Discharge: HOME | End: 2017-09-08
Attending: SURGERY
Payer: MEDICARE

## 2017-09-08 ENCOUNTER — RESEARCH ENCOUNTER (OUTPATIENT)
Dept: HEMATOLOGY/ONCOLOGY | Facility: HOSPITAL | Age: 72
End: 2017-09-08

## 2017-09-08 ENCOUNTER — HOSPITAL ENCOUNTER (OUTPATIENT)
Dept: MAMMOGRAPHY | Facility: HOSPITAL | Age: 72
Setting detail: HOSPITAL OUTPATIENT SURGERY
Discharge: HOME | End: 2017-09-08
Attending: SURGERY | Admitting: SURGERY
Payer: MEDICARE

## 2017-09-08 ENCOUNTER — SURGERY (OUTPATIENT)
Age: 72
End: 2017-09-08

## 2017-09-08 VITALS
RESPIRATION RATE: 16 BRPM | HEIGHT: 60 IN | TEMPERATURE: 97 F | BODY MASS INDEX: 22.97 KG/M2 | HEART RATE: 81 BPM | SYSTOLIC BLOOD PRESSURE: 129 MMHG | DIASTOLIC BLOOD PRESSURE: 82 MMHG | OXYGEN SATURATION: 94 % | WEIGHT: 117 LBS

## 2017-09-08 DIAGNOSIS — C50.912 MALIGNANT NEOPLASM OF LEFT FEMALE BREAST, UNSPECIFIED ESTROGEN RECEPTOR STATUS, UNSPECIFIED SITE OF BREAST (HCC): ICD-10-CM

## 2017-09-08 DIAGNOSIS — C50.911 MALIGNANT NEOPLASM OF RIGHT FEMALE BREAST, UNSPECIFIED ESTROGEN RECEPTOR STATUS, UNSPECIFIED SITE OF BREAST (HCC): ICD-10-CM

## 2017-09-08 PROCEDURE — 76098 X-RAY EXAM SURGICAL SPECIMEN: CPT | Performed by: SURGERY

## 2017-09-08 PROCEDURE — 78195 LYMPH SYSTEM IMAGING: CPT | Performed by: SURGERY

## 2017-09-08 PROCEDURE — 19281 PERQ DEVICE BREAST 1ST IMAG: CPT | Performed by: SURGERY

## 2017-09-08 PROCEDURE — BH00ZZZ PLAIN RADIOGRAPHY OF RIGHT BREAST: ICD-10-PCS | Performed by: SURGERY

## 2017-09-08 PROCEDURE — 88305 TISSUE EXAM BY PATHOLOGIST: CPT | Performed by: SURGERY

## 2017-09-08 PROCEDURE — 0HBT0ZZ EXCISION OF RIGHT BREAST, OPEN APPROACH: ICD-10-PCS | Performed by: SURGERY

## 2017-09-08 PROCEDURE — 88331 PATH CONSLTJ SURG 1 BLK 1SPC: CPT | Performed by: SURGERY

## 2017-09-08 PROCEDURE — 19282 PERQ DEVICE BREAST EA IMAG: CPT | Performed by: SURGERY

## 2017-09-08 PROCEDURE — 07B50ZX EXCISION OF RIGHT AXILLARY LYMPHATIC, OPEN APPROACH, DIAGNOSTIC: ICD-10-PCS | Performed by: SURGERY

## 2017-09-08 PROCEDURE — 88307 TISSUE EXAM BY PATHOLOGIST: CPT | Performed by: SURGERY

## 2017-09-08 RX ORDER — DIAZEPAM 5 MG/1
5 TABLET ORAL AS NEEDED
Status: DISCONTINUED | OUTPATIENT
Start: 2017-09-08 | End: 2017-09-08 | Stop reason: HOSPADM

## 2017-09-08 RX ORDER — CEFAZOLIN SODIUM 1 G/3ML
INJECTION, POWDER, FOR SOLUTION INTRAMUSCULAR; INTRAVENOUS
Status: DISCONTINUED | OUTPATIENT
Start: 2017-09-08 | End: 2017-09-08 | Stop reason: HOSPADM

## 2017-09-08 RX ORDER — HYDROCODONE BITARTRATE AND ACETAMINOPHEN 5; 325 MG/1; MG/1
1 TABLET ORAL AS NEEDED
Status: DISCONTINUED | OUTPATIENT
Start: 2017-09-08 | End: 2017-09-08

## 2017-09-08 RX ORDER — BUPIVACAINE HYDROCHLORIDE AND EPINEPHRINE 5; 5 MG/ML; UG/ML
INJECTION, SOLUTION EPIDURAL; INTRACAUDAL; PERINEURAL AS NEEDED
Status: DISCONTINUED | OUTPATIENT
Start: 2017-09-08 | End: 2017-09-08 | Stop reason: HOSPADM

## 2017-09-08 RX ORDER — HYDROMORPHONE HYDROCHLORIDE 1 MG/ML
INJECTION, SOLUTION INTRAMUSCULAR; INTRAVENOUS; SUBCUTANEOUS
Status: COMPLETED
Start: 2017-09-08 | End: 2017-09-08

## 2017-09-08 RX ORDER — ONDANSETRON 2 MG/ML
4 INJECTION INTRAMUSCULAR; INTRAVENOUS AS NEEDED
Status: DISCONTINUED | OUTPATIENT
Start: 2017-09-08 | End: 2017-09-08

## 2017-09-08 RX ORDER — HYDROCODONE BITARTRATE AND ACETAMINOPHEN 5; 325 MG/1; MG/1
2 TABLET ORAL AS NEEDED
Status: DISCONTINUED | OUTPATIENT
Start: 2017-09-08 | End: 2017-09-08

## 2017-09-08 RX ORDER — HYDROMORPHONE HYDROCHLORIDE 1 MG/ML
0.4 INJECTION, SOLUTION INTRAMUSCULAR; INTRAVENOUS; SUBCUTANEOUS EVERY 5 MIN PRN
Status: DISCONTINUED | OUTPATIENT
Start: 2017-09-08 | End: 2017-09-08

## 2017-09-08 RX ORDER — LIDOCAINE HYDROCHLORIDE 10 MG/ML
INJECTION, SOLUTION INFILTRATION; PERINEURAL AS NEEDED
Status: DISCONTINUED | OUTPATIENT
Start: 2017-09-08 | End: 2017-09-08 | Stop reason: HOSPADM

## 2017-09-08 RX ORDER — SODIUM CHLORIDE, SODIUM LACTATE, POTASSIUM CHLORIDE, CALCIUM CHLORIDE 600; 310; 30; 20 MG/100ML; MG/100ML; MG/100ML; MG/100ML
INJECTION, SOLUTION INTRAVENOUS CONTINUOUS
Status: DISCONTINUED | OUTPATIENT
Start: 2017-09-08 | End: 2017-09-08

## 2017-09-08 RX ORDER — HYDROCODONE BITARTRATE AND ACETAMINOPHEN 5; 325 MG/1; MG/1
TABLET ORAL
Qty: 30 TABLET | Refills: 0 | Status: SHIPPED | OUTPATIENT
Start: 2017-09-08 | End: 2017-12-05

## 2017-09-08 RX ORDER — LIDOCAINE AND PRILOCAINE 25; 25 MG/G; MG/G
CREAM TOPICAL ONCE
Status: COMPLETED | OUTPATIENT
Start: 2017-09-08 | End: 2017-09-08

## 2017-09-08 RX ORDER — NALOXONE HYDROCHLORIDE 0.4 MG/ML
80 INJECTION, SOLUTION INTRAMUSCULAR; INTRAVENOUS; SUBCUTANEOUS AS NEEDED
Status: DISCONTINUED | OUTPATIENT
Start: 2017-09-08 | End: 2017-09-08

## 2017-09-08 NOTE — H&P
History & Physical Examination    Patient Name: Kevin Lindsey  MRN: [id number]  [facility]: [id number]  YOB: 1945    Diagnosis: Right breast invasive ductal carcinoma, metastatic to an axillary lymph node    Present Illness: Kevin Lindsey is a 7 Facility-Administered Medications:  lactated ringers infusion  Intravenous Continuous   [MAR Hold] CeFAZolin Sodium (ANCEF/KEFZOL) IVPB premix 2 g 2 g Intravenous Once   [MAR Hold] diazepam (VALIUM) tab 5 mg 5 mg Oral PRN     Facility-Administered Medicati

## 2017-09-08 NOTE — OPERATIVE REPORT
PREOPERATIVE DIAGNOSIS: Right breast invasive ductal carcinoma   POSTOPERATIVE DIAGNOSIS: Right breast invasive ductal carcinoma   PROCEDURE PERFORMED: X-ray localized right breast lumpectomy with attention to surgical margins, injection of blue dye (right to be visualized. Therefore, no wires were placed. She was then brought up to the operating room where general anesthesia was induced. 6 cc of 50% methylene blue was injected into the breast. The breast was then massaged for 5 minutes.  The patient's Kaitlin Shore Therefore, the tissue was grasped with an Allis and excised using cautery. I also felt the inferior medial edge was close. This tissue was also grasped with an Allis and excised using cautery. Cautery was then used to obtain hemostasis.  The wound was ir

## 2017-09-08 NOTE — PROGRESS NOTES
STUDY: [id number]  STUDY ID#: [id number]  CYCLE: surgery    Two sentinel nodes were identified. At 1205 received call from Dr. Liam Melo stating, Sowmya Perez is negative. \"  Pt will not be randomized to study at this time. Dr. Liam Melo to proceed as desired.   If final pa

## 2017-09-08 NOTE — ANESTHESIA POSTPROCEDURE EVALUATION
[facility] Patient Status:  Hospital Outpatient Surgery   Age/Gender 66year old female MRN [id number]   Location [facility] Attending Renay Lima MD   Hosp Day # 0 PCP Amaris Cullen

## 2017-09-08 NOTE — ANESTHESIA PREPROCEDURE EVALUATION
PRE-OP EVALUATION    Patient Name: Gulshan Ma    Pre-op Diagnosis: Malignant neoplasm of RIGHT female breast, unspecified estrogen receptor status, unspecified site of breast ([facility] [address] [C50.912]    Procedure(s):  RIGHT BREAST LUMPECTOMY WITH XRAY Beula Dilling chloride 0.9% IV bolus 1,000 mL 1,000 mL Intravenous Once   0.9%  NaCl infusion  Intravenous Once       Outpatient Medications:    Mometasone Furo-Formoterol Fum (DULERA) 200-5 MCG/ACT Inhalation Aerosol Inhale 2 puffs into the lungs.  Disp:  Juan Jose:    danielle Hg.  Impressions:  This study is compared with previous dated 04/18/2017:  Previous GLS -49.3% and PA systolic pressure have increased from 30-35 mmHg  to 65-70 mmHg. ECG reviewed.   Exercise tolerance: poor   Comment: In April could walk about a mile, n Plan for GETA. Risks/benefits discussed with pt including but not limited to sore throat, nausea/vomiting, dental/oral damage, and cardiac/pulmonary complications. Pt understands risks and wishes to proceed with above plan. All questions answered.    Plan/r

## 2017-09-08 NOTE — IMAGING NOTE
Assisted Dr. Lynda Conteh with 3 site needle localization for breast for lumpectomy. Procedure explained and all questions answered. Pt verbalized understanding. Emotional support provided and pt tolerated procedure well with minimal discomfort.  Gauze dressing pl

## 2017-09-12 ENCOUNTER — OFFICE VISIT (OUTPATIENT)
Dept: HEMATOLOGY/ONCOLOGY | Facility: HOSPITAL | Age: 72
End: 2017-09-12
Attending: INTERNAL MEDICINE
Payer: MEDICARE

## 2017-09-12 ENCOUNTER — OFFICE VISIT (OUTPATIENT)
Dept: SURGERY | Facility: CLINIC | Age: 72
End: 2017-09-12

## 2017-09-12 VITALS
RESPIRATION RATE: 18 BRPM | HEIGHT: 60.24 IN | TEMPERATURE: 97 F | DIASTOLIC BLOOD PRESSURE: 80 MMHG | BODY MASS INDEX: 22.86 KG/M2 | OXYGEN SATURATION: 98 % | SYSTOLIC BLOOD PRESSURE: 123 MMHG | WEIGHT: 118 LBS | HEART RATE: 89 BPM

## 2017-09-12 DIAGNOSIS — C50.911 CARCINOMA OF RIGHT BREAST METASTATIC TO AXILLARY LYMPH NODE (HCC): Primary | ICD-10-CM

## 2017-09-12 DIAGNOSIS — C77.3 BREAST CANCER METASTASIZED TO AXILLARY LYMPH NODE (HCC): ICD-10-CM

## 2017-09-12 DIAGNOSIS — C77.3 CARCINOMA OF RIGHT BREAST METASTATIC TO AXILLARY LYMPH NODE (HCC): Primary | ICD-10-CM

## 2017-09-12 DIAGNOSIS — C50.919 BREAST CANCER METASTASIZED TO AXILLARY LYMPH NODE (HCC): ICD-10-CM

## 2017-09-12 PROCEDURE — 99024 POSTOP FOLLOW-UP VISIT: CPT | Performed by: SURGERY

## 2017-09-12 PROCEDURE — 99211 OFF/OP EST MAY X REQ PHY/QHP: CPT

## 2017-09-12 NOTE — PROGRESS NOTES
HPI:    Patient ID: Tim Whitten is a 66year old female who underwent x-ray localized right breast lumpectomy and sentinel lymph node biopsy on September 8, 2017. She presents today for follow-up.   She took Lobo Melena for her surgical pain and fell off the Eyes: Conjunctivae and EOM are normal. Pupils are equal, round, and reactive to light. No scleral icterus. Neck: Trachea normal and full passive range of motion without pain. Neck supple. No JVD present. Pulmonary/Chest: She exhibits no mass.  Right angelina

## 2017-09-15 NOTE — PROGRESS NOTES
OK to call patient with result. Can you let her know that her margins are clear. I'll have Jeb Calabrese set her up with radiation oncology.   Thanks!!

## 2017-09-19 ENCOUNTER — OFFICE VISIT (OUTPATIENT)
Dept: HEMATOLOGY/ONCOLOGY | Facility: HOSPITAL | Age: 72
End: 2017-09-19
Attending: INTERNAL MEDICINE
Payer: MEDICARE

## 2017-09-19 VITALS
WEIGHT: 117.19 LBS | RESPIRATION RATE: 18 BRPM | HEART RATE: 78 BPM | HEIGHT: 60.24 IN | DIASTOLIC BLOOD PRESSURE: 77 MMHG | OXYGEN SATURATION: 98 % | BODY MASS INDEX: 22.71 KG/M2 | SYSTOLIC BLOOD PRESSURE: 119 MMHG

## 2017-09-19 DIAGNOSIS — C77.3 BREAST CANCER METASTASIZED TO AXILLARY LYMPH NODE, RIGHT (HCC): Primary | ICD-10-CM

## 2017-09-19 DIAGNOSIS — C77.3 CARCINOMA OF RIGHT BREAST METASTATIC TO AXILLARY LYMPH NODE (HCC): Primary | ICD-10-CM

## 2017-09-19 DIAGNOSIS — C50.911 CARCINOMA OF RIGHT BREAST METASTATIC TO AXILLARY LYMPH NODE (HCC): Primary | ICD-10-CM

## 2017-09-19 DIAGNOSIS — C50.911 BREAST CANCER METASTASIZED TO AXILLARY LYMPH NODE, RIGHT (HCC): Primary | ICD-10-CM

## 2017-09-19 LAB
ALBUMIN SERPL-MCNC: 3.3 G/DL (ref 3.5–4.8)
ALP LIVER SERPL-CCNC: 103 U/L (ref 55–142)
ALT SERPL-CCNC: 17 U/L (ref 14–54)
AST SERPL-CCNC: 15 U/L (ref 15–41)
BASOPHILS # BLD AUTO: 0.04 X10(3) UL (ref 0–0.1)
BASOPHILS NFR BLD AUTO: 0.4 %
BILIRUB SERPL-MCNC: 0.3 MG/DL (ref 0.1–2)
BUN BLD-MCNC: 15 MG/DL (ref 8–20)
CALCIUM BLD-MCNC: 8.4 MG/DL (ref 8.3–10.3)
CHLORIDE: 108 MMOL/L (ref 101–111)
CO2: 25 MMOL/L (ref 22–32)
CREAT BLD-MCNC: 0.63 MG/DL (ref 0.55–1.02)
EOSINOPHIL # BLD AUTO: 0.25 X10(3) UL (ref 0–0.3)
EOSINOPHIL NFR BLD AUTO: 2.7 %
ERYTHROCYTE [DISTWIDTH] IN BLOOD BY AUTOMATED COUNT: 14.7 % (ref 11.5–16)
GLUCOSE BLD-MCNC: 98 MG/DL (ref 70–99)
HCT VFR BLD AUTO: 32.8 % (ref 34–50)
HGB BLD-MCNC: 10.5 G/DL (ref 12–16)
IMMATURE GRANULOCYTE COUNT: 0.02 X10(3) UL (ref 0–1)
IMMATURE GRANULOCYTE RATIO %: 0.2 %
LYMPHOCYTES # BLD AUTO: 2.71 X10(3) UL (ref 0.9–4)
LYMPHOCYTES NFR BLD AUTO: 29.6 %
M PROTEIN MFR SERPL ELPH: 6.5 G/DL (ref 6.1–8.3)
MCH RBC QN AUTO: 32.2 PG (ref 27–33.2)
MCHC RBC AUTO-ENTMCNC: 32 G/DL (ref 31–37)
MCV RBC AUTO: 100.6 FL (ref 81–100)
MONOCYTES # BLD AUTO: 0.63 X10(3) UL (ref 0.1–0.6)
MONOCYTES NFR BLD AUTO: 6.9 %
NEUTROPHIL ABS PRELIM: 5.52 X10 (3) UL (ref 1.3–6.7)
NEUTROPHILS # BLD AUTO: 5.52 X10(3) UL (ref 1.3–6.7)
NEUTROPHILS NFR BLD AUTO: 60.2 %
PLATELET # BLD AUTO: 266 10(3)UL (ref 150–450)
POTASSIUM SERPL-SCNC: 4.1 MMOL/L (ref 3.6–5.1)
RBC # BLD AUTO: 3.26 X10(6)UL (ref 3.8–5.1)
RED CELL DISTRIBUTION WIDTH-SD: 54.9 FL (ref 35.1–46.3)
SODIUM SERPL-SCNC: 140 MMOL/L (ref 136–144)
WBC # BLD AUTO: 9.2 X10(3) UL (ref 4–13)

## 2017-09-19 PROCEDURE — 99214 OFFICE O/P EST MOD 30 MIN: CPT | Performed by: NURSE PRACTITIONER

## 2017-09-19 PROCEDURE — 80053 COMPREHEN METABOLIC PANEL: CPT

## 2017-09-19 PROCEDURE — 96413 CHEMO IV INFUSION 1 HR: CPT

## 2017-09-19 PROCEDURE — 85025 COMPLETE CBC W/AUTO DIFF WBC: CPT

## 2017-09-19 RX ORDER — SODIUM CHLORIDE 0.9 % (FLUSH) 0.9 %
10 SYRINGE (ML) INJECTION ONCE
Status: COMPLETED | OUTPATIENT
Start: 2017-09-19 | End: 2017-09-19

## 2017-09-19 RX ORDER — SODIUM CHLORIDE 0.9 % (FLUSH) 0.9 %
10 SYRINGE (ML) INJECTION ONCE
Status: CANCELLED | OUTPATIENT
Start: 2017-09-19

## 2017-09-19 RX ORDER — SODIUM CHLORIDE 9 MG/ML
INJECTION, SOLUTION INTRAVENOUS ONCE
Status: CANCELLED
Start: 2017-09-19 | End: 2017-09-19

## 2017-09-19 RX ADMIN — SODIUM CHLORIDE 0.9 % (FLUSH) 10 ML: 0.9 % SYRINGE (ML) INJECTION at 11:30:00

## 2017-09-19 NOTE — PROGRESS NOTES
CC:Patient presents with: Follow - Up: post lumpectomy resume Herceptin      HPI:   Miguel Angel Trujillo is a(n) 66year old female is a patient followed by Dr. Deon Cortez for breast cancer. She was found to have right sided breast cancer after she presented with a p Oropharynx is clear. Neck: No JVD. No palpable lymphadenopathy. Neck is supple. Chest: Clear to auscultation. Heart: Regular rate and rhythm. Abdomen: Soft, non tender with good bowel sounds. Extremities: Pedal pulses are present. No edema.   Neurolo Lumpectomy  Decker LN x 2 both negative  C. Right breast mass 10 o'clock, wire-localization lumpectomy, post-neoadjuvant therapy:  -No residual invasive carcinoma.  Approximate 3 cm region of fibrosis/hemosiderin.  -Ductal Carcinoma in Situ (DCIS), with fracture will hold off. Pulmonary hypertension, managed by Dr. Ronda Fonseca, medication management.     Patient instructions  Return in 3 weeks for next Herceptin and visit with Dr. Madsen Render consultation with radiation team    Follow up:  Echocardiogram d

## 2017-09-19 NOTE — PROGRESS NOTES
Education Record    Learner:  Patient and Family Member    Disease / Diagnosis:    Barriers / Limitations:  None   Comments:    Method:  Brief focused and Discussion   Comments:    General Topics:  Medication, Side effects and symptom management and Plan o

## 2017-09-27 ENCOUNTER — SOCIAL WORK SERVICES (OUTPATIENT)
Dept: HEMATOLOGY/ONCOLOGY | Facility: HOSPITAL | Age: 72
End: 2017-09-27

## 2017-09-27 ENCOUNTER — RESEARCH ENCOUNTER (OUTPATIENT)
Dept: HEMATOLOGY/ONCOLOGY | Facility: HOSPITAL | Age: 72
End: 2017-09-27

## 2017-09-27 ENCOUNTER — HOSPITAL ENCOUNTER (OUTPATIENT)
Dept: RADIATION ONCOLOGY | Facility: HOSPITAL | Age: 72
Discharge: HOME | End: 2017-09-27
Attending: RADIOLOGY
Payer: MEDICARE

## 2017-09-27 VITALS
BODY MASS INDEX: 23 KG/M2 | DIASTOLIC BLOOD PRESSURE: 70 MMHG | HEART RATE: 72 BPM | WEIGHT: 117.5 LBS | SYSTOLIC BLOOD PRESSURE: 120 MMHG

## 2017-09-27 DIAGNOSIS — C77.3 CARCINOMA OF RIGHT BREAST METASTATIC TO AXILLARY LYMPH NODE (HCC): Primary | ICD-10-CM

## 2017-09-27 DIAGNOSIS — C50.911 CARCINOMA OF RIGHT BREAST METASTATIC TO AXILLARY LYMPH NODE (HCC): Primary | ICD-10-CM

## 2017-09-27 PROCEDURE — 99214 OFFICE O/P EST MOD 30 MIN: CPT

## 2017-09-27 NOTE — PATIENT INSTRUCTIONS
- Renato Lisa as planned    - CT SIMULATION SCAN to be arranged. You should be receiving a call from the staff at [facility] to set this up.     - IF YOU HAVE ANY QUESTIONS OR CONCERNS, PLEASE CALL [phone]

## 2017-09-27 NOTE — PROGRESS NOTES
RENETTA completed patients handicapped form and put it in the second floor locked drawer at [place] [address] in [place] as requested by patients daughter. They will pick it up October 11th.

## 2017-09-27 NOTE — PROGRESS NOTES
STUDY: B-51  STUDY ID#: pending  CYCLE: consent    Research met with pt and daughter after initial RT consult. Explained pt is preliminarily eligible for B-51. Pt states Dr. Jennifer Chase thoroughly explained the study and she wants to participate.   Her daug

## 2017-09-27 NOTE — PROGRESS NOTES
Nursing Consultation Note  Patient: Zonia Carrera  YOB: 1945  Age: 66year old  Radiation Oncologist: Dr. Anup Palomares  Referring Physician: Shailesh Kenney  Diagnosis:No diagnosis found.   Consult Date: 9/27/2017    History of Present I date:  TUBAL LIGATION  Allergies:No Known Allergies     Chemotherapy: yes TCH X 6 P x 1 4/24/17-8/7/17    Previous Radiation: np    Social History  Social History   Marital status:   Spouse name: N/A    Years of education: N/A  Number of children: 6 4-6 hours as needed for pain. Disp: 30 tablet Rfl: 0     Karnofsky's Score: 100%  Review of Systems: Review of Systems   Constitutional: Negative. HENT: Negative. Eyes: Negative. Respiratory: Positive for shortness of breath.          No worse than

## 2017-09-27 NOTE — PROGRESS NOTES
Contacted patient at [phone] to introduce myself as the Oncology Clinical Research RN here at [facility].    Explained to patient that I was informed by Marce Pereyra, Research RN at [address], that she has consented to the research study NSABP-B51, \"A Randomized Phase

## 2017-09-28 NOTE — CONSULTS
[address]    PATIENT'S NAME: Charlene Sullivan   RADIATION ONCOLOGIST: Antonia Carvalho. Jennifer Chase M.D.    PATIENT ACCOUNT #: [de-identified] [facility]ATION[facility]   [facility]   MEDICAL RECORD #: [id number] YOB: 1945   CONSULTATION DATE: 09/27/2017       RADIATI Margins were all negative with the closest margin 3 mm away. Two sentinel lymph nodes were taken, both of which were uninvolved. The patient is, therefore, staged as a ypT0N0. She had been considered for the [place] trial but was no longer eligible. 1. VITAL SIGNS:  Blood pressure 120/70, pulse 72, respiratory rate 18, and she is afebrile. Weight 117 pounds. HEENT:  Pupils are equal, round, reactive to light and accommodation, and the extraocular movements are intact.   The oral cavity is without ul not she requires breast radiation alone versus regional radiation is an unanswered question at this point.   There is a current ongoing NSABP randomized trial to investigate this scenario, and the patient is an excellent candidate for this trial.  She is wi previously dictated. We, therefore, will schedule the patient for simulation at our [facility] and will then begin treatment shortly afterward. She will need to undergo randomization which will be done by our research associates.       Thank deysi

## 2017-09-30 ENCOUNTER — SNF/IP PROF CHARGE ONLY (OUTPATIENT)
Dept: HEMATOLOGY/ONCOLOGY | Facility: HOSPITAL | Age: 72
End: 2017-09-30

## 2017-09-30 DIAGNOSIS — C50.911 CARCINOMA OF RIGHT BREAST METASTATIC TO AXILLARY LYMPH NODE (HCC): ICD-10-CM

## 2017-09-30 DIAGNOSIS — C77.3 CARCINOMA OF RIGHT BREAST METASTATIC TO AXILLARY LYMPH NODE (HCC): ICD-10-CM

## 2017-09-30 PROCEDURE — G9678 ONCOLOGY CARE MODEL SERVICE: HCPCS | Performed by: INTERNAL MEDICINE

## 2017-10-01 ENCOUNTER — APPOINTMENT (OUTPATIENT)
Dept: RADIATION ONCOLOGY | Facility: HOSPITAL | Age: 72
End: 2017-10-01
Attending: RADIOLOGY
Payer: MEDICARE

## 2017-10-04 ENCOUNTER — RESEARCH ENCOUNTER (OUTPATIENT)
Dept: HEMATOLOGY/ONCOLOGY | Facility: HOSPITAL | Age: 72
End: 2017-10-04

## 2017-10-04 PROCEDURE — 77290 THER RAD SIMULAJ FIELD CPLX: CPT | Performed by: RADIOLOGY

## 2017-10-04 PROCEDURE — 77334 RADIATION TREATMENT AID(S): CPT | Performed by: RADIOLOGY

## 2017-10-04 PROCEDURE — 77332 RADIATION TREATMENT AID(S): CPT | Performed by: RADIOLOGY

## 2017-10-04 NOTE — PROGRESS NOTES
Met with patient prior to her CT simulation appointment to complete the B-51 baseline Quality of Life Questionnaire. Patient daughter, Odilia Quach present and supportive. Patient completed the questionnaire without difficulty.     Explained to patient I plan

## 2017-10-04 NOTE — PROGRESS NOTES
Attempted to contact patient-spoke with patients daughter, Sandoval Spencer. Inquired if patient could meet with Research RN at 1320 to complete the baseline questionnaire for the B-51 study she consented to.  Sandoval Spencer explained she will be her mothers transportation to

## 2017-10-05 ENCOUNTER — RESEARCH ENCOUNTER (OUTPATIENT)
Dept: HEMATOLOGY/ONCOLOGY | Facility: HOSPITAL | Age: 72
End: 2017-10-05

## 2017-10-05 NOTE — PROGRESS NOTES
STUDY: B-51/RTOG 1304  STUDY ID: [id number]  RANDOMIZATION    Attempted to contact patient via telephone at 1000- left voice message requesting call back. Received call back from patient at 1056.  Explained to patient that she was registered to the B-

## 2017-10-11 ENCOUNTER — OFFICE VISIT (OUTPATIENT)
Dept: HEMATOLOGY/ONCOLOGY | Facility: HOSPITAL | Age: 72
End: 2017-10-11
Attending: INTERNAL MEDICINE
Payer: MEDICARE

## 2017-10-11 VITALS
OXYGEN SATURATION: 98 % | BODY MASS INDEX: 22.6 KG/M2 | HEIGHT: 60.24 IN | TEMPERATURE: 98 F | HEART RATE: 62 BPM | DIASTOLIC BLOOD PRESSURE: 82 MMHG | SYSTOLIC BLOOD PRESSURE: 113 MMHG | WEIGHT: 116.63 LBS | RESPIRATION RATE: 16 BRPM

## 2017-10-11 DIAGNOSIS — C50.911 CARCINOMA OF RIGHT BREAST METASTATIC TO AXILLARY LYMPH NODE (HCC): Primary | ICD-10-CM

## 2017-10-11 DIAGNOSIS — C77.3 CARCINOMA OF RIGHT BREAST METASTATIC TO AXILLARY LYMPH NODE (HCC): Primary | ICD-10-CM

## 2017-10-11 DIAGNOSIS — Z17.0 MALIGNANT NEOPLASM OF UPPER-OUTER QUADRANT OF RIGHT BREAST IN FEMALE, ESTROGEN RECEPTOR POSITIVE (HCC): ICD-10-CM

## 2017-10-11 DIAGNOSIS — C50.911 BREAST CANCER METASTASIZED TO AXILLARY LYMPH NODE, RIGHT (HCC): ICD-10-CM

## 2017-10-11 DIAGNOSIS — C77.3 BREAST CANCER METASTASIZED TO AXILLARY LYMPH NODE, RIGHT (HCC): ICD-10-CM

## 2017-10-11 DIAGNOSIS — C50.411 MALIGNANT NEOPLASM OF UPPER-OUTER QUADRANT OF RIGHT BREAST IN FEMALE, ESTROGEN RECEPTOR POSITIVE (HCC): ICD-10-CM

## 2017-10-11 PROCEDURE — 80053 COMPREHEN METABOLIC PANEL: CPT

## 2017-10-11 PROCEDURE — 85025 COMPLETE CBC W/AUTO DIFF WBC: CPT

## 2017-10-11 PROCEDURE — 99214 OFFICE O/P EST MOD 30 MIN: CPT | Performed by: INTERNAL MEDICINE

## 2017-10-11 PROCEDURE — 96413 CHEMO IV INFUSION 1 HR: CPT

## 2017-10-11 RX ORDER — LISINOPRIL 10 MG/1
10 TABLET ORAL DAILY
Qty: 30 TABLET | Refills: 3 | Status: SHIPPED | OUTPATIENT
Start: 2017-10-11 | End: 2017-11-22

## 2017-10-11 NOTE — PROGRESS NOTES
Cancer Center Progress Note    Problem List:      Patient Active Problem List:     De Quervain's syndrome (tenosynovitis)     Osteopenia     Breast cancer metastasized to axillary lymph node ([facility] [address]     Other specified counseling     Dyspnea     COPD (chroni breast tissue, negative for tumor.     E. Right breast inferior medial margin, additional:  -Fatty breast tissue, negative for tumor. Review of Systems:   Constitutional: Negative for anorexia, chills, fevers, night sweats and weight loss.   Respirat Patient is alert and not in acute distress. Neck: No palpable lymphadenopathy. Neck is supple. Chest: Clear to auscultation. No rales. No wheezes. Heart: Regular rate and rhythm. Breast: The right breast mass has resolved.  The axillary node is not pa

## 2017-10-16 ENCOUNTER — HOSPITAL ENCOUNTER (OUTPATIENT)
Dept: CV DIAGNOSTICS | Facility: HOSPITAL | Age: 72
Discharge: HOME | End: 2017-10-16
Attending: INTERNAL MEDICINE

## 2017-10-16 ENCOUNTER — MYAURORA ACCOUNT LINK (OUTPATIENT)
Dept: OTHER | Age: 72
End: 2017-10-16

## 2017-10-16 DIAGNOSIS — Z08 ENCOUNTER FOR FOLLOW-UP EXAMINATION AFTER TREATMENT FOR MALIGNANT NEOPLASM: ICD-10-CM

## 2017-10-16 PROCEDURE — 77295 3-D RADIOTHERAPY PLAN: CPT | Performed by: RADIOLOGY

## 2017-10-16 PROCEDURE — 77334 RADIATION TREATMENT AID(S): CPT | Performed by: RADIOLOGY

## 2017-10-16 PROCEDURE — 77300 RADIATION THERAPY DOSE PLAN: CPT | Performed by: RADIOLOGY

## 2017-10-25 PROCEDURE — 77280 THER RAD SIMULAJ FIELD SMPL: CPT | Performed by: RADIOLOGY

## 2017-10-25 PROCEDURE — 77412 RADIATION TX DELIVERY LVL 3: CPT | Performed by: RADIOLOGY

## 2017-10-26 PROCEDURE — 77412 RADIATION TX DELIVERY LVL 3: CPT | Performed by: RADIOLOGY

## 2017-10-26 PROCEDURE — 77387 GUIDANCE FOR RADJ TX DLVR: CPT | Performed by: RADIOLOGY

## 2017-10-27 PROCEDURE — 77412 RADIATION TX DELIVERY LVL 3: CPT | Performed by: RADIOLOGY

## 2017-10-30 PROCEDURE — 77412 RADIATION TX DELIVERY LVL 3: CPT | Performed by: RADIOLOGY

## 2017-10-31 ENCOUNTER — OFFICE VISIT (OUTPATIENT)
Dept: RADIATION ONCOLOGY | Facility: HOSPITAL | Age: 72
End: 2017-10-31
Attending: RADIOLOGY
Payer: MEDICARE

## 2017-10-31 ENCOUNTER — SNF/IP PROF CHARGE ONLY (OUTPATIENT)
Dept: HEMATOLOGY/ONCOLOGY | Facility: HOSPITAL | Age: 72
End: 2017-10-31

## 2017-10-31 VITALS — RESPIRATION RATE: 16 BRPM | SYSTOLIC BLOOD PRESSURE: 112 MMHG | HEART RATE: 85 BPM | DIASTOLIC BLOOD PRESSURE: 85 MMHG

## 2017-10-31 DIAGNOSIS — C50.911 CARCINOMA OF RIGHT BREAST METASTATIC TO AXILLARY LYMPH NODE (HCC): Primary | ICD-10-CM

## 2017-10-31 DIAGNOSIS — C77.3 CARCINOMA OF RIGHT BREAST METASTATIC TO AXILLARY LYMPH NODE (HCC): ICD-10-CM

## 2017-10-31 DIAGNOSIS — C77.3 CARCINOMA OF RIGHT BREAST METASTATIC TO AXILLARY LYMPH NODE (HCC): Primary | ICD-10-CM

## 2017-10-31 DIAGNOSIS — C50.911 CARCINOMA OF RIGHT BREAST METASTATIC TO AXILLARY LYMPH NODE (HCC): ICD-10-CM

## 2017-10-31 PROCEDURE — G9678 ONCOLOGY CARE MODEL SERVICE: HCPCS | Performed by: INTERNAL MEDICINE

## 2017-10-31 PROCEDURE — 77412 RADIATION TX DELIVERY LVL 3: CPT | Performed by: RADIOLOGY

## 2017-10-31 NOTE — PROGRESS NOTES
[facility] Radiation Treatment Management Note 1-5    Patient:  Gulshan Ma  Age:  66year old  Visit Diagnosis:  No diagnosis found.   Primary Rad/Onc:  Dr. Snow Lr    Site Delivered Dose (Gy) Prescribed Dose (Gy) Fraction

## 2017-11-01 ENCOUNTER — APPOINTMENT (OUTPATIENT)
Dept: RADIATION ONCOLOGY | Facility: HOSPITAL | Age: 72
End: 2017-11-01
Attending: RADIOLOGY
Payer: MEDICARE

## 2017-11-01 ENCOUNTER — OFFICE VISIT (OUTPATIENT)
Dept: HEMATOLOGY/ONCOLOGY | Facility: HOSPITAL | Age: 72
End: 2017-11-01
Attending: INTERNAL MEDICINE
Payer: MEDICARE

## 2017-11-01 ENCOUNTER — HOSPITAL ENCOUNTER (OUTPATIENT)
Dept: CT IMAGING | Facility: HOSPITAL | Age: 72
Discharge: HOME | End: 2017-11-01
Attending: CLINICAL NURSE SPECIALIST
Payer: MEDICARE

## 2017-11-01 VITALS
OXYGEN SATURATION: 95 % | BODY MASS INDEX: 21.93 KG/M2 | HEIGHT: 60.24 IN | SYSTOLIC BLOOD PRESSURE: 113 MMHG | HEART RATE: 91 BPM | TEMPERATURE: 99 F | RESPIRATION RATE: 20 BRPM | DIASTOLIC BLOOD PRESSURE: 68 MMHG | WEIGHT: 113.19 LBS

## 2017-11-01 DIAGNOSIS — R14.0 ABDOMINAL BLOATING: ICD-10-CM

## 2017-11-01 DIAGNOSIS — C50.919 CARCINOMA OF BREAST METASTATIC TO AXILLARY LYMPH NODE, UNSPECIFIED LATERALITY (HCC): ICD-10-CM

## 2017-11-01 DIAGNOSIS — R10.32 LEFT LOWER QUADRANT PAIN: ICD-10-CM

## 2017-11-01 DIAGNOSIS — R10.32 LEFT LOWER QUADRANT PAIN: Primary | ICD-10-CM

## 2017-11-01 DIAGNOSIS — C50.911 CARCINOMA OF RIGHT BREAST METASTATIC TO AXILLARY LYMPH NODE (HCC): ICD-10-CM

## 2017-11-01 DIAGNOSIS — C77.3 CARCINOMA OF RIGHT BREAST METASTATIC TO AXILLARY LYMPH NODE (HCC): ICD-10-CM

## 2017-11-01 DIAGNOSIS — C50.911 BREAST CANCER METASTASIZED TO AXILLARY LYMPH NODE, RIGHT (HCC): ICD-10-CM

## 2017-11-01 DIAGNOSIS — C77.3 BREAST CANCER METASTASIZED TO AXILLARY LYMPH NODE, RIGHT (HCC): ICD-10-CM

## 2017-11-01 DIAGNOSIS — C50.911 CARCINOMA OF RIGHT BREAST METASTATIC TO AXILLARY LYMPH NODE (HCC): Primary | ICD-10-CM

## 2017-11-01 DIAGNOSIS — C77.3 CARCINOMA OF RIGHT BREAST METASTATIC TO AXILLARY LYMPH NODE (HCC): Primary | ICD-10-CM

## 2017-11-01 DIAGNOSIS — C77.3 CARCINOMA OF BREAST METASTATIC TO AXILLARY LYMPH NODE, UNSPECIFIED LATERALITY (HCC): ICD-10-CM

## 2017-11-01 PROCEDURE — 77417 THER RADIOLOGY PORT IMAGE(S): CPT | Performed by: RADIOLOGY

## 2017-11-01 PROCEDURE — 74177 CT ABD & PELVIS W/CONTRAST: CPT | Performed by: CLINICAL NURSE SPECIALIST

## 2017-11-01 PROCEDURE — 85025 COMPLETE CBC W/AUTO DIFF WBC: CPT

## 2017-11-01 PROCEDURE — 80053 COMPREHEN METABOLIC PANEL: CPT

## 2017-11-01 PROCEDURE — 99214 OFFICE O/P EST MOD 30 MIN: CPT | Performed by: CLINICAL NURSE SPECIALIST

## 2017-11-01 PROCEDURE — 77412 RADIATION TX DELIVERY LVL 3: CPT | Performed by: RADIOLOGY

## 2017-11-01 PROCEDURE — 82565 ASSAY OF CREATININE: CPT

## 2017-11-01 PROCEDURE — 96413 CHEMO IV INFUSION 1 HR: CPT

## 2017-11-01 RX ORDER — SODIUM CHLORIDE 9 MG/ML
INJECTION, SOLUTION INTRAVENOUS ONCE
Status: CANCELLED
Start: 2017-11-01 | End: 2017-11-01

## 2017-11-01 RX ORDER — SODIUM CHLORIDE 0.9 % (FLUSH) 0.9 %
10 SYRINGE (ML) INJECTION ONCE
Status: CANCELLED | OUTPATIENT
Start: 2017-11-01

## 2017-11-01 RX ORDER — SODIUM CHLORIDE 0.9 % (FLUSH) 0.9 %
10 SYRINGE (ML) INJECTION ONCE
Status: COMPLETED | OUTPATIENT
Start: 2017-11-01 | End: 2017-11-01

## 2017-11-01 RX ADMIN — SODIUM CHLORIDE 0.9 % (FLUSH) 10 ML: 0.9 % SYRINGE (ML) INJECTION at 14:50:00

## 2017-11-01 NOTE — PROGRESS NOTES
ANP Visit Note    Patient Name: Salvatore Delarosa   YOB: 1945   Medical Record Number: [id number]   [facility]: [id number]   Date of visit: 11/1/2017       Chief Complaint/Reason for Visit:  Patient presents with:   Follow - Up: APN assessment  Breast History:  Past Surgical History:  4/2017: BREAST BIOPSY Right  4/2017: IR PORT A CATH PROCEDURE Left  No date: LUMPECTOMY RIGHT  No date: SENT LYMPH NODE BIOPSY  No date: TUBAL LIGATION    Allergies:  No Known Allergies    Family History:  Family History Rfl:   •  Pantoprazole Sodium 40 MG Oral Tab EC, Take 1 tablet (40 mg total) by mouth every morning before breakfast., Disp: 30 tablet, Rfl: 6  •  Atorvastatin Calcium 10 MG Oral Tab, Take 10 mg by mouth nightly., Disp: , Rfl:   •  aspirin 81 MG Oral Chew Absolute 0.71 (H) 0.10 - 0.60 x10(3) uL   Eosinophil Absolute 0.09 0.00 - 0.30 x10(3) uL   Basophil Absolute 0.01 0.00 - 0.10 x10(3) uL   Immature Granulocyte Absolute 0.01 0.00 - 1.00 x10(3) uL   Neutrophil % 63.2 %   Lymphocyte % 22.4 %   Monocyte % 12. 4 No mass or hernia. URINARY BLADDER:  No visible focal wall thickening, lesion, or calculus. PELVIC NODES:  No adenopathy. PELVIC ORGANS:  Pelvic organs appropriate for patient age. Trace free fluid within the right pelvis.     BONES:  No bony les

## 2017-11-01 NOTE — PROGRESS NOTES
Patient here for Herceptin. Comes in with c/o sore throat, chills, nausea, runny nose, and abdominal pain (feels very bloated). Was seen by JORGE Goldsmith. OK to proceed with Herceptin per Sabina Lemus. Patient scheduled for STAT CT Abdomen/Pelvis. Came back after CT.  Ki

## 2017-11-01 NOTE — IMAGING NOTE
Pt in CT for exam with contrast. Blood drawn for ISTAT. Pt reports she is done with treatments and may be deaccessed after CT scan. While instilling heparin, pt reports she is to go back to cancer center and they will deaccess.  Cancer called and spoke with

## 2017-11-02 PROCEDURE — 77412 RADIATION TX DELIVERY LVL 3: CPT | Performed by: RADIOLOGY

## 2017-11-03 PROCEDURE — 77412 RADIATION TX DELIVERY LVL 3: CPT | Performed by: RADIOLOGY

## 2017-11-07 ENCOUNTER — APPOINTMENT (OUTPATIENT)
Dept: RADIATION ONCOLOGY | Facility: HOSPITAL | Age: 72
End: 2017-11-07
Attending: RADIOLOGY
Payer: MEDICARE

## 2017-11-07 PROCEDURE — 77334 RADIATION TREATMENT AID(S): CPT | Performed by: RADIOLOGY

## 2017-11-07 PROCEDURE — 77307 TELETHX ISODOSE PLAN CPLX: CPT | Performed by: RADIOLOGY

## 2017-11-07 PROCEDURE — 77290 THER RAD SIMULAJ FIELD CPLX: CPT | Performed by: RADIOLOGY

## 2017-11-08 ENCOUNTER — OFFICE VISIT (OUTPATIENT)
Dept: RADIATION ONCOLOGY | Facility: HOSPITAL | Age: 72
End: 2017-11-08
Attending: RADIOLOGY
Payer: MEDICARE

## 2017-11-08 VITALS — DIASTOLIC BLOOD PRESSURE: 72 MMHG | RESPIRATION RATE: 18 BRPM | HEART RATE: 72 BPM | SYSTOLIC BLOOD PRESSURE: 112 MMHG

## 2017-11-08 DIAGNOSIS — C50.911 CARCINOMA OF RIGHT BREAST METASTATIC TO AXILLARY LYMPH NODE (HCC): Primary | ICD-10-CM

## 2017-11-08 DIAGNOSIS — C77.3 CARCINOMA OF RIGHT BREAST METASTATIC TO AXILLARY LYMPH NODE (HCC): Primary | ICD-10-CM

## 2017-11-08 PROCEDURE — 77412 RADIATION TX DELIVERY LVL 3: CPT | Performed by: RADIOLOGY

## 2017-11-08 NOTE — PROGRESS NOTES
[facility] Radiation Treatment Management Note 6-10    Patient:  Sara Kaplan  Age:  66year old  Visit Diagnosis:    1.  Carcinoma of right breast metastatic to axillary lymph node [facility])      Primary Rad/Onc:  Dr. Ben Davidson

## 2017-11-09 PROCEDURE — 77412 RADIATION TX DELIVERY LVL 3: CPT | Performed by: RADIOLOGY

## 2017-11-09 PROCEDURE — 77336 RADIATION PHYSICS CONSULT: CPT | Performed by: RADIOLOGY

## 2017-11-10 PROCEDURE — 77417 THER RADIOLOGY PORT IMAGE(S): CPT | Performed by: RADIOLOGY

## 2017-11-10 PROCEDURE — 77412 RADIATION TX DELIVERY LVL 3: CPT | Performed by: RADIOLOGY

## 2017-11-13 PROCEDURE — 77412 RADIATION TX DELIVERY LVL 3: CPT | Performed by: RADIOLOGY

## 2017-11-14 ENCOUNTER — OFFICE VISIT (OUTPATIENT)
Dept: RADIATION ONCOLOGY | Facility: HOSPITAL | Age: 72
End: 2017-11-14
Attending: RADIOLOGY
Payer: MEDICARE

## 2017-11-14 VITALS — DIASTOLIC BLOOD PRESSURE: 84 MMHG | HEART RATE: 82 BPM | SYSTOLIC BLOOD PRESSURE: 130 MMHG | RESPIRATION RATE: 18 BRPM

## 2017-11-14 DIAGNOSIS — C50.911 CARCINOMA OF RIGHT BREAST METASTATIC TO AXILLARY LYMPH NODE (HCC): Primary | ICD-10-CM

## 2017-11-14 DIAGNOSIS — C77.3 CARCINOMA OF RIGHT BREAST METASTATIC TO AXILLARY LYMPH NODE (HCC): Primary | ICD-10-CM

## 2017-11-14 PROCEDURE — 77412 RADIATION TX DELIVERY LVL 3: CPT | Performed by: RADIOLOGY

## 2017-11-14 NOTE — PROGRESS NOTES
[facility] Radiation Treatment Management Note 11-15    Patient:  Salud Van  Age:  74year old  Visit Diagnosis:    1.  Carcinoma of right breast metastatic to axillary lymph node [facility])      Primary Rad/Onc:  Dr. Nixon Scarce

## 2017-11-15 ENCOUNTER — PRIOR ORIGINAL RECORDS (OUTPATIENT)
Dept: OTHER | Age: 72
End: 2017-11-15

## 2017-11-15 PROCEDURE — 77412 RADIATION TX DELIVERY LVL 3: CPT | Performed by: RADIOLOGY

## 2017-11-16 PROCEDURE — 77412 RADIATION TX DELIVERY LVL 3: CPT | Performed by: RADIOLOGY

## 2017-11-17 PROCEDURE — 77417 THER RADIOLOGY PORT IMAGE(S): CPT | Performed by: RADIOLOGY

## 2017-11-17 PROCEDURE — 77336 RADIATION PHYSICS CONSULT: CPT | Performed by: RADIOLOGY

## 2017-11-17 PROCEDURE — 77412 RADIATION TX DELIVERY LVL 3: CPT | Performed by: RADIOLOGY

## 2017-11-19 PROCEDURE — 77412 RADIATION TX DELIVERY LVL 3: CPT | Performed by: RADIOLOGY

## 2017-11-20 PROCEDURE — 77412 RADIATION TX DELIVERY LVL 3: CPT | Performed by: RADIOLOGY

## 2017-11-21 ENCOUNTER — OFFICE VISIT (OUTPATIENT)
Dept: RADIATION ONCOLOGY | Facility: HOSPITAL | Age: 72
End: 2017-11-21
Attending: RADIOLOGY
Payer: MEDICARE

## 2017-11-21 VITALS — SYSTOLIC BLOOD PRESSURE: 121 MMHG | DIASTOLIC BLOOD PRESSURE: 73 MMHG | RESPIRATION RATE: 18 BRPM | HEART RATE: 76 BPM

## 2017-11-21 DIAGNOSIS — C50.911 CARCINOMA OF RIGHT BREAST METASTATIC TO AXILLARY LYMPH NODE (HCC): Primary | ICD-10-CM

## 2017-11-21 DIAGNOSIS — C77.3 CARCINOMA OF RIGHT BREAST METASTATIC TO AXILLARY LYMPH NODE (HCC): Primary | ICD-10-CM

## 2017-11-21 PROCEDURE — 77412 RADIATION TX DELIVERY LVL 3: CPT | Performed by: RADIOLOGY

## 2017-11-21 NOTE — PROGRESS NOTES
[facility] Radiation Treatment Management Note 16-20    Patient:  Jase Hernandez  Age:  74year old  Visit Diagnosis:    1.  Carcinoma of right breast metastatic to axillary lymph node [facility])      Primary Rad/Onc:  Dr. Salud Brooke

## 2017-11-22 ENCOUNTER — OFFICE VISIT (OUTPATIENT)
Dept: HEMATOLOGY/ONCOLOGY | Facility: HOSPITAL | Age: 72
End: 2017-11-22
Attending: INTERNAL MEDICINE
Payer: MEDICARE

## 2017-11-22 VITALS
HEIGHT: 60.24 IN | HEART RATE: 70 BPM | TEMPERATURE: 98 F | SYSTOLIC BLOOD PRESSURE: 109 MMHG | DIASTOLIC BLOOD PRESSURE: 70 MMHG | BODY MASS INDEX: 22.63 KG/M2 | WEIGHT: 116.81 LBS | OXYGEN SATURATION: 98 % | RESPIRATION RATE: 18 BRPM

## 2017-11-22 DIAGNOSIS — C50.911 CARCINOMA OF RIGHT BREAST METASTATIC TO AXILLARY LYMPH NODE (HCC): Primary | ICD-10-CM

## 2017-11-22 DIAGNOSIS — C50.911 BREAST CANCER METASTASIZED TO AXILLARY LYMPH NODE, RIGHT (HCC): Primary | ICD-10-CM

## 2017-11-22 DIAGNOSIS — C77.3 BREAST CANCER METASTASIZED TO AXILLARY LYMPH NODE, RIGHT (HCC): Primary | ICD-10-CM

## 2017-11-22 DIAGNOSIS — C77.3 CARCINOMA OF RIGHT BREAST METASTATIC TO AXILLARY LYMPH NODE (HCC): Primary | ICD-10-CM

## 2017-11-22 PROCEDURE — 96413 CHEMO IV INFUSION 1 HR: CPT

## 2017-11-22 PROCEDURE — 77336 RADIATION PHYSICS CONSULT: CPT | Performed by: RADIOLOGY

## 2017-11-22 PROCEDURE — 77412 RADIATION TX DELIVERY LVL 3: CPT | Performed by: RADIOLOGY

## 2017-11-22 PROCEDURE — 99214 OFFICE O/P EST MOD 30 MIN: CPT | Performed by: INTERNAL MEDICINE

## 2017-11-22 RX ORDER — SODIUM CHLORIDE 0.9 % (FLUSH) 0.9 %
10 SYRINGE (ML) INJECTION ONCE
Status: CANCELLED | OUTPATIENT
Start: 2017-11-22

## 2017-11-22 RX ORDER — CYCLOBENZAPRINE HCL 10 MG
10 TABLET ORAL 3 TIMES DAILY PRN
Qty: 30 TABLET | Refills: 0 | Status: SHIPPED | OUTPATIENT
Start: 2017-11-22 | End: 2017-12-05

## 2017-11-22 RX ORDER — SODIUM CHLORIDE 0.9 % (FLUSH) 0.9 %
10 SYRINGE (ML) INJECTION ONCE
Status: COMPLETED | OUTPATIENT
Start: 2017-11-22 | End: 2017-11-22

## 2017-11-22 RX ORDER — SODIUM CHLORIDE 9 MG/ML
INJECTION, SOLUTION INTRAVENOUS ONCE
Status: CANCELLED
Start: 2017-11-22 | End: 2017-11-22

## 2017-11-22 RX ADMIN — SODIUM CHLORIDE 0.9 % (FLUSH) 10 ML: 0.9 % SYRINGE (ML) INJECTION at 10:55:00

## 2017-11-22 NOTE — PROGRESS NOTES
Cancer Center Progress Note    Problem List:      Patient Active Problem List:     De Quervain's syndrome (tenosynovitis)     Osteopenia     Breast cancer metastasized to axillary lymph node ([facility] [address]     Other specified counseling     Dyspnea     COPD (chroni Right breast inferior medial margin, additional:  -Fatty breast tissue, negative for tumor. Review of Systems:   Constitutional: Negative for anorexia, chills, fevers, night sweats and weight loss.   Respiratory: Negative for cough, hemoptysis, chest and not in acute distress. Neck: No palpable lymphadenopathy. Neck is supple. Chest: Clear to auscultation. No rales. No wheezes. Heart: Regular rate and rhythm. Breast: The right breast mass has resolved. The axillary node is not palpable today.   Ab

## 2017-11-27 PROCEDURE — 77417 THER RADIOLOGY PORT IMAGE(S): CPT | Performed by: RADIOLOGY

## 2017-11-27 PROCEDURE — 77412 RADIATION TX DELIVERY LVL 3: CPT | Performed by: RADIOLOGY

## 2017-11-28 ENCOUNTER — OFFICE VISIT (OUTPATIENT)
Dept: RADIATION ONCOLOGY | Facility: HOSPITAL | Age: 72
End: 2017-11-28
Attending: RADIOLOGY
Payer: MEDICARE

## 2017-11-28 VITALS — DIASTOLIC BLOOD PRESSURE: 78 MMHG | SYSTOLIC BLOOD PRESSURE: 145 MMHG | HEART RATE: 65 BPM | RESPIRATION RATE: 16 BRPM

## 2017-11-28 DIAGNOSIS — G89.29 CHRONIC BACK PAIN: ICD-10-CM

## 2017-11-28 DIAGNOSIS — C50.911 CARCINOMA OF RIGHT BREAST METASTATIC TO AXILLARY LYMPH NODE (HCC): Primary | ICD-10-CM

## 2017-11-28 DIAGNOSIS — C77.3 CARCINOMA OF RIGHT BREAST METASTATIC TO AXILLARY LYMPH NODE (HCC): Primary | ICD-10-CM

## 2017-11-28 DIAGNOSIS — M54.9 CHRONIC BACK PAIN: ICD-10-CM

## 2017-11-28 PROCEDURE — 77412 RADIATION TX DELIVERY LVL 3: CPT | Performed by: RADIOLOGY

## 2017-11-28 NOTE — PROGRESS NOTES
[facility] Radiation Treatment Management Note 21-25    Patient:  Tim Whitten  Age:  74year old  Visit Diagnosis:    1.  Carcinoma of right breast metastatic to axillary lymph node [facility])      Primary Rad/Onc:  Dr. Saintclair Scholz

## 2017-11-29 PROCEDURE — 77412 RADIATION TX DELIVERY LVL 3: CPT | Performed by: RADIOLOGY

## 2017-11-30 ENCOUNTER — SNF/IP PROF CHARGE ONLY (OUTPATIENT)
Dept: HEMATOLOGY/ONCOLOGY | Facility: HOSPITAL | Age: 72
End: 2017-11-30

## 2017-11-30 DIAGNOSIS — C77.3 CARCINOMA OF BREAST METASTATIC TO AXILLARY LYMPH NODE, UNSPECIFIED LATERALITY (HCC): ICD-10-CM

## 2017-11-30 DIAGNOSIS — C50.919 CARCINOMA OF BREAST METASTATIC TO AXILLARY LYMPH NODE, UNSPECIFIED LATERALITY (HCC): ICD-10-CM

## 2017-11-30 PROCEDURE — G9678 ONCOLOGY CARE MODEL SERVICE: HCPCS | Performed by: INTERNAL MEDICINE

## 2017-11-30 PROCEDURE — 77412 RADIATION TX DELIVERY LVL 3: CPT | Performed by: RADIOLOGY

## 2017-12-01 ENCOUNTER — APPOINTMENT (OUTPATIENT)
Dept: RADIATION ONCOLOGY | Facility: HOSPITAL | Age: 72
End: 2017-12-01
Attending: RADIOLOGY
Payer: MEDICARE

## 2017-12-01 PROCEDURE — 77336 RADIATION PHYSICS CONSULT: CPT | Performed by: RADIOLOGY

## 2017-12-01 PROCEDURE — 77412 RADIATION TX DELIVERY LVL 3: CPT | Performed by: RADIOLOGY

## 2017-12-04 PROCEDURE — 77280 THER RAD SIMULAJ FIELD SMPL: CPT | Performed by: RADIOLOGY

## 2017-12-04 PROCEDURE — 77412 RADIATION TX DELIVERY LVL 3: CPT | Performed by: RADIOLOGY

## 2017-12-05 ENCOUNTER — OFFICE VISIT (OUTPATIENT)
Dept: RADIATION ONCOLOGY | Facility: HOSPITAL | Age: 72
End: 2017-12-05
Attending: RADIOLOGY
Payer: MEDICARE

## 2017-12-05 VITALS
DIASTOLIC BLOOD PRESSURE: 98 MMHG | OXYGEN SATURATION: 100 % | TEMPERATURE: 97 F | HEART RATE: 79 BPM | RESPIRATION RATE: 16 BRPM | SYSTOLIC BLOOD PRESSURE: 134 MMHG

## 2017-12-05 PROCEDURE — 77412 RADIATION TX DELIVERY LVL 3: CPT | Performed by: RADIOLOGY

## 2017-12-05 PROCEDURE — 77387 GUIDANCE FOR RADJ TX DLVR: CPT | Performed by: RADIOLOGY

## 2017-12-05 RX ORDER — CELECOXIB 200 MG/1
CAPSULE ORAL
COMMUNITY
Start: 2017-12-01 | End: 2018-04-05

## 2017-12-05 NOTE — PROGRESS NOTES
[facility] Radiation Treatment Management Note 26-30    Patient:  Lenora Santiago  Age:  74year old  Visit Diagnosis:  No diagnosis found.   Primary Rad/Onc:  Dr. Martha Cannon    Site Delivered Dose (Gy) Prescribed Dose (Gy) Refugio Ontiveros

## 2017-12-06 PROCEDURE — 77412 RADIATION TX DELIVERY LVL 3: CPT | Performed by: RADIOLOGY

## 2017-12-06 PROCEDURE — 77387 GUIDANCE FOR RADJ TX DLVR: CPT | Performed by: RADIOLOGY

## 2017-12-07 PROCEDURE — 77387 GUIDANCE FOR RADJ TX DLVR: CPT | Performed by: RADIOLOGY

## 2017-12-07 PROCEDURE — 77412 RADIATION TX DELIVERY LVL 3: CPT | Performed by: RADIOLOGY

## 2017-12-08 PROCEDURE — 77412 RADIATION TX DELIVERY LVL 3: CPT | Performed by: RADIOLOGY

## 2017-12-08 PROCEDURE — 77387 GUIDANCE FOR RADJ TX DLVR: CPT | Performed by: RADIOLOGY

## 2017-12-08 PROCEDURE — 77336 RADIATION PHYSICS CONSULT: CPT | Performed by: RADIOLOGY

## 2017-12-11 ENCOUNTER — OFFICE VISIT (OUTPATIENT)
Dept: HEMATOLOGY/ONCOLOGY | Facility: HOSPITAL | Age: 72
End: 2017-12-11
Attending: INTERNAL MEDICINE
Payer: MEDICARE

## 2017-12-11 ENCOUNTER — RESEARCH ENCOUNTER (OUTPATIENT)
Dept: HEMATOLOGY/ONCOLOGY | Facility: HOSPITAL | Age: 72
End: 2017-12-11

## 2017-12-11 VITALS
SYSTOLIC BLOOD PRESSURE: 119 MMHG | BODY MASS INDEX: 22 KG/M2 | DIASTOLIC BLOOD PRESSURE: 80 MMHG | WEIGHT: 115.38 LBS | HEART RATE: 72 BPM | OXYGEN SATURATION: 97 % | TEMPERATURE: 98 F | RESPIRATION RATE: 18 BRPM

## 2017-12-11 DIAGNOSIS — C50.911 BREAST CANCER METASTASIZED TO AXILLARY LYMPH NODE, RIGHT (HCC): Primary | ICD-10-CM

## 2017-12-11 DIAGNOSIS — C77.3 BREAST CANCER METASTASIZED TO AXILLARY LYMPH NODE, RIGHT (HCC): Primary | ICD-10-CM

## 2017-12-11 PROCEDURE — 77412 RADIATION TX DELIVERY LVL 3: CPT | Performed by: RADIOLOGY

## 2017-12-11 PROCEDURE — 77387 GUIDANCE FOR RADJ TX DLVR: CPT | Performed by: RADIOLOGY

## 2017-12-11 PROCEDURE — 96413 CHEMO IV INFUSION 1 HR: CPT

## 2017-12-11 RX ORDER — SODIUM CHLORIDE 0.9 % (FLUSH) 0.9 %
10 SYRINGE (ML) INJECTION ONCE
Status: COMPLETED | OUTPATIENT
Start: 2017-12-11 | End: 2017-12-11

## 2017-12-11 RX ORDER — SODIUM CHLORIDE 0.9 % (FLUSH) 0.9 %
10 SYRINGE (ML) INJECTION ONCE
Status: CANCELLED | OUTPATIENT
Start: 2017-12-11

## 2017-12-11 RX ORDER — SODIUM CHLORIDE 9 MG/ML
INJECTION, SOLUTION INTRAVENOUS ONCE
Status: CANCELLED
Start: 2017-12-11 | End: 2017-12-11

## 2017-12-11 RX ADMIN — SODIUM CHLORIDE 0.9 % (FLUSH) 10 ML: 0.9 % SYRINGE (ML) INJECTION at 10:15:00

## 2017-12-11 NOTE — PROGRESS NOTES
STUDY: B-51  STUDY ID: [id number]  END OF RT VISIT    Patient seen after completing radiation therapy today for end of treatment visit per B-51 protocol.     Patient is being seen after last radiation treatment today for end of treatment visit as she is goi

## 2017-12-11 NOTE — PROGRESS NOTES
12/11/2017     RADIATION ONCOLOGY COMPLETION SUMMARY NOTE    DIAGNOSIS:  Infiltrating ductal carcinoma of the right breast, grade 3, [id number], ypT0N0, ER negative, MD negative, HER2 positive, s/p adjuvant XRT to the right breast on 12/11/2017.     Dear Mele Martinez THERAPY  Treatment Summary: Course: 1 R Breast    Treatment Site Energy Dose/Fx (Gy) #Fx Dose Correction (Gy) Total Dose (Gy) Start Date End Date Elapsed Days   R Breast Bst 6X 2 6 / 6 0 12 12/4/2017 12/11/2017 7   R Whole Breast 6X 2 25 / 25 0 50 10/25/20

## 2017-12-13 ENCOUNTER — APPOINTMENT (OUTPATIENT)
Dept: HEMATOLOGY/ONCOLOGY | Facility: HOSPITAL | Age: 72
End: 2017-12-13
Attending: INTERNAL MEDICINE
Payer: MEDICARE

## 2017-12-15 PROCEDURE — 77336 RADIATION PHYSICS CONSULT: CPT | Performed by: RADIOLOGY

## 2017-12-28 DIAGNOSIS — C50.911 CARCINOMA OF RIGHT BREAST METASTATIC TO AXILLARY LYMPH NODE (HCC): ICD-10-CM

## 2017-12-28 DIAGNOSIS — Z79.899 ENCOUNTER FOR MONITORING CARDIOTOXIC DRUG THERAPY: Primary | ICD-10-CM

## 2017-12-28 DIAGNOSIS — C77.3 CARCINOMA OF RIGHT BREAST METASTATIC TO AXILLARY LYMPH NODE (HCC): ICD-10-CM

## 2017-12-28 DIAGNOSIS — Z51.81 ENCOUNTER FOR MONITORING CARDIOTOXIC DRUG THERAPY: Primary | ICD-10-CM

## 2017-12-31 ENCOUNTER — SNF/IP PROF CHARGE ONLY (OUTPATIENT)
Dept: HEMATOLOGY/ONCOLOGY | Facility: HOSPITAL | Age: 72
End: 2017-12-31

## 2017-12-31 DIAGNOSIS — C50.919 CARCINOMA OF BREAST METASTATIC TO AXILLARY LYMPH NODE, UNSPECIFIED LATERALITY (HCC): ICD-10-CM

## 2017-12-31 DIAGNOSIS — C77.3 CARCINOMA OF BREAST METASTATIC TO AXILLARY LYMPH NODE, UNSPECIFIED LATERALITY (HCC): ICD-10-CM

## 2017-12-31 PROCEDURE — G9678 ONCOLOGY CARE MODEL SERVICE: HCPCS | Performed by: INTERNAL MEDICINE

## 2018-01-03 ENCOUNTER — HOSPITAL ENCOUNTER (OUTPATIENT)
Dept: CV DIAGNOSTICS | Facility: HOSPITAL | Age: 73
Discharge: HOME | End: 2018-01-03
Attending: INTERNAL MEDICINE
Payer: MEDICARE

## 2018-01-03 ENCOUNTER — OFFICE VISIT (OUTPATIENT)
Dept: HEMATOLOGY/ONCOLOGY | Facility: HOSPITAL | Age: 73
End: 2018-01-03
Attending: INTERNAL MEDICINE
Payer: MEDICARE

## 2018-01-03 VITALS
HEART RATE: 79 BPM | OXYGEN SATURATION: 97 % | WEIGHT: 115 LBS | RESPIRATION RATE: 20 BRPM | DIASTOLIC BLOOD PRESSURE: 64 MMHG | SYSTOLIC BLOOD PRESSURE: 122 MMHG | BODY MASS INDEX: 22 KG/M2 | TEMPERATURE: 98 F

## 2018-01-03 DIAGNOSIS — C77.3 BREAST CANCER METASTASIZED TO AXILLARY LYMPH NODE, RIGHT (HCC): Primary | ICD-10-CM

## 2018-01-03 DIAGNOSIS — C50.911 CARCINOMA OF RIGHT BREAST METASTATIC TO AXILLARY LYMPH NODE (HCC): ICD-10-CM

## 2018-01-03 DIAGNOSIS — Z78.0 ASYMPTOMATIC MENOPAUSE: ICD-10-CM

## 2018-01-03 DIAGNOSIS — C77.3 CARCINOMA OF RIGHT BREAST METASTATIC TO AXILLARY LYMPH NODE (HCC): ICD-10-CM

## 2018-01-03 DIAGNOSIS — Z79.899 ENCOUNTER FOR MONITORING CARDIOTOXIC DRUG THERAPY: ICD-10-CM

## 2018-01-03 DIAGNOSIS — Z51.81 ENCOUNTER FOR MONITORING CARDIOTOXIC DRUG THERAPY: ICD-10-CM

## 2018-01-03 DIAGNOSIS — C50.911 BREAST CANCER METASTASIZED TO AXILLARY LYMPH NODE, RIGHT (HCC): Primary | ICD-10-CM

## 2018-01-03 LAB
ALBUMIN SERPL-MCNC: 3.5 G/DL (ref 3.5–4.8)
ALP LIVER SERPL-CCNC: 127 U/L (ref 55–142)
ALT SERPL-CCNC: 18 U/L (ref 14–54)
AST SERPL-CCNC: 19 U/L (ref 15–41)
BASOPHILS # BLD AUTO: 0.05 X10(3) UL (ref 0–0.1)
BASOPHILS NFR BLD AUTO: 0.7 %
BILIRUB SERPL-MCNC: 0.4 MG/DL (ref 0.1–2)
BUN BLD-MCNC: 12 MG/DL (ref 8–20)
CALCIUM BLD-MCNC: 8.9 MG/DL (ref 8.3–10.3)
CHLORIDE: 108 MMOL/L (ref 101–111)
CO2: 25 MMOL/L (ref 22–32)
CREAT BLD-MCNC: 0.7 MG/DL (ref 0.55–1.02)
EOSINOPHIL # BLD AUTO: 0.16 X10(3) UL (ref 0–0.3)
EOSINOPHIL NFR BLD AUTO: 2.1 %
ERYTHROCYTE [DISTWIDTH] IN BLOOD BY AUTOMATED COUNT: 13.9 % (ref 11.5–16)
GLUCOSE BLD-MCNC: 129 MG/DL (ref 70–99)
HCT VFR BLD AUTO: 36.6 % (ref 34–50)
HGB BLD-MCNC: 12.4 G/DL (ref 12–16)
IMMATURE GRANULOCYTE COUNT: 0.01 X10(3) UL (ref 0–1)
IMMATURE GRANULOCYTE RATIO %: 0.1 %
LYMPHOCYTES # BLD AUTO: 2.42 X10(3) UL (ref 0.9–4)
LYMPHOCYTES NFR BLD AUTO: 32.4 %
M PROTEIN MFR SERPL ELPH: 7.1 G/DL (ref 6.1–8.3)
MCH RBC QN AUTO: 30.3 PG (ref 27–33.2)
MCHC RBC AUTO-ENTMCNC: 33.9 G/DL (ref 31–37)
MCV RBC AUTO: 89.5 FL (ref 81–100)
MONOCYTES # BLD AUTO: 0.45 X10(3) UL (ref 0.1–0.6)
MONOCYTES NFR BLD AUTO: 6 %
NEUTROPHIL ABS PRELIM: 4.39 X10 (3) UL (ref 1.3–6.7)
NEUTROPHILS # BLD AUTO: 4.39 X10(3) UL (ref 1.3–6.7)
NEUTROPHILS NFR BLD AUTO: 58.7 %
PLATELET # BLD AUTO: 285 10(3)UL (ref 150–450)
POTASSIUM SERPL-SCNC: 3.7 MMOL/L (ref 3.6–5.1)
RBC # BLD AUTO: 4.09 X10(6)UL (ref 3.8–5.1)
RED CELL DISTRIBUTION WIDTH-SD: 45.2 FL (ref 35.1–46.3)
SODIUM SERPL-SCNC: 140 MMOL/L (ref 136–144)
WBC # BLD AUTO: 7.5 X10(3) UL (ref 4–13)

## 2018-01-03 PROCEDURE — 96413 CHEMO IV INFUSION 1 HR: CPT

## 2018-01-03 PROCEDURE — 93306 TTE W/DOPPLER COMPLETE: CPT | Performed by: INTERNAL MEDICINE

## 2018-01-03 PROCEDURE — 85025 COMPLETE CBC W/AUTO DIFF WBC: CPT

## 2018-01-03 PROCEDURE — 99214 OFFICE O/P EST MOD 30 MIN: CPT | Performed by: INTERNAL MEDICINE

## 2018-01-03 PROCEDURE — 80053 COMPREHEN METABOLIC PANEL: CPT

## 2018-01-03 RX ORDER — SODIUM CHLORIDE 0.9 % (FLUSH) 0.9 %
10 SYRINGE (ML) INJECTION ONCE
Status: CANCELLED | OUTPATIENT
Start: 2018-01-03

## 2018-01-03 RX ORDER — SODIUM CHLORIDE 0.9 % (FLUSH) 0.9 %
10 SYRINGE (ML) INJECTION ONCE
Status: COMPLETED | OUTPATIENT
Start: 2018-01-03 | End: 2018-01-03

## 2018-01-03 RX ORDER — SODIUM CHLORIDE 9 MG/ML
INJECTION, SOLUTION INTRAVENOUS ONCE
Status: CANCELLED
Start: 2018-01-03 | End: 2018-01-03

## 2018-01-03 RX ORDER — LETROZOLE 2.5 MG/1
2.5 TABLET, FILM COATED ORAL DAILY
Qty: 90 TABLET | Refills: 3 | Status: SHIPPED | OUTPATIENT
Start: 2018-01-03 | End: 2019-02-01

## 2018-01-03 RX ADMIN — SODIUM CHLORIDE 0.9 % (FLUSH) 10 ML: 0.9 % SYRINGE (ML) INJECTION at 14:10:00

## 2018-01-03 NOTE — PROGRESS NOTES
Education Record    Learner:  Patient    Disease / Diagnosis: Breast cancer metastasized to axillary lymph node     Barriers / Limitations:  None   Comments:    Method:  Brief focused and Reinforcement   Comments:    General Topics:  Plan of care reviewed

## 2018-01-03 NOTE — PROGRESS NOTES
Cancer Center Progress Note    Problem List:      Patient Active Problem List:     De Quervain's syndrome (tenosynovitis)     Osteopenia     Breast cancer metastasized to axillary lymph node ([facility] [address]     Other specified counseling     Dyspnea     COPD (chroni medial margin, additional:  -Fatty breast tissue, negative for tumor. Review of Systems:   Constitutional: Negative for anorexia, chills, fevers, night sweats and weight loss.   Respiratory: Negative for cough, hemoptysis, chest pain, or dyspnea on ex sounds. Extremities: No edema. No calf tenderness.       Lab Results  Component Value Date   WBC 5.7 11/01/2017   RBC 3.89 11/01/2017   HGB 11.9 (L) 11/01/2017   HCT 36.0 11/01/2017   MCV 92.5 11/01/2017   MCH 30.6 11/01/2017   MCHC 33.1 11/01/2017   RDW 1

## 2018-01-10 ENCOUNTER — RESEARCH ENCOUNTER (OUTPATIENT)
Dept: HEMATOLOGY/ONCOLOGY | Facility: HOSPITAL | Age: 73
End: 2018-01-10

## 2018-01-10 NOTE — PROGRESS NOTES
STUDY: B51  ID: [id number]  30 day post RT A/E assessment telephone encounter    Contacted patient at [phone] for 30 day adverse event assessment per B-51 protocol. Patient without complaints. Denies fatigue.  Reports resolution of grade 1 radiation dermati

## 2018-01-24 ENCOUNTER — OFFICE VISIT (OUTPATIENT)
Dept: HEMATOLOGY/ONCOLOGY | Facility: HOSPITAL | Age: 73
End: 2018-01-24
Attending: INTERNAL MEDICINE
Payer: MEDICARE

## 2018-01-24 ENCOUNTER — HOSPITAL ENCOUNTER (OUTPATIENT)
Dept: BONE DENSITY | Age: 73
Discharge: HOME | End: 2018-01-24
Attending: INTERNAL MEDICINE
Payer: MEDICARE

## 2018-01-24 VITALS
SYSTOLIC BLOOD PRESSURE: 153 MMHG | RESPIRATION RATE: 18 BRPM | DIASTOLIC BLOOD PRESSURE: 88 MMHG | WEIGHT: 117.81 LBS | HEIGHT: 60.24 IN | HEART RATE: 68 BPM | TEMPERATURE: 99 F | OXYGEN SATURATION: 99 % | BODY MASS INDEX: 22.83 KG/M2

## 2018-01-24 DIAGNOSIS — C77.3 CARCINOMA OF RIGHT BREAST METASTATIC TO AXILLARY LYMPH NODE (HCC): ICD-10-CM

## 2018-01-24 DIAGNOSIS — Z78.0 ASYMPTOMATIC MENOPAUSE: ICD-10-CM

## 2018-01-24 DIAGNOSIS — C50.919 CARCINOMA OF BREAST METASTATIC TO AXILLARY LYMPH NODE, UNSPECIFIED LATERALITY (HCC): Primary | ICD-10-CM

## 2018-01-24 DIAGNOSIS — C77.3 BREAST CANCER METASTASIZED TO AXILLARY LYMPH NODE, RIGHT (HCC): Primary | ICD-10-CM

## 2018-01-24 DIAGNOSIS — C50.911 CARCINOMA OF RIGHT BREAST METASTATIC TO AXILLARY LYMPH NODE (HCC): ICD-10-CM

## 2018-01-24 DIAGNOSIS — C77.3 CARCINOMA OF BREAST METASTATIC TO AXILLARY LYMPH NODE, UNSPECIFIED LATERALITY (HCC): Primary | ICD-10-CM

## 2018-01-24 DIAGNOSIS — C50.911 BREAST CANCER METASTASIZED TO AXILLARY LYMPH NODE, RIGHT (HCC): ICD-10-CM

## 2018-01-24 DIAGNOSIS — B02.29 POST HERPETIC NEURALGIA: ICD-10-CM

## 2018-01-24 DIAGNOSIS — C50.911 BREAST CANCER METASTASIZED TO AXILLARY LYMPH NODE, RIGHT (HCC): Primary | ICD-10-CM

## 2018-01-24 DIAGNOSIS — C77.3 BREAST CANCER METASTASIZED TO AXILLARY LYMPH NODE, RIGHT (HCC): ICD-10-CM

## 2018-01-24 PROCEDURE — 99213 OFFICE O/P EST LOW 20 MIN: CPT | Performed by: CLINICAL NURSE SPECIALIST

## 2018-01-24 PROCEDURE — 96413 CHEMO IV INFUSION 1 HR: CPT

## 2018-01-24 PROCEDURE — 77080 DXA BONE DENSITY AXIAL: CPT | Performed by: INTERNAL MEDICINE

## 2018-01-24 RX ORDER — LIDOCAINE 50 MG/G
1 PATCH TOPICAL EVERY 24 HOURS
Qty: 30 PATCH | Refills: 1 | Status: SHIPPED | OUTPATIENT
Start: 2018-01-24 | End: 2018-09-19

## 2018-01-29 ENCOUNTER — TELEPHONE (OUTPATIENT)
Dept: HEMATOLOGY/ONCOLOGY | Facility: HOSPITAL | Age: 73
End: 2018-01-29

## 2018-01-31 ENCOUNTER — SNF/IP PROF CHARGE ONLY (OUTPATIENT)
Dept: HEMATOLOGY/ONCOLOGY | Facility: HOSPITAL | Age: 73
End: 2018-01-31

## 2018-01-31 DIAGNOSIS — C77.3 CARCINOMA OF BREAST METASTATIC TO AXILLARY LYMPH NODE, UNSPECIFIED LATERALITY (HCC): ICD-10-CM

## 2018-01-31 DIAGNOSIS — C50.919 CARCINOMA OF BREAST METASTATIC TO AXILLARY LYMPH NODE, UNSPECIFIED LATERALITY (HCC): ICD-10-CM

## 2018-01-31 PROCEDURE — G9678 ONCOLOGY CARE MODEL SERVICE: HCPCS | Performed by: INTERNAL MEDICINE

## 2018-02-09 PROBLEM — B02.29 POST HERPETIC NEURALGIA: Status: ACTIVE | Noted: 2018-02-09

## 2018-02-09 NOTE — PROGRESS NOTES
[facility] Progress Note    Patient Name: Emili Dials   YOB: 1945   Medical Record Number: [id number]   CSN: [id number]   Date of visit: 2/9/2018   Provider: JORGE Bradford  Referring Physician: No ref.  provider found    Proble External Cream, Apply to affected area BID PRN for pruritis and rash, Disp: 30 Tube, Rfl: 0  •  Fluticasone Propionate 50 MCG/ACT Nasal Suspension, as needed.   , Disp: , Rfl:   •  Spacer/Aero Chamber Mouthpiece Does not apply Misc, Patient needs spacer to

## 2018-02-14 ENCOUNTER — OFFICE VISIT (OUTPATIENT)
Dept: HEMATOLOGY/ONCOLOGY | Facility: HOSPITAL | Age: 73
End: 2018-02-14
Attending: INTERNAL MEDICINE
Payer: MEDICARE

## 2018-02-14 ENCOUNTER — HOSPITAL ENCOUNTER (OUTPATIENT)
Dept: INTERVENTIONAL RADIOLOGY/VASCULAR | Facility: HOSPITAL | Age: 73
Discharge: HOME | End: 2018-02-14
Attending: INTERNAL MEDICINE | Admitting: INTERNAL MEDICINE
Payer: MEDICARE

## 2018-02-14 VITALS
WEIGHT: 117.81 LBS | SYSTOLIC BLOOD PRESSURE: 123 MMHG | TEMPERATURE: 98 F | HEART RATE: 56 BPM | HEIGHT: 60.24 IN | DIASTOLIC BLOOD PRESSURE: 80 MMHG | RESPIRATION RATE: 18 BRPM | OXYGEN SATURATION: 97 % | BODY MASS INDEX: 22.83 KG/M2

## 2018-02-14 DIAGNOSIS — C77.3 BREAST CANCER METASTASIZED TO AXILLARY LYMPH NODE, RIGHT (HCC): Primary | ICD-10-CM

## 2018-02-14 DIAGNOSIS — C77.3 BREAST CANCER METASTASIZED TO AXILLARY LYMPH NODE, RIGHT (HCC): ICD-10-CM

## 2018-02-14 DIAGNOSIS — C50.911 BREAST CANCER METASTASIZED TO AXILLARY LYMPH NODE, RIGHT (HCC): Primary | ICD-10-CM

## 2018-02-14 DIAGNOSIS — C50.911 BREAST CANCER METASTASIZED TO AXILLARY LYMPH NODE, RIGHT (HCC): ICD-10-CM

## 2018-02-14 PROCEDURE — 3C1ZX8Z IRRIGATION OF INDWELLING DEVICE USING IRRIGATING SUBSTANCE, EXTERNAL APPROACH: ICD-10-PCS | Performed by: RADIOLOGY

## 2018-02-14 PROCEDURE — 96413 CHEMO IV INFUSION 1 HR: CPT

## 2018-02-14 PROCEDURE — 36598 INJ W/FLUOR EVAL CV DEVICE: CPT

## 2018-02-14 NOTE — PROGRESS NOTES
Patient returned from IR, port de-accessed and flushed per IR staff. Site c/d/i. Patient discharged in stable condition.

## 2018-02-14 NOTE — PROGRESS NOTES
Education Record    Learner:  Patient and Family Member    Disease / Diagnosis: Breast CA    Barriers / Limitations:  None   Comments:    Method:  Brief focused   Comments:    General Topics:  Procedure, Side effects and symptom management and Plan of care

## 2018-02-26 ENCOUNTER — TELEPHONE (OUTPATIENT)
Dept: HEMATOLOGY/ONCOLOGY | Facility: HOSPITAL | Age: 73
End: 2018-02-26

## 2018-02-26 NOTE — TELEPHONE ENCOUNTER
Patient phoned complaining of tooth pain and wanted to make sure it was safe for her to have dental work done. Explained to the patient that it was safe to have dental work done while on Herceptin.  Instructed patient to have the dentist office call with an

## 2018-02-28 ENCOUNTER — SNF/IP PROF CHARGE ONLY (OUTPATIENT)
Dept: HEMATOLOGY/ONCOLOGY | Facility: HOSPITAL | Age: 73
End: 2018-02-28

## 2018-02-28 DIAGNOSIS — C50.919 CARCINOMA OF BREAST METASTATIC TO AXILLARY LYMPH NODE, UNSPECIFIED LATERALITY (HCC): ICD-10-CM

## 2018-02-28 DIAGNOSIS — C77.3 CARCINOMA OF BREAST METASTATIC TO AXILLARY LYMPH NODE, UNSPECIFIED LATERALITY (HCC): ICD-10-CM

## 2018-02-28 PROCEDURE — G9678 ONCOLOGY CARE MODEL SERVICE: HCPCS | Performed by: INTERNAL MEDICINE

## 2018-03-05 ENCOUNTER — TELEPHONE (OUTPATIENT)
Dept: HEMATOLOGY/ONCOLOGY | Facility: HOSPITAL | Age: 73
End: 2018-03-05

## 2018-03-05 NOTE — TELEPHONE ENCOUNTER
Patient's daughter states her mother and her are in [place] because patient's son is critical condition. They would lie to cancel appointment for chemo and they will call to reschedule.  [phone] per MD Hima Salmeron

## 2018-03-26 ENCOUNTER — APPOINTMENT (OUTPATIENT)
Dept: HEMATOLOGY/ONCOLOGY | Facility: HOSPITAL | Age: 73
End: 2018-03-26
Attending: INTERNAL MEDICINE
Payer: MEDICARE

## 2018-03-27 ENCOUNTER — TELEPHONE (OUTPATIENT)
Dept: HEMATOLOGY/ONCOLOGY | Facility: HOSPITAL | Age: 73
End: 2018-03-27

## 2018-03-29 ENCOUNTER — APPOINTMENT (OUTPATIENT)
Dept: HEMATOLOGY/ONCOLOGY | Facility: HOSPITAL | Age: 73
End: 2018-03-29
Attending: INTERNAL MEDICINE
Payer: MEDICARE

## 2018-04-05 ENCOUNTER — OFFICE VISIT (OUTPATIENT)
Dept: HEMATOLOGY/ONCOLOGY | Facility: HOSPITAL | Age: 73
End: 2018-04-05
Attending: INTERNAL MEDICINE
Payer: MEDICARE

## 2018-04-05 DIAGNOSIS — C50.919 CARCINOMA OF BREAST METASTATIC TO AXILLARY LYMPH NODE, UNSPECIFIED LATERALITY (HCC): Primary | ICD-10-CM

## 2018-04-05 DIAGNOSIS — C77.3 CARCINOMA OF BREAST METASTATIC TO AXILLARY LYMPH NODE, UNSPECIFIED LATERALITY (HCC): Primary | ICD-10-CM

## 2018-04-05 DIAGNOSIS — C77.3 BREAST CANCER METASTASIZED TO AXILLARY LYMPH NODE, RIGHT (HCC): ICD-10-CM

## 2018-04-05 DIAGNOSIS — M81.6 LOCALIZED OSTEOPOROSIS WITHOUT CURRENT PATHOLOGICAL FRACTURE: ICD-10-CM

## 2018-04-05 DIAGNOSIS — C50.911 BREAST CANCER METASTASIZED TO AXILLARY LYMPH NODE, RIGHT (HCC): ICD-10-CM

## 2018-04-05 PROCEDURE — 85025 COMPLETE CBC W/AUTO DIFF WBC: CPT

## 2018-04-05 PROCEDURE — 99214 OFFICE O/P EST MOD 30 MIN: CPT | Performed by: INTERNAL MEDICINE

## 2018-04-05 PROCEDURE — 36593 DECLOT VASCULAR DEVICE: CPT

## 2018-04-05 PROCEDURE — 96372 THER/PROPH/DIAG INJ SC/IM: CPT

## 2018-04-05 PROCEDURE — 80053 COMPREHEN METABOLIC PANEL: CPT

## 2018-04-05 PROCEDURE — 96374 THER/PROPH/DIAG INJ IV PUSH: CPT

## 2018-04-05 PROCEDURE — 82306 VITAMIN D 25 HYDROXY: CPT

## 2018-04-05 RX ORDER — SODIUM CHLORIDE 9 MG/ML
INJECTION, SOLUTION INTRAVENOUS ONCE
Status: CANCELLED
Start: 2018-04-05 | End: 2018-04-05

## 2018-04-05 RX ORDER — SODIUM CHLORIDE 0.9 % (FLUSH) 0.9 %
10 SYRINGE (ML) INJECTION ONCE
Status: CANCELLED | OUTPATIENT
Start: 2018-04-05

## 2018-04-05 RX ORDER — PREDNISONE 10 MG/1
10 TABLET ORAL DAILY
COMMUNITY
End: 2018-07-05

## 2018-04-05 NOTE — PROGRESS NOTES
Education Record    Learner:  Patient and Family Member    Disease / Diagnosis: Breast Cancer    Barriers / Limitations:  None   Comments:    Method:  Brief focused   Comments:    General Topics:  Side effects and symptom management and Plan of care review

## 2018-04-05 NOTE — PROGRESS NOTES
Cancer Center Progress Note    Problem List:      Patient Active Problem List:     De Quervain's syndrome (tenosynovitis)     Osteopenia     Breast cancer metastasized to axillary lymph node ([facility] [address]     Other specified counseling     Dyspnea     COPD (chroni negative for tumor.     E. Right breast inferior medial margin, additional:  -Fatty breast tissue, negative for tumor. Review of Systems:   Constitutional: Negative for anorexia, chills, fevers, night sweats and weight loss.   Respiratory: Negative f No calf tenderness.       Lab Results  Component Value Date   WBC 7.5 01/03/2018   RBC 4.09 01/03/2018   HGB 12.4 01/03/2018   HCT 36.6 01/03/2018   MCV 89.5 01/03/2018   MCH 30.3 01/03/2018   MCHC 33.9 01/03/2018   RDW 13.9 01/03/2018   .0 01/03/201

## 2018-04-10 ENCOUNTER — TELEPHONE (OUTPATIENT)
Dept: RADIATION ONCOLOGY | Facility: HOSPITAL | Age: 73
End: 2018-04-10

## 2018-04-10 RX ORDER — ERGOCALCIFEROL 1.25 MG/1
50000 CAPSULE ORAL WEEKLY
Qty: 4 CAPSULE | Refills: 6 | Status: SHIPPED | OUTPATIENT
Start: 2018-04-10 | End: 2018-09-22

## 2018-04-10 NOTE — TELEPHONE ENCOUNTER
Called pt and left a message to schedule follow up with Dr Jose Daniel Egan this month. Provided my direct phone number for return call.

## 2018-04-11 ENCOUNTER — TELEPHONE (OUTPATIENT)
Dept: HEMATOLOGY/ONCOLOGY | Facility: HOSPITAL | Age: 73
End: 2018-04-11

## 2018-04-11 NOTE — TELEPHONE ENCOUNTER
Patient was informed that her vitamin D level was very low and Dr Diogo Rodriguez would like her to start weekly vitamin D.  Pt verbalizes understanding

## 2018-04-18 ENCOUNTER — RESEARCH ENCOUNTER (OUTPATIENT)
Dept: HEMATOLOGY/ONCOLOGY | Facility: HOSPITAL | Age: 73
End: 2018-04-18

## 2018-04-18 NOTE — PROGRESS NOTES
STUDY: B-51  PATIENT ID: [id number]  TELEPHONE ENCOUNTER    Contacted patient at 1000 for continued follow up for the B-51 clinical trial. Patient planning to come in to see Dr. Bob Mae this month for follow up.  Patient requesting to be seeing 4/30/18 at

## 2018-05-01 ENCOUNTER — OFFICE VISIT (OUTPATIENT)
Dept: RADIATION ONCOLOGY | Facility: HOSPITAL | Age: 73
End: 2018-05-01
Attending: RADIOLOGY
Payer: MEDICARE

## 2018-05-01 ENCOUNTER — RESEARCH ENCOUNTER (OUTPATIENT)
Dept: HEMATOLOGY/ONCOLOGY | Facility: HOSPITAL | Age: 73
End: 2018-05-01

## 2018-05-01 VITALS
HEART RATE: 75 BPM | RESPIRATION RATE: 18 BRPM | HEIGHT: 60 IN | DIASTOLIC BLOOD PRESSURE: 78 MMHG | BODY MASS INDEX: 23.16 KG/M2 | SYSTOLIC BLOOD PRESSURE: 130 MMHG | OXYGEN SATURATION: 97 % | WEIGHT: 118 LBS | TEMPERATURE: 97 F

## 2018-05-01 DIAGNOSIS — C50.911 CARCINOMA OF RIGHT BREAST METASTATIC TO AXILLARY LYMPH NODE (HCC): Primary | ICD-10-CM

## 2018-05-01 DIAGNOSIS — B02.29 POST HERPETIC NEURALGIA: ICD-10-CM

## 2018-05-01 DIAGNOSIS — C77.3 CARCINOMA OF RIGHT BREAST METASTATIC TO AXILLARY LYMPH NODE (HCC): Primary | ICD-10-CM

## 2018-05-01 PROCEDURE — 99211 OFF/OP EST MAY X REQ PHY/QHP: CPT

## 2018-05-01 RX ORDER — GABAPENTIN 100 MG/1
200 CAPSULE ORAL 3 TIMES DAILY
Qty: 180 CAPSULE | Refills: 1 | Status: SHIPPED | OUTPATIENT
Start: 2018-05-01 | End: 2018-07-05

## 2018-05-01 NOTE — PROGRESS NOTES
STUDY: NSABP-B51  PATIENT ID: [id number]  6 month follow-up    Patient seen in clinic for 6 month follow up. Patient family member present and supportive. Patient tearful after sharing her son recently passed away unexpectedly-offered my condolences.     Pa

## 2018-05-01 NOTE — PATIENT INSTRUCTIONS
Please call [facility] for appointment in October for November follow up with Dr Emil Borges @ [phone].

## 2018-05-01 NOTE — PROGRESS NOTES
Pt seen in 6 month follow up with Dr Calderon Johnson, having completed radiation to the R breast 12/11/17. Seen by research RN, Michael Bonner, for the B51 trial. Pt accompanied by her friend. Recent unexpected loss of her son, much support given.  Developed bronchitis

## 2018-05-02 ENCOUNTER — RESEARCH ENCOUNTER (OUTPATIENT)
Dept: HEMATOLOGY/ONCOLOGY | Facility: HOSPITAL | Age: 73
End: 2018-05-02

## 2018-05-02 NOTE — PROGRESS NOTES
[facility]    PATIENT'S NAME: Paul Campbell   RADIATION ONCOLOGIST: Fatoumata Wick.  Torres Coats MD   PATIENT ACCOUNT #: [de-identified] LOCATION: [address] [place] [address] RECORD #: [id number] YOB: 1945   FOLLOW-UP DATE: 05/01/2018       RADIAT to Radiation Oncology. She was also placed on the B-51 trial which randomized whole breast irradiation versus regional irradiation and was randomized to the whole breast treatment arm.   I then treated the breast to 5000 cGy in 200 cGy fractions followed b irritation at this point from the radiation. The contralateral breast is also clinically negative. IMPRESSION AND RECOMMENDATIONS:  Overall, the patient is doing reasonably well. She has recovered from her surgery and radiation.   She had been told to

## 2018-05-03 ENCOUNTER — TELEPHONE (OUTPATIENT)
Dept: RADIATION ONCOLOGY | Facility: HOSPITAL | Age: 73
End: 2018-05-03

## 2018-05-03 NOTE — PROGRESS NOTES
STUDY: NSABP-B51  PATIENT ID: [id number]    Received outside records from [facility] from patients admission 3/25/18-4/1/18. Discharge note to be sent to medical records.

## 2018-05-03 NOTE — TELEPHONE ENCOUNTER
Gisele Turk would like to speak to you at [phone] states gabapentin you prescribed is causing extreme dizziness. Dr. Carolene Peabody emailed.

## 2018-06-21 RX ORDER — PANTOPRAZOLE SODIUM 40 MG/1
TABLET, DELAYED RELEASE ORAL
Qty: 30 TABLET | Refills: 11 | Status: SHIPPED | OUTPATIENT
Start: 2018-06-21 | End: 2019-06-02

## 2018-07-05 ENCOUNTER — HOSPITAL ENCOUNTER (OUTPATIENT)
Dept: LAB | Facility: HOSPITAL | Age: 73
Discharge: HOME | End: 2018-07-05
Attending: INTERNAL MEDICINE
Payer: MEDICARE

## 2018-07-05 ENCOUNTER — PRIOR ORIGINAL RECORDS (OUTPATIENT)
Dept: OTHER | Age: 73
End: 2018-07-05

## 2018-07-05 ENCOUNTER — OFFICE VISIT (OUTPATIENT)
Dept: HEMATOLOGY/ONCOLOGY | Facility: HOSPITAL | Age: 73
End: 2018-07-05
Attending: INTERNAL MEDICINE
Payer: MEDICARE

## 2018-07-05 VITALS
HEIGHT: 60 IN | RESPIRATION RATE: 18 BRPM | HEART RATE: 72 BPM | DIASTOLIC BLOOD PRESSURE: 82 MMHG | TEMPERATURE: 97 F | SYSTOLIC BLOOD PRESSURE: 119 MMHG | BODY MASS INDEX: 23.01 KG/M2 | WEIGHT: 117.19 LBS | OXYGEN SATURATION: 97 %

## 2018-07-05 DIAGNOSIS — C50.919 CARCINOMA OF BREAST METASTATIC TO AXILLARY LYMPH NODE, UNSPECIFIED LATERALITY (HCC): Primary | ICD-10-CM

## 2018-07-05 DIAGNOSIS — C77.3 CARCINOMA OF BREAST METASTATIC TO AXILLARY LYMPH NODE, UNSPECIFIED LATERALITY (HCC): Primary | ICD-10-CM

## 2018-07-05 DIAGNOSIS — G89.29 CHRONIC LEFT-SIDED LOW BACK PAIN WITHOUT SCIATICA: ICD-10-CM

## 2018-07-05 DIAGNOSIS — M54.50 CHRONIC LEFT-SIDED LOW BACK PAIN WITHOUT SCIATICA: ICD-10-CM

## 2018-07-05 LAB
PRO-BETA NATRIURETIC PEPTIDE: 123 PG/ML (ref ?–125)
TROPONIN: <0.046 NG/ML (ref ?–0.05)

## 2018-07-05 PROCEDURE — 83880 ASSAY OF NATRIURETIC PEPTIDE: CPT | Performed by: INTERNAL MEDICINE

## 2018-07-05 PROCEDURE — 99214 OFFICE O/P EST MOD 30 MIN: CPT | Performed by: INTERNAL MEDICINE

## 2018-07-05 PROCEDURE — 36415 COLL VENOUS BLD VENIPUNCTURE: CPT | Performed by: INTERNAL MEDICINE

## 2018-07-05 PROCEDURE — 84484 ASSAY OF TROPONIN QUANT: CPT | Performed by: INTERNAL MEDICINE

## 2018-07-05 NOTE — PROGRESS NOTES
Cancer Center Progress Note    Problem List:      Patient Active Problem List:     De Quervain's syndrome (tenosynovitis)     Osteopenia     Breast cancer metastasized to axillary lymph node ([facility] [address]     Other specified counseling     Dyspnea     COPD (chroni additional:  -Fatty breast tissue, negative for tumor.     E. Right breast inferior medial margin, additional:  -Fatty breast tissue, negative for tumor.        Review of Systems:   Constitutional: Negative for anorexia, chills, fevers, night sweats and we Fully active, able to carry on all pre-disease performance without restriction     Physical Examination:    General: Patient is alert and not in acute distress. Neck: No palpable lymphadenopathy. Neck is supple. Chest: Clear to auscultation. No rales.   Lisa Gutierrez

## 2018-07-05 NOTE — PROGRESS NOTES
Outpatient Oncology Care Plan  Problem list:  knowledge deficit    Problems related to:    disease/disease progression    Interventions:  provided general teaching    Expected outcomes:  symptoms relieved/minimized    Progress towards outcome:  making prog

## 2018-07-09 ENCOUNTER — APPOINTMENT (OUTPATIENT)
Dept: HEMATOLOGY/ONCOLOGY | Facility: HOSPITAL | Age: 73
End: 2018-07-09
Attending: INTERNAL MEDICINE
Payer: MEDICARE

## 2018-07-11 ENCOUNTER — PRIOR ORIGINAL RECORDS (OUTPATIENT)
Dept: OTHER | Age: 73
End: 2018-07-11

## 2018-07-12 ENCOUNTER — PRIOR ORIGINAL RECORDS (OUTPATIENT)
Dept: OTHER | Age: 73
End: 2018-07-12

## 2018-07-19 ENCOUNTER — HOSPITAL ENCOUNTER (OUTPATIENT)
Dept: MRI IMAGING | Facility: HOSPITAL | Age: 73
Discharge: HOME | End: 2018-07-19
Attending: INTERNAL MEDICINE
Payer: MEDICARE

## 2018-07-19 DIAGNOSIS — G89.29 CHRONIC LEFT-SIDED LOW BACK PAIN WITHOUT SCIATICA: ICD-10-CM

## 2018-07-19 DIAGNOSIS — M54.50 CHRONIC LEFT-SIDED LOW BACK PAIN WITHOUT SCIATICA: ICD-10-CM

## 2018-07-19 DIAGNOSIS — C77.3 CARCINOMA OF BREAST METASTATIC TO AXILLARY LYMPH NODE, UNSPECIFIED LATERALITY (HCC): ICD-10-CM

## 2018-07-19 DIAGNOSIS — C50.919 CARCINOMA OF BREAST METASTATIC TO AXILLARY LYMPH NODE, UNSPECIFIED LATERALITY (HCC): ICD-10-CM

## 2018-07-19 PROCEDURE — A9576 INJ PROHANCE MULTIPACK: HCPCS | Performed by: INTERNAL MEDICINE

## 2018-07-19 PROCEDURE — 72158 MRI LUMBAR SPINE W/O & W/DYE: CPT | Performed by: INTERNAL MEDICINE

## 2018-07-23 ENCOUNTER — TELEPHONE (OUTPATIENT)
Dept: PAIN CLINIC | Facility: CLINIC | Age: 73
End: 2018-07-23

## 2018-07-23 ENCOUNTER — OFFICE VISIT (OUTPATIENT)
Dept: PAIN CLINIC | Facility: CLINIC | Age: 73
End: 2018-07-23
Payer: MEDICARE

## 2018-07-23 VITALS
WEIGHT: 118 LBS | HEIGHT: 61 IN | DIASTOLIC BLOOD PRESSURE: 70 MMHG | BODY MASS INDEX: 22.28 KG/M2 | HEART RATE: 72 BPM | SYSTOLIC BLOOD PRESSURE: 106 MMHG | OXYGEN SATURATION: 96 %

## 2018-07-23 DIAGNOSIS — M48.061 SPINAL STENOSIS OF LUMBAR REGION, UNSPECIFIED WHETHER NEUROGENIC CLAUDICATION PRESENT: ICD-10-CM

## 2018-07-23 DIAGNOSIS — M54.16 LUMBAR RADICULOPATHY: Primary | ICD-10-CM

## 2018-07-23 DIAGNOSIS — M47.816 LUMBAR FACET ARTHROPATHY: ICD-10-CM

## 2018-07-23 DIAGNOSIS — M47.816 LUMBAR FACET ARTHROPATHY: Primary | ICD-10-CM

## 2018-07-23 PROCEDURE — 99204 OFFICE O/P NEW MOD 45 MIN: CPT | Performed by: ANESTHESIOLOGY

## 2018-07-23 NOTE — PROGRESS NOTES
HPI:    Patient ID: Pat Read is a 74year old female.     HPI    Review of Systems         Current Outpatient Prescriptions:  PANTOPRAZOLE SODIUM 40 MG Oral Tab EC TAKE 1 TABLET(40 MG) BY MOUTH EVERY MORNING BEFORE BREAKFAST Disp: 30 tablet Rfl: 11 No prescriptions requested or ordered in this encounter    Imaging & Referrals:  None       MA#[id number]    New patient here today c/o Low back, left side, intermittent radiating pain into left leg.  Current pain = 2/10 upon sitting down, <2 min later = 5/10

## 2018-07-23 NOTE — PATIENT INSTRUCTIONS
Refill policies:    • Allow 2-3 business days for refills; controlled substances may take longer.   • Contact your pharmacy at least 5 days prior to running out of medication and have them send an electronic request or submit request through the “request re entire amount billed. Precertification and Prior Authorizations: If your physician has recommended that you have a procedure or additional testing performed.   [facility]) will contact your insurance carrier to obtain pre-certi procedure if you are experiencing any symptoms of infection such as cough, fever, chills, urinary symptoms, or have recently been prescribed antibiotics, have open wounds, have recently had surgery or dental procedures.      As you will be unable to shower days  • Eliquis (Apixaban) 3 days  • Xarelto (Rivaroxaban) 3 days  • Lovenox (Enoxaparin) 24 hours  • Aspirin  • 81mg 24 hours  • Greater than 81 mg (325mg) 7 days  • Coumadin       5 days  • Procedure may be cancelled if INR is elevated.    • Excedrin (wit CANCELLATION AND/OR RESCHEDULING: PLEASE CALL [facility] PRE-PROCEDURE LINE [phone] FOR DETAILED INSTRUCTIONS FIVE TO SEVEN DAYS PRIOR TO PROCEDURE**

## 2018-07-26 ENCOUNTER — HOSPITAL ENCOUNTER (OUTPATIENT)
Dept: MAMMOGRAPHY | Facility: HOSPITAL | Age: 73
Discharge: HOME | End: 2018-07-26
Attending: INTERNAL MEDICINE
Payer: MEDICARE

## 2018-07-26 ENCOUNTER — TELEPHONE (OUTPATIENT)
Dept: SURGERY | Facility: CLINIC | Age: 73
End: 2018-07-26

## 2018-07-26 DIAGNOSIS — C77.3 CARCINOMA OF BREAST METASTATIC TO AXILLARY LYMPH NODE, UNSPECIFIED LATERALITY (HCC): ICD-10-CM

## 2018-07-26 DIAGNOSIS — C50.919 CARCINOMA OF BREAST METASTATIC TO AXILLARY LYMPH NODE, UNSPECIFIED LATERALITY (HCC): ICD-10-CM

## 2018-07-26 PROCEDURE — 77062 BREAST TOMOSYNTHESIS BI: CPT | Performed by: INTERNAL MEDICINE

## 2018-07-26 PROCEDURE — 77066 DX MAMMO INCL CAD BI: CPT | Performed by: INTERNAL MEDICINE

## 2018-07-26 NOTE — TELEPHONE ENCOUNTER
Spoke to patient, confirmed procedure date of 7/31/18 at 10:15 am to be checked in at outpatient registration.  Patient called pre-procedure line and understood instructions Patient instructed to call office if there are additional questions after listening

## 2018-07-28 NOTE — PROGRESS NOTES
Name: Sara Kaplan   : 1945   DOS: 2018     Chief complaint: Low back pain    History of present illness:  Sara Kaplan is a 74year old female complaining of  pain in the lower back for many years but since 2018 her pain got si Rfl: 3   Mometasone Furo-Formoterol Fum (DULERA) 200-5 MCG/ACT Inhalation Aerosol Inhale 2 puffs into the lungs. Disp:  Rfl:    metoprolol succinate 12.5 mg Oral Tab Take 12.5 mg by mouth Daily Beta Blocker.  Disp:  Rfl:    Fluticasone Propionate 0.05 % Ext denies thyroid disorder, diabetes mellitus, osteoporosis or any other endocrine problems. Respiratory:  Denies shortness of breath, wheezing, coughing.   Gastrointestinal:  Denies abdominal pain, nausea, vomiting, constipation, diarrhea, symptoms of GI bl Reflexes:            Biceps:     5   5   Triceps:    5   5   Brachioradialis:               5   5  Sensory Examination:    R   L   C5:     N   N   C6:     N   N   C7:     N   N   C8:     N   N   T1:     N   N    Cardiovascular system: Regular rate and rhyt N    Radiology diagnostic studies: MRI of lumbar spine was reviewed. MRI of lumbar spine showed multilevel degenerative disc disease, facet hypertrophy and foraminal narrowing.     Assessment:  Lumbar facet arthropathy  Sacroiliitis  Lumbar radiculiti

## 2018-07-31 ENCOUNTER — HOSPITAL ENCOUNTER (OUTPATIENT)
Facility: HOSPITAL | Age: 73
Setting detail: HOSPITAL OUTPATIENT SURGERY
Discharge: HOME | End: 2018-07-31
Attending: ANESTHESIOLOGY | Admitting: ANESTHESIOLOGY
Payer: MEDICARE

## 2018-07-31 ENCOUNTER — SURGERY (OUTPATIENT)
Age: 73
End: 2018-07-31

## 2018-07-31 ENCOUNTER — APPOINTMENT (OUTPATIENT)
Dept: GENERAL RADIOLOGY | Facility: HOSPITAL | Age: 73
End: 2018-07-31
Attending: ANESTHESIOLOGY
Payer: MEDICARE

## 2018-07-31 VITALS
RESPIRATION RATE: 18 BRPM | DIASTOLIC BLOOD PRESSURE: 71 MMHG | TEMPERATURE: 99 F | HEART RATE: 73 BPM | OXYGEN SATURATION: 98 % | SYSTOLIC BLOOD PRESSURE: 121 MMHG

## 2018-07-31 DIAGNOSIS — M48.061 SPINAL STENOSIS OF LUMBAR REGION, UNSPECIFIED WHETHER NEUROGENIC CLAUDICATION PRESENT: ICD-10-CM

## 2018-07-31 PROCEDURE — 3E0U33Z INTRODUCTION OF ANTI-INFLAMMATORY INTO JOINTS, PERCUTANEOUS APPROACH: ICD-10-PCS | Performed by: ANESTHESIOLOGY

## 2018-07-31 PROCEDURE — 99152 MOD SED SAME PHYS/QHP 5/>YRS: CPT | Performed by: ANESTHESIOLOGY

## 2018-07-31 PROCEDURE — 3E0U3BZ INTRODUCTION OF ANESTHETIC AGENT INTO JOINTS, PERCUTANEOUS APPROACH: ICD-10-PCS | Performed by: ANESTHESIOLOGY

## 2018-07-31 PROCEDURE — BR161ZZ FLUOROSCOPY OF LUMBAR FACET JOINT(S) USING LOW OSMOLAR CONTRAST: ICD-10-PCS | Performed by: ANESTHESIOLOGY

## 2018-07-31 RX ORDER — LIDOCAINE HYDROCHLORIDE 10 MG/ML
INJECTION, SOLUTION EPIDURAL; INFILTRATION; INTRACAUDAL; PERINEURAL AS NEEDED
Status: DISCONTINUED | OUTPATIENT
Start: 2018-07-31 | End: 2018-07-31 | Stop reason: HOSPADM

## 2018-07-31 RX ORDER — DIPHENHYDRAMINE HYDROCHLORIDE 50 MG/ML
50 INJECTION INTRAMUSCULAR; INTRAVENOUS ONCE AS NEEDED
Status: DISCONTINUED | OUTPATIENT
Start: 2018-07-31 | End: 2018-07-31

## 2018-07-31 RX ORDER — SODIUM CHLORIDE, SODIUM LACTATE, POTASSIUM CHLORIDE, CALCIUM CHLORIDE 600; 310; 30; 20 MG/100ML; MG/100ML; MG/100ML; MG/100ML
100 INJECTION, SOLUTION INTRAVENOUS CONTINUOUS
Status: DISCONTINUED | OUTPATIENT
Start: 2018-07-31 | End: 2018-07-31

## 2018-07-31 RX ORDER — METHYLPREDNISOLONE ACETATE 40 MG/ML
INJECTION, SUSPENSION INTRA-ARTICULAR; INTRALESIONAL; INTRAMUSCULAR; SOFT TISSUE AS NEEDED
Status: DISCONTINUED | OUTPATIENT
Start: 2018-07-31 | End: 2018-07-31 | Stop reason: HOSPADM

## 2018-07-31 RX ORDER — MIDAZOLAM HYDROCHLORIDE 1 MG/ML
INJECTION INTRAMUSCULAR; INTRAVENOUS AS NEEDED
Status: DISCONTINUED | OUTPATIENT
Start: 2018-07-31 | End: 2018-07-31 | Stop reason: HOSPADM

## 2018-07-31 RX ORDER — ONDANSETRON 2 MG/ML
4 INJECTION INTRAMUSCULAR; INTRAVENOUS ONCE AS NEEDED
Status: DISCONTINUED | OUTPATIENT
Start: 2018-07-31 | End: 2018-07-31

## 2018-07-31 NOTE — OPERATIVE REPORT
[facility]  Operative Report  2018     Salvatore Reusing Patient Status:  Hospital Outpatient Surgery    1945 MRN [id number]   Location [address] [facility] Attending Oksana Rodriguez MD   Hosp Day # 0 PCP Hope Cabezas     Indication and advanced into each corresponding facet joint at left L2-L3, L3-L4, L4-5 and L5-S1 level atraumatically under fluoroscopic guidance.   Following negative aspiration for CSF and blood, approximately 1 mL of 1% lidocaine with 20 mg of methylprednisolone wa

## 2018-07-31 NOTE — H&P
History & Physical Examination    Patient Name: Vanesa Crane  MRN: [id number]  [facility]: [id number]  YOB: 1945    Pre-Operative Diagnosis:  Spinal stenosis of lumbar region, unspecified whether neurogenic claudication present [M48.061]    Prese plan of care. [ x ] I have reviewed the History and Physical done within the last 30 days. Any changes noted above.     Ney Cervantes MD

## 2018-08-06 LAB — PROBNP: 123 PG/ML

## 2018-08-31 ENCOUNTER — SNF/IP PROF CHARGE ONLY (OUTPATIENT)
Dept: HEMATOLOGY/ONCOLOGY | Facility: HOSPITAL | Age: 73
End: 2018-08-31

## 2018-08-31 DIAGNOSIS — C77.3 CARCINOMA OF BREAST METASTATIC TO AXILLARY LYMPH NODE, UNSPECIFIED LATERALITY (HCC): ICD-10-CM

## 2018-08-31 DIAGNOSIS — C50.919 CARCINOMA OF BREAST METASTATIC TO AXILLARY LYMPH NODE, UNSPECIFIED LATERALITY (HCC): ICD-10-CM

## 2018-08-31 PROCEDURE — G9678 ONCOLOGY CARE MODEL SERVICE: HCPCS | Performed by: INTERNAL MEDICINE

## 2018-09-11 ENCOUNTER — TELEPHONE (OUTPATIENT)
Dept: HEMATOLOGY/ONCOLOGY | Facility: HOSPITAL | Age: 73
End: 2018-09-11

## 2018-09-19 ENCOUNTER — OFFICE VISIT (OUTPATIENT)
Dept: PAIN CLINIC | Facility: CLINIC | Age: 73
End: 2018-09-19
Payer: MEDICARE

## 2018-09-19 VITALS
SYSTOLIC BLOOD PRESSURE: 110 MMHG | DIASTOLIC BLOOD PRESSURE: 76 MMHG | HEIGHT: 61 IN | WEIGHT: 118 LBS | BODY MASS INDEX: 22.28 KG/M2 | HEART RATE: 68 BPM

## 2018-09-19 DIAGNOSIS — M54.16 LUMBAR RADICULITIS: Primary | ICD-10-CM

## 2018-09-19 PROCEDURE — 99214 OFFICE O/P EST MOD 30 MIN: CPT | Performed by: ANESTHESIOLOGY

## 2018-09-19 RX ORDER — TIOTROPIUM BROMIDE INHALATION SPRAY 3.12 UG/1
SPRAY, METERED RESPIRATORY (INHALATION)
Refills: 12 | COMMUNITY
Start: 2018-09-11

## 2018-09-19 NOTE — PATIENT INSTRUCTIONS
Refill policies:    • Allow 2-3 business days for refills; controlled substances may take longer.   • Contact your pharmacy at least 5 days prior to running out of medication and have them send an electronic request or submit request through the “request re entire amount billed. Precertification and Prior Authorizations: If your physician has recommended that you have a procedure or additional testing performed.   [facility]) will contact your insurance carrier to obtain pre-certi the procedure if you are experiencing any symptoms of infection such as cough, fever, chills, urinary symptoms, or have recently been prescribed antibiotics, have open wounds, have recently had surgery or dental procedures.      As you will be unable to kimi days  • Trental 7 days  • Eliquis (Apixaban) 3 days  • Xarelto (Rivaroxaban) 3 days  • Lovenox (Enoxaparin) 24 hours  • Aspirin  • 81mg 24 hours  • Greater than 81 mg (325mg) 7 days  • Coumadin       5 days  • Procedure may be cancelled if INR is elevated. comfort.     ** TO AVOID CANCELLATION AND/OR RESCHEDULING: PLEASE CALL [facility] PRE-PROCEDURE LINE [phone] FOR DETAILED INSTRUCTIONS FIVE TO SEVEN DAYS PRIOR TO PROCEDURE**

## 2018-09-20 ENCOUNTER — TELEPHONE (OUTPATIENT)
Dept: PAIN CLINIC | Facility: CLINIC | Age: 73
End: 2018-09-20

## 2018-09-20 DIAGNOSIS — M54.16 LUMBAR RADICULITIS: Primary | ICD-10-CM

## 2018-09-25 RX ORDER — ERGOCALCIFEROL 1.25 MG/1
CAPSULE ORAL
Qty: 4 CAPSULE | Refills: 0 | Status: SHIPPED | OUTPATIENT
Start: 2018-09-25 | End: 2018-11-14

## 2018-09-27 NOTE — PROGRESS NOTES
Name: Cecille Rausch   : 1945   DOS: 2018     Pain Clinic Follow Up Visit:   Cecille Rausch is a 74year old female who presents for recheck of her chronic low back pain. She is status post left diagnostic lumbar facet joint injection.   She mouth nightly. Disp:  Rfl:    aspirin 81 MG Oral Chew Tab Chew by mouth daily.  Only takes if B/P is greater than 140  Disp:  Rfl:    ERGOCALCIFEROL 99912 units Oral Cap TAKE 1 CAPSULE BY MOUTH 1 TIME A WEEK Disp: 4 capsule Rfl: 0         EXAM:   /76

## 2018-09-30 ENCOUNTER — SNF/IP PROF CHARGE ONLY (OUTPATIENT)
Dept: HEMATOLOGY/ONCOLOGY | Facility: HOSPITAL | Age: 73
End: 2018-09-30

## 2018-09-30 DIAGNOSIS — C77.3 CARCINOMA OF BREAST METASTATIC TO AXILLARY LYMPH NODE, UNSPECIFIED LATERALITY (HCC): ICD-10-CM

## 2018-09-30 DIAGNOSIS — C50.919 CARCINOMA OF BREAST METASTATIC TO AXILLARY LYMPH NODE, UNSPECIFIED LATERALITY (HCC): ICD-10-CM

## 2018-09-30 PROCEDURE — G9678 ONCOLOGY CARE MODEL SERVICE: HCPCS | Performed by: INTERNAL MEDICINE

## 2018-10-12 ENCOUNTER — TELEPHONE (OUTPATIENT)
Dept: SURGERY | Facility: CLINIC | Age: 73
End: 2018-10-12

## 2018-10-12 NOTE — TELEPHONE ENCOUNTER
Spoke to patient, confirmed procedure date of 10/18/18 at 9:45 am to be checked in at outpatient registration. Reiterated NOT the office but outpatient reg. Patient instructed to call pre-procedure line before procedure at [phone].  Patient instructed

## 2018-10-15 ENCOUNTER — APPOINTMENT (OUTPATIENT)
Dept: HEMATOLOGY/ONCOLOGY | Facility: HOSPITAL | Age: 73
End: 2018-10-15
Attending: INTERNAL MEDICINE
Payer: MEDICARE

## 2018-10-16 ENCOUNTER — TELEPHONE (OUTPATIENT)
Dept: SURGERY | Facility: CLINIC | Age: 73
End: 2018-10-16

## 2018-10-16 NOTE — TELEPHONE ENCOUNTER
Spoke with pt's Daughter Bia Hough who called with a condition update. Pt started taking antibiotics on Zeferino 10-14-18 for a UTI. Rescheduled injection to 10-30-18 arriving at 2:45. Daughter verbalized understanding and had no additional questions or needs.

## 2018-10-23 ENCOUNTER — TELEPHONE (OUTPATIENT)
Dept: SURGERY | Facility: CLINIC | Age: 73
End: 2018-10-23

## 2018-10-23 NOTE — TELEPHONE ENCOUNTER
Patient states she did not call, was meli Donovan, patient requests office call Manny Donovan to coordinate any changes in procedure. Spoke to meli Donovan, states she is requesting to have a morning appointment for patient.  Offered 11/13 for earliest available

## 2018-10-25 ENCOUNTER — TELEPHONE (OUTPATIENT)
Dept: HEMATOLOGY/ONCOLOGY | Facility: HOSPITAL | Age: 73
End: 2018-10-25

## 2018-10-25 NOTE — TELEPHONE ENCOUNTER
Lupe  called to speak to speak with Anirudh Chandra. She stated, she was returning a call from the nurse a few weeks ago. She needs to be scheduled for a follow up.  Lupe  can be reached at [phone] Please Advise thanks

## 2018-10-31 ENCOUNTER — SNF/IP PROF CHARGE ONLY (OUTPATIENT)
Dept: HEMATOLOGY/ONCOLOGY | Facility: HOSPITAL | Age: 73
End: 2018-10-31

## 2018-10-31 DIAGNOSIS — C77.3 CARCINOMA OF BREAST METASTATIC TO AXILLARY LYMPH NODE, UNSPECIFIED LATERALITY (HCC): ICD-10-CM

## 2018-10-31 DIAGNOSIS — C50.919 CARCINOMA OF BREAST METASTATIC TO AXILLARY LYMPH NODE, UNSPECIFIED LATERALITY (HCC): ICD-10-CM

## 2018-10-31 PROCEDURE — G9678 ONCOLOGY CARE MODEL SERVICE: HCPCS | Performed by: INTERNAL MEDICINE

## 2018-11-05 ENCOUNTER — OFFICE VISIT (OUTPATIENT)
Dept: HEMATOLOGY/ONCOLOGY | Facility: HOSPITAL | Age: 73
End: 2018-11-05
Attending: INTERNAL MEDICINE
Payer: MEDICARE

## 2018-11-05 VITALS
DIASTOLIC BLOOD PRESSURE: 72 MMHG | HEIGHT: 60.98 IN | BODY MASS INDEX: 22.4 KG/M2 | TEMPERATURE: 97 F | SYSTOLIC BLOOD PRESSURE: 119 MMHG | WEIGHT: 118.63 LBS | RESPIRATION RATE: 18 BRPM | OXYGEN SATURATION: 96 % | HEART RATE: 83 BPM

## 2018-11-05 DIAGNOSIS — C50.919 CARCINOMA OF BREAST METASTATIC TO AXILLARY LYMPH NODE, UNSPECIFIED LATERALITY (HCC): Primary | ICD-10-CM

## 2018-11-05 DIAGNOSIS — C77.3 BREAST CANCER METASTASIZED TO AXILLARY LYMPH NODE, RIGHT (HCC): ICD-10-CM

## 2018-11-05 DIAGNOSIS — C50.911 BREAST CANCER METASTASIZED TO AXILLARY LYMPH NODE, RIGHT (HCC): ICD-10-CM

## 2018-11-05 DIAGNOSIS — Z17.0 MALIGNANT NEOPLASM OF UPPER-OUTER QUADRANT OF RIGHT BREAST IN FEMALE, ESTROGEN RECEPTOR POSITIVE (HCC): ICD-10-CM

## 2018-11-05 DIAGNOSIS — C50.411 MALIGNANT NEOPLASM OF UPPER-OUTER QUADRANT OF RIGHT BREAST IN FEMALE, ESTROGEN RECEPTOR POSITIVE (HCC): ICD-10-CM

## 2018-11-05 DIAGNOSIS — M81.6 LOCALIZED OSTEOPOROSIS WITHOUT CURRENT PATHOLOGICAL FRACTURE: Primary | ICD-10-CM

## 2018-11-05 DIAGNOSIS — R30.0 DYSURIA: ICD-10-CM

## 2018-11-05 DIAGNOSIS — C77.3 CARCINOMA OF BREAST METASTATIC TO AXILLARY LYMPH NODE, UNSPECIFIED LATERALITY (HCC): Primary | ICD-10-CM

## 2018-11-05 PROCEDURE — 96372 THER/PROPH/DIAG INJ SC/IM: CPT

## 2018-11-05 PROCEDURE — 99214 OFFICE O/P EST MOD 30 MIN: CPT | Performed by: INTERNAL MEDICINE

## 2018-11-05 RX ORDER — SULFAMETHOXAZOLE AND TRIMETHOPRIM 800; 160 MG/1; MG/1
1 TABLET ORAL 2 TIMES DAILY
Qty: 6 TABLET | Refills: 0 | Status: SHIPPED | OUTPATIENT
Start: 2018-11-05 | End: 2019-04-17

## 2018-11-05 NOTE — TELEPHONE ENCOUNTER
Contacted daughter to offer 11/6 appointment opening, patient on WaitList. Daughter states patient was in to see Dr. Janay Gillespie this morning, have concern for recurring UTI and is likely starting her on another round of antibiotics.  Advised daughter and patient

## 2018-11-05 NOTE — PROGRESS NOTES
Cancer Center Progress Note    Problem List:      Patient Active Problem List:     De Quervain's syndrome (tenosynovitis)     Osteopenia     Breast cancer metastasized to axillary lymph node ([facility] [address]     Other specified counseling     Dyspnea     COPD (chroni specimen.     D. Right breast superior lateral margin, additional:  -Fatty breast tissue, negative for tumor.     E. Right breast inferior medial margin, additional:  -Fatty breast tissue, negative for tumor.        Review of Systems:   Constitutional: Ne distress. Neck: No palpable lymphadenopathy. Neck is supple. Chest: Clear to auscultation. No rales. No wheezes. Heart: Regular rate and rhythm. Breast: No breast mass. Abdomen: Soft, non tender with good bowel sounds. Extremities: No edema.  No meena

## 2018-11-06 VITALS — WEIGHT: 118 LBS | HEIGHT: 61 IN | BODY MASS INDEX: 22.28 KG/M2

## 2018-11-07 ENCOUNTER — TELEPHONE (OUTPATIENT)
Dept: HEMATOLOGY/ONCOLOGY | Facility: HOSPITAL | Age: 73
End: 2018-11-07

## 2018-11-07 RX ORDER — LEVOFLOXACIN 500 MG/1
500 TABLET, FILM COATED ORAL DAILY
Qty: 7 TABLET | Refills: 0 | Status: SHIPPED | OUTPATIENT
Start: 2018-11-07 | End: 2019-04-17

## 2018-11-09 ENCOUNTER — OFFICE VISIT (OUTPATIENT)
Dept: RADIATION ONCOLOGY | Facility: HOSPITAL | Age: 73
End: 2018-11-09
Attending: RADIOLOGY
Payer: MEDICARE

## 2018-11-09 VITALS
HEART RATE: 66 BPM | TEMPERATURE: 98 F | SYSTOLIC BLOOD PRESSURE: 112 MMHG | RESPIRATION RATE: 20 BRPM | DIASTOLIC BLOOD PRESSURE: 73 MMHG

## 2018-11-09 DIAGNOSIS — C77.3 CARCINOMA OF RIGHT BREAST METASTATIC TO AXILLARY LYMPH NODE (HCC): Primary | ICD-10-CM

## 2018-11-09 DIAGNOSIS — C50.911 CARCINOMA OF RIGHT BREAST METASTATIC TO AXILLARY LYMPH NODE (HCC): Primary | ICD-10-CM

## 2018-11-09 PROCEDURE — 99211 OFF/OP EST MAY X REQ PHY/QHP: CPT

## 2018-11-09 NOTE — PROGRESS NOTES
Pt seen in 6 month follow up with Dr Patricia Mesa, having completed radiation to the R breast 12/11/17. Recent visit with Dr Michel Yanez. Tolerating the letrozole well. Recent UTI and URI, taking Levoquin.  Next mammogram ordered for January, 2019 R breast.  Lungs cl

## 2018-11-09 NOTE — PATIENT INSTRUCTIONS
Please schedule mammogram for January, 2019. Follow up with Dr Bob Mae in 1 year. Please call Tulio Res in October 2019 for November follow up @ [phone].

## 2018-11-12 NOTE — PROGRESS NOTES
[facility] [place]    PATIENT'S NAME: [place] Elin   RADIATION ONCOLOGIST: Dillon Burton.  Carolene Peabody, MD   PATIENT ACCOUNT #: [de-identified] LOCATION: [address] [place] [address] RECORD #: [id number] YOB: 1945   FOLLOW-UP DATE: 11/09/2018       RADIAT regional irradiation and was randomized to the whole breast treatment arm. I then treated the breast to 5000 cGy in 200 cGy fractions, followed by a boost of an additional 1200 cGy in 200 cGy fractions, completing on 12/11/2017.     On her visit today, the negative. IMPRESSION AND RECOMMENDATIONS:  Overall, the patient is doing reasonably well at the current time. She denies any particular complaints or issues apart from her upper respiratory infection, for which she is receiving treatment.   She continue

## 2018-11-14 ENCOUNTER — HOSPITAL ENCOUNTER (OUTPATIENT)
Dept: INTERVENTIONAL RADIOLOGY/VASCULAR | Facility: HOSPITAL | Age: 73
Discharge: HOME | End: 2018-11-14
Attending: INTERNAL MEDICINE
Payer: MEDICARE

## 2018-11-16 ENCOUNTER — HOSPITAL ENCOUNTER (OUTPATIENT)
Dept: GENERAL RADIOLOGY | Facility: HOSPITAL | Age: 73
Discharge: HOME | End: 2018-11-16
Attending: INTERNAL MEDICINE
Payer: MEDICARE

## 2018-11-16 DIAGNOSIS — B34.9 INFECTION VIRAL: ICD-10-CM

## 2018-11-16 PROCEDURE — 71046 X-RAY EXAM CHEST 2 VIEWS: CPT | Performed by: INTERNAL MEDICINE

## 2018-11-19 RX ORDER — ERGOCALCIFEROL 1.25 MG/1
CAPSULE ORAL
Qty: 4 CAPSULE | Refills: 0 | Status: SHIPPED | OUTPATIENT
Start: 2018-11-19 | End: 2018-12-27

## 2018-11-21 ENCOUNTER — HOSPITAL ENCOUNTER (OUTPATIENT)
Dept: INTERVENTIONAL RADIOLOGY/VASCULAR | Facility: HOSPITAL | Age: 73
Discharge: HOME | End: 2018-11-21
Attending: INTERNAL MEDICINE | Admitting: INTERNAL MEDICINE
Payer: MEDICARE

## 2018-11-21 VITALS
OXYGEN SATURATION: 98 % | TEMPERATURE: 98 F | HEART RATE: 66 BPM | SYSTOLIC BLOOD PRESSURE: 122 MMHG | RESPIRATION RATE: 22 BRPM | DIASTOLIC BLOOD PRESSURE: 65 MMHG

## 2018-11-21 DIAGNOSIS — Z17.0 MALIGNANT NEOPLASM OF UPPER-OUTER QUADRANT OF RIGHT BREAST IN FEMALE, ESTROGEN RECEPTOR POSITIVE (HCC): ICD-10-CM

## 2018-11-21 DIAGNOSIS — C77.3 CARCINOMA OF BREAST METASTATIC TO AXILLARY LYMPH NODE, UNSPECIFIED LATERALITY (HCC): ICD-10-CM

## 2018-11-21 DIAGNOSIS — C50.919 CARCINOMA OF BREAST METASTATIC TO AXILLARY LYMPH NODE, UNSPECIFIED LATERALITY (HCC): ICD-10-CM

## 2018-11-21 DIAGNOSIS — C50.411 MALIGNANT NEOPLASM OF UPPER-OUTER QUADRANT OF RIGHT BREAST IN FEMALE, ESTROGEN RECEPTOR POSITIVE (HCC): ICD-10-CM

## 2018-11-21 PROCEDURE — 76937 US GUIDE VASCULAR ACCESS: CPT

## 2018-11-21 PROCEDURE — 0JPT0WZ REMOVAL OF TOTALLY IMPLANTABLE VASCULAR ACCESS DEVICE FROM TRUNK SUBCUTANEOUS TISSUE AND FASCIA, OPEN APPROACH: ICD-10-PCS | Performed by: RADIOLOGY

## 2018-11-21 PROCEDURE — 36590 REMOVAL TUNNELED CV CATH: CPT

## 2018-11-21 PROCEDURE — 77001 FLUOROGUIDE FOR VEIN DEVICE: CPT

## 2018-11-21 PROCEDURE — 85610 PROTHROMBIN TIME: CPT

## 2018-11-21 PROCEDURE — 02PY33Z REMOVAL OF INFUSION DEVICE FROM GREAT VESSEL, PERCUTANEOUS APPROACH: ICD-10-PCS | Performed by: RADIOLOGY

## 2018-11-21 PROCEDURE — 99152 MOD SED SAME PHYS/QHP 5/>YRS: CPT

## 2018-11-21 RX ORDER — CEFAZOLIN SODIUM 1 G/3ML
INJECTION, POWDER, FOR SOLUTION INTRAMUSCULAR; INTRAVENOUS
Status: COMPLETED
Start: 2018-11-21 | End: 2018-11-21

## 2018-11-21 RX ORDER — MIDAZOLAM HYDROCHLORIDE 1 MG/ML
INJECTION INTRAMUSCULAR; INTRAVENOUS
Status: COMPLETED
Start: 2018-11-21 | End: 2018-11-21

## 2018-11-21 RX ORDER — BACITRACIN 50000 [USP'U]/1
INJECTION, POWDER, LYOPHILIZED, FOR SOLUTION INTRAMUSCULAR
Status: COMPLETED
Start: 2018-11-21 | End: 2018-11-21

## 2018-11-21 RX ORDER — CHLORHEXIDINE GLUCONATE 4 G/100ML
30 SOLUTION TOPICAL ONCE
Status: COMPLETED | OUTPATIENT
Start: 2018-11-21 | End: 2018-11-21

## 2018-11-21 RX ORDER — LIDOCAINE HYDROCHLORIDE 10 MG/ML
INJECTION, SOLUTION INFILTRATION; PERINEURAL
Status: COMPLETED
Start: 2018-11-21 | End: 2018-11-21

## 2018-11-21 RX ADMIN — CHLORHEXIDINE GLUCONATE 30 ML: 4 SOLUTION TOPICAL at 08:00:00

## 2018-11-21 NOTE — PROGRESS NOTES
Patient discharged home after port removal. VSS. Denies pain. Eating and drinking. Family at bedside. Reviewed and discussed discharge instructions. Verbalized understanding. AVS given to patient. IV discontinued patient discharged home via wheelchair.

## 2018-11-21 NOTE — PROCEDURES
[facility]  Procedure Note    Alejandro Trujillo Patient Status:  Outpatient in a Bed    1945 MRN [id number]   [facility] [address] [facility] Attending Wang Levine MD   Hosp Day # 0 SHREYA Kay     Procedure: left ches

## 2018-11-21 NOTE — PRE-SEDATION ASSESSMENT
[facility]  IR Pre-Procedure Sedation Assessment    History of snoring or sleep or apnea?    Yes    History of previous problems with anesthesia or sedation  Yes    Physical Findings:  Neck: nl ROM  CV: nl  PULM: normal respiratory rate/effort    Debora

## 2018-11-23 ENCOUNTER — TELEPHONE (OUTPATIENT)
Dept: SURGERY | Facility: CLINIC | Age: 73
End: 2018-11-23

## 2018-11-23 NOTE — TELEPHONE ENCOUNTER
Spoke to patient, confirmed procedure date of 11/27 and to be checked in at outpatient registration at 7:45 am. Patient instructed to call pre-procedure line before procedure at [phone].  Patient instructed to call office if there are additional questi

## 2018-11-27 ENCOUNTER — APPOINTMENT (OUTPATIENT)
Dept: GENERAL RADIOLOGY | Facility: HOSPITAL | Age: 73
End: 2018-11-27
Attending: ANESTHESIOLOGY
Payer: MEDICARE

## 2018-11-27 ENCOUNTER — HOSPITAL ENCOUNTER (OUTPATIENT)
Facility: HOSPITAL | Age: 73
Setting detail: HOSPITAL OUTPATIENT SURGERY
Discharge: HOME | End: 2018-11-27
Attending: ANESTHESIOLOGY | Admitting: ANESTHESIOLOGY
Payer: MEDICARE

## 2018-11-27 VITALS
TEMPERATURE: 97 F | RESPIRATION RATE: 18 BRPM | HEART RATE: 60 BPM | SYSTOLIC BLOOD PRESSURE: 117 MMHG | DIASTOLIC BLOOD PRESSURE: 59 MMHG | OXYGEN SATURATION: 95 %

## 2018-11-27 DIAGNOSIS — M54.16 LUMBAR RADICULITIS: ICD-10-CM

## 2018-11-27 PROCEDURE — 3E0R3BZ INTRODUCTION OF ANESTHETIC AGENT INTO SPINAL CANAL, PERCUTANEOUS APPROACH: ICD-10-PCS | Performed by: ANESTHESIOLOGY

## 2018-11-27 PROCEDURE — 3E0R33Z INTRODUCTION OF ANTI-INFLAMMATORY INTO SPINAL CANAL, PERCUTANEOUS APPROACH: ICD-10-PCS | Performed by: ANESTHESIOLOGY

## 2018-11-27 PROCEDURE — 99152 MOD SED SAME PHYS/QHP 5/>YRS: CPT | Performed by: ANESTHESIOLOGY

## 2018-11-27 RX ORDER — MIDAZOLAM HYDROCHLORIDE 1 MG/ML
INJECTION INTRAMUSCULAR; INTRAVENOUS AS NEEDED
Status: DISCONTINUED | OUTPATIENT
Start: 2018-11-27 | End: 2018-11-27 | Stop reason: HOSPADM

## 2018-11-27 RX ORDER — SODIUM CHLORIDE, SODIUM LACTATE, POTASSIUM CHLORIDE, CALCIUM CHLORIDE 600; 310; 30; 20 MG/100ML; MG/100ML; MG/100ML; MG/100ML
INJECTION, SOLUTION INTRAVENOUS CONTINUOUS
Status: DISCONTINUED | OUTPATIENT
Start: 2018-11-27 | End: 2018-11-27

## 2018-11-27 RX ORDER — DIPHENHYDRAMINE HYDROCHLORIDE 50 MG/ML
50 INJECTION INTRAMUSCULAR; INTRAVENOUS ONCE AS NEEDED
Status: DISCONTINUED | OUTPATIENT
Start: 2018-11-27 | End: 2018-11-27

## 2018-11-27 RX ORDER — ONDANSETRON 2 MG/ML
4 INJECTION INTRAMUSCULAR; INTRAVENOUS ONCE AS NEEDED
Status: DISCONTINUED | OUTPATIENT
Start: 2018-11-27 | End: 2018-11-27

## 2018-11-27 RX ORDER — DEXAMETHASONE SODIUM PHOSPHATE 10 MG/ML
INJECTION, SOLUTION INTRAMUSCULAR; INTRAVENOUS AS NEEDED
Status: DISCONTINUED | OUTPATIENT
Start: 2018-11-27 | End: 2018-11-27 | Stop reason: HOSPADM

## 2018-11-27 RX ORDER — LIDOCAINE HYDROCHLORIDE 10 MG/ML
INJECTION, SOLUTION EPIDURAL; INFILTRATION; INTRACAUDAL; PERINEURAL AS NEEDED
Status: DISCONTINUED | OUTPATIENT
Start: 2018-11-27 | End: 2018-11-27 | Stop reason: HOSPADM

## 2018-11-27 NOTE — OR NURSING
Patient had a low blood pressure upon admission to post-op acc. Patient states she takes blood pressure meds and pressure is often low. Nurse advised patient to see PCP as soon as possible for this issue.  Daughter states they will go to PCP as soon as disc

## 2018-11-27 NOTE — H&P
History & Physical Examination    Patient Name: Doc Saenz  MRN: [id number]  CSN: [id number]  YOB: 1945    Pre-Operative Diagnosis:  Lumbar radiculitis [M54.16]    Present Illness: A 74year old female with low back pain is here for left Atorvastatin Calcium 10 MG Oral Tab Take 10 mg by mouth nightly. Disp:  Rfl:  Past Week at Unknown time   aspirin 81 MG Oral Chew Tab Chew 81 mg by mouth daily.    Disp:  Rfl:  Past Week at Unknown time       No current facility-administered medications f ] [x ]    EXTREMITIES [x ] [x ]    OTHER        [ x ] I have discussed the risks and benefits and alternatives with the patient/family. They understand and agree to proceed with plan of care.   [ x ] I have reviewed the History and Physical done within the

## 2018-11-28 NOTE — OPERATIVE REPORT
[facility]  Operative Report  2018     Vanesa Crane Patient Status:  Hospital Outpatient Surgery    1945 MRN [id number]   [facility] No att[facility] providers found   Hosp Day # 0 PCP [facility] OZAUKEE JET lidocaine. A 22-gauge 5 inch Quincke spinal needle was introduced toward the inferior aspect of the junction between the transverse process and pedicle of the left L3-L4 level atraumatically under fluoroscopic guidance.   The needle was advanced into the a

## 2018-11-30 ENCOUNTER — SNF/IP PROF CHARGE ONLY (OUTPATIENT)
Dept: HEMATOLOGY/ONCOLOGY | Facility: HOSPITAL | Age: 73
End: 2018-11-30

## 2018-11-30 DIAGNOSIS — C77.3 CARCINOMA OF BREAST METASTATIC TO AXILLARY LYMPH NODE, UNSPECIFIED LATERALITY (HCC): ICD-10-CM

## 2018-11-30 DIAGNOSIS — C50.919 CARCINOMA OF BREAST METASTATIC TO AXILLARY LYMPH NODE, UNSPECIFIED LATERALITY (HCC): ICD-10-CM

## 2018-11-30 PROCEDURE — G9678 ONCOLOGY CARE MODEL SERVICE: HCPCS | Performed by: INTERNAL MEDICINE

## 2018-12-19 ENCOUNTER — MYAURORA ACCOUNT LINK (OUTPATIENT)
Dept: OTHER | Age: 73
End: 2018-12-19

## 2018-12-19 ENCOUNTER — HOSPITAL ENCOUNTER (OUTPATIENT)
Dept: LAB | Facility: HOSPITAL | Age: 73
Discharge: HOME | End: 2018-12-19
Attending: INTERNAL MEDICINE
Payer: MEDICARE

## 2018-12-19 ENCOUNTER — HOSPITAL ENCOUNTER (OUTPATIENT)
Dept: CV DIAGNOSTICS | Facility: HOSPITAL | Age: 73
Discharge: HOME | End: 2018-12-19
Attending: INTERNAL MEDICINE

## 2018-12-19 ENCOUNTER — PRIOR ORIGINAL RECORDS (OUTPATIENT)
Dept: OTHER | Age: 73
End: 2018-12-19

## 2018-12-19 DIAGNOSIS — Z08 ENCOUNTER FOR ROUTINE CANCER FOLLOW-UP: ICD-10-CM

## 2018-12-19 PROCEDURE — 36415 COLL VENOUS BLD VENIPUNCTURE: CPT | Performed by: INTERNAL MEDICINE

## 2018-12-19 PROCEDURE — 84484 ASSAY OF TROPONIN QUANT: CPT | Performed by: INTERNAL MEDICINE

## 2018-12-19 PROCEDURE — 83880 ASSAY OF NATRIURETIC PEPTIDE: CPT | Performed by: INTERNAL MEDICINE

## 2018-12-20 ENCOUNTER — PRIOR ORIGINAL RECORDS (OUTPATIENT)
Dept: OTHER | Age: 73
End: 2018-12-20

## 2018-12-24 LAB — PROBNP: 159 PG/ML

## 2018-12-24 RX ORDER — ERGOCALCIFEROL 1.25 MG/1
CAPSULE ORAL
Qty: 4 CAPSULE | Refills: 0 | OUTPATIENT
Start: 2018-12-24

## 2018-12-27 DIAGNOSIS — E55.9 VITAMIN D DEFICIENCY: Primary | ICD-10-CM

## 2018-12-27 RX ORDER — ERGOCALCIFEROL 1.25 MG/1
CAPSULE ORAL
Qty: 12 CAPSULE | Refills: 0 | Status: SHIPPED | OUTPATIENT
Start: 2018-12-27 | End: 2019-04-01

## 2018-12-27 NOTE — TELEPHONE ENCOUNTER
Notified daughter okay per MD to do 3m refill for high dose vit d, but she will need vit d level rechecked within the 3m to see if any more is needed. Connected to PSR to schedule lab draw.

## 2018-12-31 ENCOUNTER — SNF/IP PROF CHARGE ONLY (OUTPATIENT)
Dept: HEMATOLOGY/ONCOLOGY | Facility: HOSPITAL | Age: 73
End: 2018-12-31

## 2018-12-31 DIAGNOSIS — C50.919 CARCINOMA OF BREAST METASTATIC TO AXILLARY LYMPH NODE, UNSPECIFIED LATERALITY (HCC): ICD-10-CM

## 2018-12-31 DIAGNOSIS — C77.3 CARCINOMA OF BREAST METASTATIC TO AXILLARY LYMPH NODE, UNSPECIFIED LATERALITY (HCC): ICD-10-CM

## 2018-12-31 PROCEDURE — G9678 ONCOLOGY CARE MODEL SERVICE: HCPCS | Performed by: INTERNAL MEDICINE

## 2019-01-01 ENCOUNTER — EXTERNAL RECORD (OUTPATIENT)
Dept: HEALTH INFORMATION MANAGEMENT | Facility: OTHER | Age: 74
End: 2019-01-01

## 2019-01-16 ENCOUNTER — PRIOR ORIGINAL RECORDS (OUTPATIENT)
Dept: OTHER | Age: 74
End: 2019-01-16

## 2019-01-16 ENCOUNTER — MYAURORA ACCOUNT LINK (OUTPATIENT)
Dept: OTHER | Age: 74
End: 2019-01-16

## 2019-01-16 ENCOUNTER — NURSE ONLY (OUTPATIENT)
Dept: HEMATOLOGY/ONCOLOGY | Facility: HOSPITAL | Age: 74
End: 2019-01-16
Attending: INTERNAL MEDICINE
Payer: MEDICARE

## 2019-01-16 DIAGNOSIS — E55.9 VITAMIN D DEFICIENCY: ICD-10-CM

## 2019-01-16 LAB — VIT D+METAB SERPL-MCNC: 89.6 NG/ML (ref 30–100)

## 2019-01-16 PROCEDURE — 82306 VITAMIN D 25 HYDROXY: CPT

## 2019-01-16 PROCEDURE — 36415 COLL VENOUS BLD VENIPUNCTURE: CPT

## 2019-01-18 ENCOUNTER — APPOINTMENT (OUTPATIENT)
Dept: HEMATOLOGY/ONCOLOGY | Facility: HOSPITAL | Age: 74
End: 2019-01-18
Attending: INTERNAL MEDICINE
Payer: MEDICARE

## 2019-01-18 ENCOUNTER — ORDER TRANSCRIPTION (OUTPATIENT)
Dept: ADMINISTRATIVE | Facility: HOSPITAL | Age: 74
End: 2019-01-18

## 2019-01-18 DIAGNOSIS — R06.00 DYSPNEA: Primary | ICD-10-CM

## 2019-01-21 ENCOUNTER — PRIOR ORIGINAL RECORDS (OUTPATIENT)
Dept: OTHER | Age: 74
End: 2019-01-21

## 2019-01-31 ENCOUNTER — SNF/IP PROF CHARGE ONLY (OUTPATIENT)
Dept: HEMATOLOGY/ONCOLOGY | Facility: HOSPITAL | Age: 74
End: 2019-01-31

## 2019-01-31 DIAGNOSIS — C77.3 CARCINOMA OF BREAST METASTATIC TO AXILLARY LYMPH NODE, UNSPECIFIED LATERALITY (HCC): ICD-10-CM

## 2019-01-31 DIAGNOSIS — C50.919 CARCINOMA OF BREAST METASTATIC TO AXILLARY LYMPH NODE, UNSPECIFIED LATERALITY (HCC): ICD-10-CM

## 2019-01-31 PROCEDURE — G9678 ONCOLOGY CARE MODEL SERVICE: HCPCS | Performed by: INTERNAL MEDICINE

## 2019-02-01 RX ORDER — LETROZOLE 2.5 MG/1
TABLET, FILM COATED ORAL
Qty: 90 TABLET | Refills: 0 | Status: SHIPPED | OUTPATIENT
Start: 2019-02-01 | End: 2019-04-01

## 2019-02-28 VITALS
DIASTOLIC BLOOD PRESSURE: 62 MMHG | WEIGHT: 118 LBS | BODY MASS INDEX: 23.16 KG/M2 | SYSTOLIC BLOOD PRESSURE: 110 MMHG | HEIGHT: 60 IN | HEART RATE: 65 BPM

## 2019-02-28 VITALS
DIASTOLIC BLOOD PRESSURE: 58 MMHG | HEART RATE: 88 BPM | WEIGHT: 122 LBS | BODY MASS INDEX: 23.95 KG/M2 | SYSTOLIC BLOOD PRESSURE: 94 MMHG | HEIGHT: 60 IN

## 2019-02-28 VITALS
SYSTOLIC BLOOD PRESSURE: 168 MMHG | WEIGHT: 115 LBS | BODY MASS INDEX: 22.58 KG/M2 | HEIGHT: 60 IN | DIASTOLIC BLOOD PRESSURE: 70 MMHG | HEART RATE: 80 BPM

## 2019-02-28 VITALS
SYSTOLIC BLOOD PRESSURE: 118 MMHG | HEIGHT: 60 IN | DIASTOLIC BLOOD PRESSURE: 66 MMHG | WEIGHT: 116 LBS | HEART RATE: 60 BPM | BODY MASS INDEX: 22.78 KG/M2

## 2019-03-01 ENCOUNTER — HOSPITAL ENCOUNTER (OUTPATIENT)
Dept: MAMMOGRAPHY | Facility: HOSPITAL | Age: 74
Discharge: HOME | End: 2019-03-01
Attending: RADIOLOGY
Payer: MEDICARE

## 2019-03-01 VITALS
OXYGEN SATURATION: 94 % | WEIGHT: 120.6 LBS | BODY MASS INDEX: 22.77 KG/M2 | SYSTOLIC BLOOD PRESSURE: 112 MMHG | DIASTOLIC BLOOD PRESSURE: 60 MMHG | HEIGHT: 61 IN | HEART RATE: 73 BPM

## 2019-03-01 DIAGNOSIS — C77.3 CARCINOMA OF RIGHT BREAST METASTATIC TO AXILLARY LYMPH NODE (HCC): ICD-10-CM

## 2019-03-01 DIAGNOSIS — C50.911 CARCINOMA OF RIGHT BREAST METASTATIC TO AXILLARY LYMPH NODE (HCC): ICD-10-CM

## 2019-03-01 PROCEDURE — 77061 BREAST TOMOSYNTHESIS UNI: CPT | Performed by: RADIOLOGY

## 2019-03-01 PROCEDURE — 77065 DX MAMMO INCL CAD UNI: CPT | Performed by: RADIOLOGY

## 2019-03-14 NOTE — PROGRESS NOTES
Patient here for IV fluids. Came in with complaint of nausea, no vomiting. Took Zofran 8 mg at 6 AM today. Patient cannot take Compazine, said this makes her confused and gives her nightmares. Dr. Elijah Pineda aware.  Orders received to send script for Reglan 10 mg
no

## 2019-03-19 ENCOUNTER — TELEPHONE (OUTPATIENT)
Dept: CARDIOLOGY | Age: 74
End: 2019-03-19

## 2019-03-19 DIAGNOSIS — Z08 ROUTINE CANCER FOLLOW-UP VISIT: Primary | ICD-10-CM

## 2019-03-26 RX ORDER — METOPROLOL SUCCINATE 25 MG/1
TABLET, EXTENDED RELEASE ORAL
Qty: 45 TABLET | Refills: 1 | Status: SHIPPED | OUTPATIENT
Start: 2019-03-26 | End: 2019-07-16 | Stop reason: SDUPTHER

## 2019-04-01 RX ORDER — LETROZOLE 2.5 MG/1
TABLET, FILM COATED ORAL
Qty: 90 TABLET | Refills: 2 | Status: SHIPPED | OUTPATIENT
Start: 2019-04-01 | End: 2019-04-30

## 2019-04-01 RX ORDER — ERGOCALCIFEROL 1.25 MG/1
CAPSULE ORAL
Qty: 12 CAPSULE | Refills: 0 | Status: SHIPPED | OUTPATIENT
Start: 2019-04-01 | End: 2019-06-27

## 2019-04-08 RX ORDER — LETROZOLE 2.5 MG/1
TABLET, FILM COATED ORAL
COMMUNITY
Start: 2018-11-07

## 2019-04-08 RX ORDER — LISINOPRIL 2.5 MG/1
TABLET ORAL
COMMUNITY
Start: 2019-01-16 | End: 2019-07-07 | Stop reason: SDUPTHER

## 2019-04-08 RX ORDER — ERGOCALCIFEROL 1.25 MG/1
CAPSULE ORAL
COMMUNITY
End: 2020-01-21 | Stop reason: DRUGHIGH

## 2019-04-08 RX ORDER — ATORVASTATIN CALCIUM 10 MG/1
TABLET, FILM COATED ORAL
COMMUNITY
Start: 2017-04-20 | End: 2019-07-16 | Stop reason: SDUPTHER

## 2019-04-08 RX ORDER — LISINOPRIL 5 MG/1
TABLET ORAL
COMMUNITY
Start: 2019-01-16 | End: 2019-07-07 | Stop reason: SDUPTHER

## 2019-04-08 RX ORDER — ALBUTEROL SULFATE 90 UG/1
AEROSOL, METERED RESPIRATORY (INHALATION)
COMMUNITY
Start: 2017-07-14 | End: 2019-07-16 | Stop reason: SDUPTHER

## 2019-04-08 RX ORDER — METOPROLOL SUCCINATE 25 MG/1
TABLET, EXTENDED RELEASE ORAL
COMMUNITY
Start: 2018-12-24 | End: 2019-04-08 | Stop reason: CLARIF

## 2019-04-17 ENCOUNTER — HOSPITAL ENCOUNTER (OUTPATIENT)
Dept: CT IMAGING | Facility: HOSPITAL | Age: 74
Discharge: HOME | End: 2019-04-17
Attending: INTERNAL MEDICINE
Payer: MEDICARE

## 2019-04-17 ENCOUNTER — OFFICE VISIT (OUTPATIENT)
Dept: HEMATOLOGY/ONCOLOGY | Facility: HOSPITAL | Age: 74
End: 2019-04-17
Attending: INTERNAL MEDICINE
Payer: MEDICARE

## 2019-04-17 ENCOUNTER — RT VISIT (OUTPATIENT)
Dept: RESPIRATORY THERAPY | Facility: HOSPITAL | Age: 74
End: 2019-04-17
Attending: INTERNAL MEDICINE
Payer: MEDICARE

## 2019-04-17 VITALS
RESPIRATION RATE: 18 BRPM | OXYGEN SATURATION: 97 % | HEART RATE: 76 BPM | WEIGHT: 123 LBS | TEMPERATURE: 98 F | SYSTOLIC BLOOD PRESSURE: 113 MMHG | BODY MASS INDEX: 23 KG/M2 | DIASTOLIC BLOOD PRESSURE: 69 MMHG

## 2019-04-17 DIAGNOSIS — C77.3 CARCINOMA OF BREAST METASTATIC TO AXILLARY LYMPH NODE, UNSPECIFIED LATERALITY (HCC): ICD-10-CM

## 2019-04-17 DIAGNOSIS — R07.2 PRECORDIAL PAIN: ICD-10-CM

## 2019-04-17 DIAGNOSIS — R06.00 DYSPNEA ON EXERTION: ICD-10-CM

## 2019-04-17 DIAGNOSIS — R06.00 DYSPNEA: ICD-10-CM

## 2019-04-17 DIAGNOSIS — D50.9 MICROCYTIC ANEMIA: ICD-10-CM

## 2019-04-17 DIAGNOSIS — R53.83 OTHER FATIGUE: ICD-10-CM

## 2019-04-17 DIAGNOSIS — C50.919 CARCINOMA OF BREAST METASTATIC TO AXILLARY LYMPH NODE, UNSPECIFIED LATERALITY (HCC): ICD-10-CM

## 2019-04-17 DIAGNOSIS — C50.919 CARCINOMA OF BREAST METASTATIC TO AXILLARY LYMPH NODE, UNSPECIFIED LATERALITY (HCC): Primary | ICD-10-CM

## 2019-04-17 DIAGNOSIS — C77.3 CARCINOMA OF BREAST METASTATIC TO AXILLARY LYMPH NODE, UNSPECIFIED LATERALITY (HCC): Primary | ICD-10-CM

## 2019-04-17 DIAGNOSIS — B02.29 POST HERPETIC NEURALGIA: ICD-10-CM

## 2019-04-17 PROBLEM — D50.0 IRON DEFICIENCY ANEMIA SECONDARY TO BLOOD LOSS (CHRONIC): Status: ACTIVE | Noted: 2019-04-17

## 2019-04-17 PROCEDURE — 71275 CT ANGIOGRAPHY CHEST: CPT | Performed by: INTERNAL MEDICINE

## 2019-04-17 PROCEDURE — 99214 OFFICE O/P EST MOD 30 MIN: CPT | Performed by: INTERNAL MEDICINE

## 2019-04-17 PROCEDURE — 82565 ASSAY OF CREATININE: CPT

## 2019-04-17 RX ORDER — ACETAMINOPHEN 325 MG/1
650 TABLET ORAL ONCE
Status: CANCELLED | OUTPATIENT
Start: 2019-04-17

## 2019-04-17 NOTE — PROGRESS NOTES
Outpatient Oncology Care Plan  Problem list:  fatigue    Problems related to:    disease/disease progression    Interventions:  provided general teaching    Expected outcomes:  understands plan of care    Progress towards outcome:  making progress    [facility]

## 2019-04-17 NOTE — PROGRESS NOTES
Cancer Center Progress Note    Problem List:      Patient Active Problem List:     De Quervain's syndrome (tenosynovitis)     Osteopenia     Breast cancer metastasized to axillary lymph node ([facility] [address]     Other specified counseling     Dyspnea     COPD (chroni ABDOMEN:  Stable. No abnormality. BONES:  Stable. Marked apex right thoracolumbar scoliosis. Moderate degenerative changes. OTHER:  None.     =====  CONCLUSION:    1. Negative for pulmonary embolism or thoracic aortic dissection.   2.  Stable emphysem units Oral Cap TAKE 1 CAPSULE BY MOUTH 1 TIME A WEEK Disp: 12 capsule Rfl: 0   SPIRIVA RESPIMAT 2.5 MCG/ACT Inhalation Aero Soln INL 2 PFS PO QD Disp:  Rfl: 12   PANTOPRAZOLE SODIUM 40 MG Oral Tab EC TAKE 1 TABLET(40 MG) BY MOUTH EVERY MORNING BEFORE BREAK MCH 22.2 (L) 04/17/2019    MCHC 31.0 04/17/2019    RDW 16.5 (H) 04/17/2019    .0 (H) 04/17/2019     Lab Results   Component Value Date     04/17/2019    K 4.3 04/17/2019     04/17/2019    CO2 23.0 04/17/2019    BUN 18 04/17/2019    CREAT

## 2019-04-17 NOTE — PROCEDURES
Spirometry and flow volume loop appear normal with no evidence of airway obstruction     Normal TLC, no evidence of restriction  There is mild increased RV / TLC ratio with normal TLC , suggestive of air trapping     There is severe decrease in the diffusi

## 2019-04-18 ENCOUNTER — HOSPITAL ENCOUNTER (EMERGENCY)
Facility: HOSPITAL | Age: 74
Discharge: HOME | End: 2019-04-18
Attending: NURSE PRACTITIONER
Payer: MEDICARE

## 2019-04-18 ENCOUNTER — OFFICE VISIT (OUTPATIENT)
Dept: HEMATOLOGY/ONCOLOGY | Facility: HOSPITAL | Age: 74
End: 2019-04-18
Attending: INTERNAL MEDICINE
Payer: MEDICARE

## 2019-04-18 ENCOUNTER — APPOINTMENT (OUTPATIENT)
Dept: GENERAL RADIOLOGY | Facility: HOSPITAL | Age: 74
End: 2019-04-18
Attending: EMERGENCY MEDICINE
Payer: MEDICARE

## 2019-04-18 ENCOUNTER — APPOINTMENT (OUTPATIENT)
Dept: HEMATOLOGY/ONCOLOGY | Facility: HOSPITAL | Age: 74
End: 2019-04-18
Attending: NURSE PRACTITIONER
Payer: MEDICARE

## 2019-04-18 VITALS
TEMPERATURE: 98 F | HEART RATE: 70 BPM | SYSTOLIC BLOOD PRESSURE: 106 MMHG | RESPIRATION RATE: 26 BRPM | BODY MASS INDEX: 23 KG/M2 | WEIGHT: 123 LBS | OXYGEN SATURATION: 100 % | DIASTOLIC BLOOD PRESSURE: 70 MMHG

## 2019-04-18 VITALS
RESPIRATION RATE: 20 BRPM | OXYGEN SATURATION: 100 % | HEART RATE: 70 BPM | TEMPERATURE: 98 F | DIASTOLIC BLOOD PRESSURE: 63 MMHG | SYSTOLIC BLOOD PRESSURE: 110 MMHG

## 2019-04-18 DIAGNOSIS — R06.00 DYSPNEA ON EXERTION: ICD-10-CM

## 2019-04-18 DIAGNOSIS — D64.9 ANEMIA, UNSPECIFIED TYPE: ICD-10-CM

## 2019-04-18 DIAGNOSIS — C50.919 BREAST CANCER METASTASIZED TO AXILLARY LYMPH NODE, UNSPECIFIED LATERALITY (HCC): ICD-10-CM

## 2019-04-18 DIAGNOSIS — D50.0 IRON DEFICIENCY ANEMIA SECONDARY TO BLOOD LOSS (CHRONIC): Primary | ICD-10-CM

## 2019-04-18 DIAGNOSIS — C77.3 BREAST CANCER METASTASIZED TO AXILLARY LYMPH NODE, UNSPECIFIED LATERALITY (HCC): ICD-10-CM

## 2019-04-18 DIAGNOSIS — J44.1 COPD EXACERBATION (HCC): Primary | ICD-10-CM

## 2019-04-18 DIAGNOSIS — C50.919 MALIGNANT NEOPLASM OF FEMALE BREAST, UNSPECIFIED ESTROGEN RECEPTOR STATUS, UNSPECIFIED LATERALITY, UNSPECIFIED SITE OF BREAST (HCC): ICD-10-CM

## 2019-04-18 PROBLEM — R53.83 OTHER FATIGUE: Status: ACTIVE | Noted: 2019-04-18

## 2019-04-18 PROBLEM — R06.09 DYSPNEA ON EXERTION: Status: ACTIVE | Noted: 2017-05-08

## 2019-04-18 PROBLEM — E87.6 HYPOKALEMIA: Status: RESOLVED | Noted: 2017-05-25 | Resolved: 2019-04-18

## 2019-04-18 PROBLEM — R19.7 DIARRHEA WITH DEHYDRATION: Status: RESOLVED | Noted: 2017-05-25 | Resolved: 2019-04-18

## 2019-04-18 PROBLEM — K59.03 DRUG-INDUCED CONSTIPATION: Status: RESOLVED | Noted: 2017-08-04 | Resolved: 2019-04-18

## 2019-04-18 PROBLEM — R10.9 ABDOMINAL PAIN: Status: RESOLVED | Noted: 2017-05-11 | Resolved: 2019-04-18

## 2019-04-18 PROBLEM — R10.9 ABDOMINAL CRAMPING: Status: RESOLVED | Noted: 2017-05-25 | Resolved: 2019-04-18

## 2019-04-18 PROBLEM — T45.1X5A ADVERSE EFFECT OF CHEMOTHERAPY: Status: RESOLVED | Noted: 2017-05-25 | Resolved: 2019-04-18

## 2019-04-18 PROCEDURE — 86850 RBC ANTIBODY SCREEN: CPT

## 2019-04-18 PROCEDURE — 94640 AIRWAY INHALATION TREATMENT: CPT

## 2019-04-18 PROCEDURE — 36430 TRANSFUSION BLD/BLD COMPNT: CPT

## 2019-04-18 PROCEDURE — 84484 ASSAY OF TROPONIN QUANT: CPT | Performed by: EMERGENCY MEDICINE

## 2019-04-18 PROCEDURE — 99285 EMERGENCY DEPT VISIT HI MDM: CPT | Performed by: EMERGENCY MEDICINE

## 2019-04-18 PROCEDURE — 85610 PROTHROMBIN TIME: CPT | Performed by: EMERGENCY MEDICINE

## 2019-04-18 PROCEDURE — 80053 COMPREHEN METABOLIC PANEL: CPT | Performed by: EMERGENCY MEDICINE

## 2019-04-18 PROCEDURE — 93010 ELECTROCARDIOGRAM REPORT: CPT | Performed by: EMERGENCY MEDICINE

## 2019-04-18 PROCEDURE — 71045 X-RAY EXAM CHEST 1 VIEW: CPT | Performed by: EMERGENCY MEDICINE

## 2019-04-18 PROCEDURE — 93005 ELECTROCARDIOGRAM TRACING: CPT

## 2019-04-18 PROCEDURE — 86901 BLOOD TYPING SEROLOGIC RH(D): CPT

## 2019-04-18 PROCEDURE — 85730 THROMBOPLASTIN TIME PARTIAL: CPT | Performed by: EMERGENCY MEDICINE

## 2019-04-18 PROCEDURE — 83880 ASSAY OF NATRIURETIC PEPTIDE: CPT | Performed by: EMERGENCY MEDICINE

## 2019-04-18 PROCEDURE — 85025 COMPLETE CBC W/AUTO DIFF WBC: CPT | Performed by: EMERGENCY MEDICINE

## 2019-04-18 PROCEDURE — 87040 BLOOD CULTURE FOR BACTERIA: CPT | Performed by: EMERGENCY MEDICINE

## 2019-04-18 PROCEDURE — 86900 BLOOD TYPING SEROLOGIC ABO: CPT

## 2019-04-18 PROCEDURE — 36415 COLL VENOUS BLD VENIPUNCTURE: CPT | Performed by: EMERGENCY MEDICINE

## 2019-04-18 PROCEDURE — 86920 COMPATIBILITY TEST SPIN: CPT

## 2019-04-18 RX ORDER — ALPRAZOLAM 0.25 MG/1
0.25 TABLET ORAL NIGHTLY PRN
COMMUNITY
End: 2019-04-18

## 2019-04-18 RX ORDER — ACETAMINOPHEN 325 MG/1
650 TABLET ORAL ONCE
Status: CANCELLED | OUTPATIENT
Start: 2019-04-18

## 2019-04-18 RX ORDER — IPRATROPIUM BROMIDE AND ALBUTEROL SULFATE 2.5; .5 MG/3ML; MG/3ML
3 SOLUTION RESPIRATORY (INHALATION) ONCE
Status: COMPLETED | OUTPATIENT
Start: 2019-04-18 | End: 2019-04-18

## 2019-04-18 NOTE — PROGRESS NOTES
Pt here for 1 U RBC. Pt noted she took 3 motrins this morning. Deferring tylenol at this time.  Pt has previously tolerated blood transfusion in the past.     Education Record    Learner:  Patient and Family Member    Disease / Diagnosis:    Tiera / Ameya Garcia

## 2019-04-18 NOTE — ED PROVIDER NOTES
Patient Seen in: [facility] Emergency Department    History   Patient presents with:  Dyspnea MANJEET SOB (respiratory)    Stated Complaint: manjeet from cancer center was receiving a blood transfusion, did not finish transf*    HPI    78-year-old female wi Performed by Marly Crump MD at [facility] MAIN OR   • TUBAL LIGATION             Social History    Tobacco Use      Smoking status: Former Smoker        Packs/day: 1.00        Years: 40.00        Pack years: 40        Types: Cigarettes        Quit date: 6 Abnormal; Notable for the following components:       Result Value    BUN/CREA Ratio 21.3 (*)     All other components within normal limits   CBC W/ DIFFERENTIAL - Abnormal; Notable for the following components:    RBC 3.38 (*)     HGB 7.7 (*)     HCT 24. 7 emergency medical condition was not or was no longer present. There was no indication for further evaluation, treatment, or admission on an emergency basis.   Comprehensive verbal and written discharge and follow-up instructions were provided to help preve

## 2019-04-18 NOTE — ED INITIAL ASSESSMENT (HPI)
PT here from cancer center after experiencing shortness of breath and dizziness during blood transfusion today. Patient states symptoms have resolved. Patient denies any chest pain at this time.  Breathing mildly labored, reports this is her normal, history

## 2019-04-18 NOTE — PROGRESS NOTES
Pt reporting feeling SOB, VSS, but breathing was labored. Blood stopped. APN notified. 02 2L applied. EKG was ordered, but APN decided to send to ER. I transported pt via wheelchair.

## 2019-04-18 NOTE — ED NOTES
Pt wheelchair to bathroom, noted mild difficulty in breathing upon exertion. Saturating 96% on RA, denies dizziness or light headedness.

## 2019-04-19 ENCOUNTER — TELEPHONE (OUTPATIENT)
Dept: HEMATOLOGY/ONCOLOGY | Facility: HOSPITAL | Age: 74
End: 2019-04-19

## 2019-04-19 NOTE — TELEPHONE ENCOUNTER
Patient was informed  That Dr Rosalio Heranndez would like to see her next week to discuss with her IV iron and seeing Gastroenterologist. Pt verbalizes understanding

## 2019-04-24 ENCOUNTER — OFFICE VISIT (OUTPATIENT)
Dept: HEMATOLOGY/ONCOLOGY | Facility: HOSPITAL | Age: 74
End: 2019-04-24
Attending: INTERNAL MEDICINE
Payer: MEDICARE

## 2019-04-24 VITALS
SYSTOLIC BLOOD PRESSURE: 100 MMHG | HEART RATE: 82 BPM | DIASTOLIC BLOOD PRESSURE: 60 MMHG | RESPIRATION RATE: 18 BRPM | TEMPERATURE: 98 F | OXYGEN SATURATION: 98 %

## 2019-04-24 VITALS
OXYGEN SATURATION: 97 % | SYSTOLIC BLOOD PRESSURE: 129 MMHG | WEIGHT: 125.38 LBS | TEMPERATURE: 97 F | DIASTOLIC BLOOD PRESSURE: 79 MMHG | BODY MASS INDEX: 23.67 KG/M2 | HEART RATE: 88 BPM | RESPIRATION RATE: 16 BRPM | HEIGHT: 61 IN

## 2019-04-24 DIAGNOSIS — Z17.0 MALIGNANT NEOPLASM OF UPPER-OUTER QUADRANT OF RIGHT BREAST IN FEMALE, ESTROGEN RECEPTOR POSITIVE (HCC): ICD-10-CM

## 2019-04-24 DIAGNOSIS — D50.0 IRON DEFICIENCY ANEMIA SECONDARY TO BLOOD LOSS (CHRONIC): Primary | ICD-10-CM

## 2019-04-24 DIAGNOSIS — C77.3 BREAST CANCER METASTASIZED TO AXILLARY LYMPH NODE, RIGHT (HCC): ICD-10-CM

## 2019-04-24 DIAGNOSIS — M81.6 LOCALIZED OSTEOPOROSIS WITHOUT CURRENT PATHOLOGICAL FRACTURE: Primary | ICD-10-CM

## 2019-04-24 DIAGNOSIS — C50.911 BREAST CANCER METASTASIZED TO AXILLARY LYMPH NODE, RIGHT (HCC): ICD-10-CM

## 2019-04-24 DIAGNOSIS — C50.411 MALIGNANT NEOPLASM OF UPPER-OUTER QUADRANT OF RIGHT BREAST IN FEMALE, ESTROGEN RECEPTOR POSITIVE (HCC): ICD-10-CM

## 2019-04-24 PROCEDURE — 99213 OFFICE O/P EST LOW 20 MIN: CPT | Performed by: INTERNAL MEDICINE

## 2019-04-24 PROCEDURE — 96376 TX/PRO/DX INJ SAME DRUG ADON: CPT

## 2019-04-24 PROCEDURE — 96365 THER/PROPH/DIAG IV INF INIT: CPT

## 2019-04-24 NOTE — PROGRESS NOTES
Education Record    Learner:  Patient    Disease / Diagnosis: SAFIA - IV Iron infusion    Barriers / Limitations:  None   Comments:    Method:  Brief focused and Reinforcement   Comments:    General Topics:  Plan of care reviewed   Comments:    Outcome:  Qi

## 2019-04-30 ENCOUNTER — SNF/IP PROF CHARGE ONLY (OUTPATIENT)
Dept: HEMATOLOGY/ONCOLOGY | Facility: HOSPITAL | Age: 74
End: 2019-04-30

## 2019-04-30 DIAGNOSIS — C50.919 CARCINOMA OF BREAST METASTATIC TO AXILLARY LYMPH NODE, UNSPECIFIED LATERALITY (HCC): ICD-10-CM

## 2019-04-30 DIAGNOSIS — C77.3 CARCINOMA OF BREAST METASTATIC TO AXILLARY LYMPH NODE, UNSPECIFIED LATERALITY (HCC): ICD-10-CM

## 2019-04-30 PROCEDURE — G9678 ONCOLOGY CARE MODEL SERVICE: HCPCS | Performed by: INTERNAL MEDICINE

## 2019-04-30 RX ORDER — SODIUM CHLORIDE, SODIUM LACTATE, POTASSIUM CHLORIDE, CALCIUM CHLORIDE 600; 310; 30; 20 MG/100ML; MG/100ML; MG/100ML; MG/100ML
INJECTION, SOLUTION INTRAVENOUS CONTINUOUS
Status: CANCELLED | OUTPATIENT
Start: 2019-04-30

## 2019-04-30 RX ORDER — MULTIVIT-MIN/IRON FUM/FOLIC AC 7.5 MG-4
1 TABLET ORAL DAILY
COMMUNITY
End: 2020-08-17

## 2019-04-30 RX ORDER — IBUPROFEN 200 MG
200 TABLET ORAL EVERY 6 HOURS PRN
COMMUNITY
End: 2020-08-17

## 2019-04-30 NOTE — H&P (VIEW-ONLY)
[facility] Gastroenterology Clinic - History and Physical                                                                                         Patient presents with:  Establish Care  Anemia  Iron Deficiency      HISTORY OF PRESENT ILLNESS:   Rigo Novoa Performed by Shahnaz Mtz MD at [facility] MAIN OR   • LUMPECTOMY RIGHT     • RADIATION RIGHT     • SENT LYMPH NODE BIOPSY     • TRANSFORAMINAL LUMBAR EPIDURAL STEROID INJECTION MULTIPLE LEVEL Left 11/27/2018    Performed by Shahnaz Mtz MD at [address] [place] [address] Base) MCG/ACT Inhalation Aero Soln Inhale into the lungs every 6 (six) hours as needed for Wheezing. Disp:  Rfl:    Atorvastatin Calcium 10 MG Oral Tab Take 10 mg by mouth nightly. Disp:  Rfl:    aspirin 81 MG Oral Chew Tab Chew 81 mg by mouth daily.    Dis scheduled at [address] [facility]. Eusebio Anglin  Gastroenterology/Advanced Endoscopy  [facility]      Orders This Visit:  No orders of the defined types were placed in this encounter.       Meds This Visit:  Requested Prescriptions     Signed

## 2019-05-02 ENCOUNTER — RESEARCH ENCOUNTER (OUTPATIENT)
Dept: HEMATOLOGY/ONCOLOGY | Facility: HOSPITAL | Age: 74
End: 2019-05-02

## 2019-05-02 ENCOUNTER — ANESTHESIA EVENT (OUTPATIENT)
Dept: ENDOSCOPY | Facility: HOSPITAL | Age: 74
End: 2019-05-02

## 2019-05-02 ENCOUNTER — TELEPHONE (OUTPATIENT)
Dept: CARDIOLOGY | Age: 74
End: 2019-05-02

## 2019-05-02 NOTE — PROGRESS NOTES
STUDY: B-51  ID # [id number]    Patient due for 18 month f/u with  this month and noted no appointment was made. Phoned patient, reminded her and she will call to make appt next week.

## 2019-05-03 ENCOUNTER — HOSPITAL ENCOUNTER (OUTPATIENT)
Facility: HOSPITAL | Age: 74
Setting detail: HOSPITAL OUTPATIENT SURGERY
Discharge: HOME | End: 2019-05-03
Attending: INTERNAL MEDICINE | Admitting: INTERNAL MEDICINE
Payer: MEDICARE

## 2019-05-03 ENCOUNTER — ANESTHESIA (OUTPATIENT)
Dept: ENDOSCOPY | Facility: HOSPITAL | Age: 74
End: 2019-05-03

## 2019-05-03 ENCOUNTER — HOSPITAL ENCOUNTER (OUTPATIENT)
Dept: CT IMAGING | Facility: HOSPITAL | Age: 74
Discharge: HOME | End: 2019-05-03
Attending: INTERNAL MEDICINE
Payer: MEDICARE

## 2019-05-03 VITALS
HEIGHT: 61 IN | DIASTOLIC BLOOD PRESSURE: 50 MMHG | SYSTOLIC BLOOD PRESSURE: 86 MMHG | BODY MASS INDEX: 23.41 KG/M2 | RESPIRATION RATE: 16 BRPM | WEIGHT: 124 LBS | TEMPERATURE: 98 F | OXYGEN SATURATION: 95 % | HEART RATE: 75 BPM

## 2019-05-03 DIAGNOSIS — D50.8 OTHER IRON DEFICIENCY ANEMIA: ICD-10-CM

## 2019-05-03 DIAGNOSIS — R10.9 ABDOMINAL PAIN: ICD-10-CM

## 2019-05-03 PROCEDURE — 0D598ZZ DESTRUCTION OF DUODENUM, VIA NATURAL OR ARTIFICIAL OPENING ENDOSCOPIC: ICD-10-PCS | Performed by: INTERNAL MEDICINE

## 2019-05-03 PROCEDURE — 74178 CT ABD&PLV WO CNTR FLWD CNTR: CPT | Performed by: INTERNAL MEDICINE

## 2019-05-03 PROCEDURE — 0DBL8ZX EXCISION OF TRANSVERSE COLON, VIA NATURAL OR ARTIFICIAL OPENING ENDOSCOPIC, DIAGNOSTIC: ICD-10-PCS | Performed by: INTERNAL MEDICINE

## 2019-05-03 PROCEDURE — 88305 TISSUE EXAM BY PATHOLOGIST: CPT | Performed by: INTERNAL MEDICINE

## 2019-05-03 RX ORDER — SODIUM CHLORIDE, SODIUM LACTATE, POTASSIUM CHLORIDE, CALCIUM CHLORIDE 600; 310; 30; 20 MG/100ML; MG/100ML; MG/100ML; MG/100ML
INJECTION, SOLUTION INTRAVENOUS CONTINUOUS
Status: DISCONTINUED | OUTPATIENT
Start: 2019-05-03 | End: 2019-05-03

## 2019-05-03 NOTE — ANESTHESIA PREPROCEDURE EVALUATION
PRE-OP EVALUATION    Patient Name: Dwight Loza    Pre-op Diagnosis: Other iron deficiency anemia [D50.8]    Procedure(s):  ESOPHAGOGASTRODUODENOSCOPY  COLONOSCOPY      Surgeon(s) and Role:     * Farhan Zabala, DO - Luca    Pre-op vitals reviewed. (+) hyperlipidemia                    (-) angina     (+) CHAVIS         Endo/Other      (-) diabetes     (-) hypothyroidism  (-) hyperthyroidism                     Pulmonary        (+) COPD            (-) sleep apnea       Neuro/Psych             (+) TIA Cardiovascular      Rhythm: regular  Rate: normal     Dental             Pulmonary      Breath sounds clear to auscultation bilaterally. Other findings            ASA: 3   Plan: MAC  NPO status verified and patient meets guidelines.   Patient

## 2019-05-03 NOTE — ANESTHESIA POSTPROCEDURE EVALUATION
[facility] Patient Status:  Hospital Outpatient Surgery   Age/Gender 74year old female MRN [id number]   [facility] ENDOSCOPY Attending Carlos Suh, [address] Day # 0 PCP Quentin Major       Anesthesia Post-op No

## 2019-05-03 NOTE — INTERVAL H&P NOTE
Pre-op Diagnosis: Other iron deficiency anemia [D50.8]    The above referenced H&P was reviewed by Lisa Hernandez DO on 5/3/2019, the patient was examined and no significant changes have occurred in the patient's condition since the H&P was performed.   I

## 2019-05-03 NOTE — OPERATIVE REPORT
Bhavana Bonner Patient Status:  Hospital Outpatient Surgery    1945 MRN [id number]   [facility] ENDOSCOPY Attending Delisa Durbin, DO   Hosp Day # 0 PCP Sumeet Lenz       PREOPERATIVE DIAGNOSIS/INDICATION: Iron Defic RECOMMENDATIONS:    1. Obtain approval for capsule endoscopy. 2. Follow up with hematology.      Severo Miranda  Gastroenterology/Advanced Endoscopy  [facility]

## 2019-05-03 NOTE — OPERATIVE REPORT
Barbara Cortés Patient Status:  Hospital Outpatient Surgery    1945 MRN [id number]   [facility] ENDOSCOPY Attending Maria Guadalupe Arguello, DO   Hosp Day # 0 PCP Amaris Mccrary       PREOPERATIVE DIAGNOSIS/INDICATION: Iron Deficie s/p hot snare polypectomy. One hemoclip placed to close the resection site. Descending colon: The mucosa and vascular pattern were normal.  Sigmoid colon: The mucosa and vascular pattern were normal.  Rectum:  The mucosa and vascular pattern were normal.

## 2019-05-06 ENCOUNTER — APPOINTMENT (OUTPATIENT)
Dept: HEMATOLOGY/ONCOLOGY | Facility: HOSPITAL | Age: 74
End: 2019-05-06
Attending: INTERNAL MEDICINE
Payer: MEDICARE

## 2019-05-14 NOTE — PROGRESS NOTES
Cancer Center Progress Note    Problem List:      Patient Active Problem List:     De Quervain's syndrome (tenosynovitis)     Breast cancer metastasized to axillary lymph node ([address]     Other specified counseling     Dyspnea on exertion     COPD (chronic o Diagnosis Date   • Acute, but ill-defined, cerebrovascular disease    • Anemia    • Back problem    • Belching    • Bloating    • Breast cancer ([facility] 2017   • Chest pain on exertion    • Constipation    • COPD (chronic obstructive pulmonary disease) ([facility]. BY MOUTH EVERY MORNING BEFORE BREAKFAST Disp: 30 tablet Rfl: 11   LISINOPRIL OR Take 7.5 mg by mouth daily. Disp:  Rfl:    Fluticasone Furoate-Vilanterol (BREO ELLIPTA IN) Inhale 1 puff into the lungs daily.    Disp:  Rfl:    metoprolol succinate 12.5 mg Lab Results   Component Value Date    WBC 9.8 04/18/2019    RBC 3.38 (L) 04/18/2019    HGB 7.7 (L) 04/18/2019    HCT 24.7 (L) 04/18/2019    MCV 73.1 (L) 04/18/2019    MCH 22.8 (L) 04/18/2019    MCHC 31.2 04/18/2019    RDW 17.0 (H) 04/18/2019     appropriate for patient age. BONES:  No bony lesion or fracture. LUNG BASES:  Areas of fibrotic change noted at the lung bases. No focal consolidation is seen. OTHER:  Negative.       =====  CONCLUSION:  No free fluid is seen.   No evidence of free

## 2019-05-22 ENCOUNTER — OFFICE VISIT (OUTPATIENT)
Dept: HEMATOLOGY/ONCOLOGY | Facility: HOSPITAL | Age: 74
End: 2019-05-22
Attending: INTERNAL MEDICINE
Payer: MEDICARE

## 2019-05-22 VITALS
HEART RATE: 59 BPM | SYSTOLIC BLOOD PRESSURE: 96 MMHG | HEIGHT: 60.98 IN | WEIGHT: 122 LBS | TEMPERATURE: 97 F | RESPIRATION RATE: 18 BRPM | OXYGEN SATURATION: 100 % | BODY MASS INDEX: 23.03 KG/M2 | DIASTOLIC BLOOD PRESSURE: 63 MMHG

## 2019-05-22 DIAGNOSIS — C50.911 BREAST CANCER METASTASIZED TO AXILLARY LYMPH NODE, RIGHT (HCC): ICD-10-CM

## 2019-05-22 DIAGNOSIS — C77.3 BREAST CANCER METASTASIZED TO AXILLARY LYMPH NODE, RIGHT (HCC): ICD-10-CM

## 2019-05-22 DIAGNOSIS — J43.8 OTHER EMPHYSEMA (HCC): ICD-10-CM

## 2019-05-22 DIAGNOSIS — M81.6 LOCALIZED OSTEOPOROSIS WITHOUT CURRENT PATHOLOGICAL FRACTURE: Primary | ICD-10-CM

## 2019-05-22 DIAGNOSIS — Z17.0 MALIGNANT NEOPLASM OF UPPER-OUTER QUADRANT OF RIGHT BREAST IN FEMALE, ESTROGEN RECEPTOR POSITIVE (HCC): Primary | ICD-10-CM

## 2019-05-22 DIAGNOSIS — C50.411 MALIGNANT NEOPLASM OF UPPER-OUTER QUADRANT OF RIGHT BREAST IN FEMALE, ESTROGEN RECEPTOR POSITIVE (HCC): Primary | ICD-10-CM

## 2019-05-22 DIAGNOSIS — D50.0 IRON DEFICIENCY ANEMIA SECONDARY TO BLOOD LOSS (CHRONIC): ICD-10-CM

## 2019-05-22 PROCEDURE — 96372 THER/PROPH/DIAG INJ SC/IM: CPT

## 2019-05-22 PROCEDURE — 82565 ASSAY OF CREATININE: CPT

## 2019-05-22 PROCEDURE — 99214 OFFICE O/P EST MOD 30 MIN: CPT | Performed by: INTERNAL MEDICINE

## 2019-05-22 PROCEDURE — 83735 ASSAY OF MAGNESIUM: CPT

## 2019-05-22 PROCEDURE — 84100 ASSAY OF PHOSPHORUS: CPT

## 2019-05-22 PROCEDURE — 82310 ASSAY OF CALCIUM: CPT

## 2019-05-22 PROCEDURE — 82040 ASSAY OF SERUM ALBUMIN: CPT

## 2019-05-22 NOTE — PROGRESS NOTES
Cancer Center Progress Note    Problem List:      Patient Active Problem List:     De Quervain's syndrome (tenosynovitis)     Breast cancer metastasized to axillary lymph node ([address]     Other specified counseling     Dyspnea on exertion     COPD (chronic o for nausea, vomiting, change in bowel habits, diarrhea, constipation and abdominal pain. Integument/breast: Negative for rash, skin lesions, and pruritus. Hematologic/lymphatic: Negative for easy bruising, bleeding, and lymphadenopathy.   Musculoskeletal: 4/2017   • LUMBAR FACET INJECTION BILATERAL Left 7/31/2018    Performed by Justa Sloan MD at [facility] MAIN OR   • LUMPECTOMY RIGHT     • RADIATION RIGHT     • SENT LYMPH NODE BIOPSY     • TRANSFORAMINAL LUMBAR EPIDURAL STEROID INJECTION MULTIPLE LEVEL Left Status:  ECOG 0: Fully active, able to carry on all pre-disease performance without restriction     Physical Examination:    Constitutional: Patient is alert and oriented x 3, not in acute distress. Eyes: Anicteric sclera, pink conjunctiva.   HEENT:  Oroph measures 1.1 x 1.6 cm may represent a small cyst.  ADRENALS:  No mass or enlargement. AORTA/VASCULAR:  Mild aneurysmal dilatation of the infrarenal abdominal aorta measuring up to 2.4 x 2.5 cm.   Mixed atherosclerotic plaque present within the visualized meds.    Iron deficiency anemia with GI blood loss:    She has AVM in the dodenum that were treated. She declined proceeding with capsule endoscopy. We will follow the cbc monthly and see her in three months with repeat cbc and ferritin.         Vidal Cuevas

## 2019-05-30 ENCOUNTER — MED REC SCAN ONLY (OUTPATIENT)
Dept: HEMATOLOGY/ONCOLOGY | Facility: HOSPITAL | Age: 74
End: 2019-05-30

## 2019-05-31 ENCOUNTER — SNF/IP PROF CHARGE ONLY (OUTPATIENT)
Dept: HEMATOLOGY/ONCOLOGY | Facility: HOSPITAL | Age: 74
End: 2019-05-31

## 2019-05-31 DIAGNOSIS — C50.919 CARCINOMA OF BREAST METASTATIC TO AXILLARY LYMPH NODE, UNSPECIFIED LATERALITY (HCC): ICD-10-CM

## 2019-05-31 DIAGNOSIS — C77.3 CARCINOMA OF BREAST METASTATIC TO AXILLARY LYMPH NODE, UNSPECIFIED LATERALITY (HCC): ICD-10-CM

## 2019-05-31 PROCEDURE — G9678 ONCOLOGY CARE MODEL SERVICE: HCPCS | Performed by: INTERNAL MEDICINE

## 2019-06-03 RX ORDER — PANTOPRAZOLE SODIUM 40 MG/1
TABLET, DELAYED RELEASE ORAL
Qty: 90 TABLET | Refills: 0 | Status: SHIPPED | OUTPATIENT
Start: 2019-06-03 | End: 2019-08-08

## 2019-06-19 ENCOUNTER — NURSE ONLY (OUTPATIENT)
Dept: HEMATOLOGY/ONCOLOGY | Facility: HOSPITAL | Age: 74
End: 2019-06-19
Attending: INTERNAL MEDICINE
Payer: MEDICARE

## 2019-06-19 DIAGNOSIS — Z17.0 MALIGNANT NEOPLASM OF UPPER-OUTER QUADRANT OF RIGHT BREAST IN FEMALE, ESTROGEN RECEPTOR POSITIVE (HCC): ICD-10-CM

## 2019-06-19 DIAGNOSIS — C77.3 BREAST CANCER METASTASIZED TO AXILLARY LYMPH NODE, RIGHT (HCC): ICD-10-CM

## 2019-06-19 DIAGNOSIS — D50.0 IRON DEFICIENCY ANEMIA SECONDARY TO BLOOD LOSS (CHRONIC): ICD-10-CM

## 2019-06-19 DIAGNOSIS — C50.911 BREAST CANCER METASTASIZED TO AXILLARY LYMPH NODE, RIGHT (HCC): ICD-10-CM

## 2019-06-19 DIAGNOSIS — C50.411 MALIGNANT NEOPLASM OF UPPER-OUTER QUADRANT OF RIGHT BREAST IN FEMALE, ESTROGEN RECEPTOR POSITIVE (HCC): ICD-10-CM

## 2019-06-19 DIAGNOSIS — J43.8 OTHER EMPHYSEMA (HCC): ICD-10-CM

## 2019-06-19 PROCEDURE — 36415 COLL VENOUS BLD VENIPUNCTURE: CPT

## 2019-06-19 PROCEDURE — 82728 ASSAY OF FERRITIN: CPT

## 2019-06-19 PROCEDURE — 85025 COMPLETE CBC W/AUTO DIFF WBC: CPT

## 2019-06-27 RX ORDER — ERGOCALCIFEROL 1.25 MG/1
CAPSULE ORAL
Qty: 12 CAPSULE | Refills: 0 | Status: SHIPPED | OUTPATIENT
Start: 2019-06-27 | End: 2019-09-14

## 2019-06-30 ENCOUNTER — SNF/IP PROF CHARGE ONLY (OUTPATIENT)
Dept: HEMATOLOGY/ONCOLOGY | Facility: HOSPITAL | Age: 74
End: 2019-06-30

## 2019-06-30 DIAGNOSIS — C50.919 CARCINOMA OF BREAST METASTATIC TO AXILLARY LYMPH NODE, UNSPECIFIED LATERALITY (HCC): ICD-10-CM

## 2019-06-30 DIAGNOSIS — C77.3 CARCINOMA OF BREAST METASTATIC TO AXILLARY LYMPH NODE, UNSPECIFIED LATERALITY (HCC): ICD-10-CM

## 2019-06-30 PROCEDURE — G9678 ONCOLOGY CARE MODEL SERVICE: HCPCS | Performed by: INTERNAL MEDICINE

## 2019-07-05 ENCOUNTER — HOSPITAL ENCOUNTER (OUTPATIENT)
Dept: CV DIAGNOSTICS | Facility: HOSPITAL | Age: 74
Discharge: HOME | End: 2019-07-05
Attending: INTERNAL MEDICINE
Payer: MEDICARE

## 2019-07-05 ENCOUNTER — HOSPITAL ENCOUNTER (OUTPATIENT)
Dept: LAB | Facility: HOSPITAL | Age: 74
Discharge: HOME | End: 2019-07-05
Attending: INTERNAL MEDICINE
Payer: MEDICARE

## 2019-07-05 DIAGNOSIS — Z08 ROUTINE CANCER FOLLOW-UP VISIT: ICD-10-CM

## 2019-07-05 LAB
NT-PROBNP SERPL-MCNC: 61 PG/ML (ref ?–450)
TROPONIN I SERPL-MCNC: <0.045 NG/ML (ref ?–0.04)

## 2019-07-05 PROCEDURE — 36415 COLL VENOUS BLD VENIPUNCTURE: CPT | Performed by: INTERNAL MEDICINE

## 2019-07-05 PROCEDURE — 93306 TTE W/DOPPLER COMPLETE: CPT | Performed by: INTERNAL MEDICINE

## 2019-07-05 PROCEDURE — 84484 ASSAY OF TROPONIN QUANT: CPT | Performed by: INTERNAL MEDICINE

## 2019-07-05 PROCEDURE — 0399T MYOCARDIAL STRAIN IMAGING QUANTITATIVE ASSESSMENT OF MYOCARDIAL MECH: CPT | Performed by: INTERNAL MEDICINE

## 2019-07-05 PROCEDURE — 83880 ASSAY OF NATRIURETIC PEPTIDE: CPT | Performed by: INTERNAL MEDICINE

## 2019-07-08 DIAGNOSIS — Z08 ROUTINE CANCER FOLLOW-UP VISIT: ICD-10-CM

## 2019-07-08 RX ORDER — LISINOPRIL 2.5 MG/1
TABLET ORAL
Qty: 30 TABLET | Refills: 6 | Status: SHIPPED | OUTPATIENT
Start: 2019-07-08 | End: 2019-12-29 | Stop reason: SDUPTHER

## 2019-07-08 RX ORDER — LISINOPRIL 5 MG/1
TABLET ORAL
Qty: 30 TABLET | Refills: 6 | Status: SHIPPED | OUTPATIENT
Start: 2019-07-08 | End: 2019-12-29 | Stop reason: SDUPTHER

## 2019-07-16 PROBLEM — I10 ESSENTIAL HYPERTENSION: Status: ACTIVE | Noted: 2017-04-20

## 2019-07-16 PROBLEM — Z51.81 ENCOUNTER FOR MONITORING CARDIOTOXIC DRUG THERAPY: Status: ACTIVE | Noted: 2017-04-20

## 2019-07-16 PROBLEM — J44.9 COPD (CHRONIC OBSTRUCTIVE PULMONARY DISEASE) (CMD): Status: ACTIVE | Noted: 2017-04-20

## 2019-07-16 PROBLEM — Z79.899 ENCOUNTER FOR MONITORING CARDIOTOXIC DRUG THERAPY: Status: ACTIVE | Noted: 2017-04-20

## 2019-07-16 PROBLEM — E78.00 PURE HYPERCHOLESTEROLEMIA: Status: ACTIVE | Noted: 2017-04-20

## 2019-07-16 RX ORDER — ALBUTEROL SULFATE 90 UG/1
AEROSOL, METERED RESPIRATORY (INHALATION)
COMMUNITY

## 2019-07-16 RX ORDER — SODIUM, POTASSIUM,MAG SULFATES 17.5-3.13G
SOLUTION, RECONSTITUTED, ORAL ORAL
COMMUNITY
Start: 2019-04-30 | End: 2019-08-19 | Stop reason: ALTCHOICE

## 2019-07-16 RX ORDER — METOPROLOL SUCCINATE 25 MG/1
12.5 TABLET, EXTENDED RELEASE ORAL
COMMUNITY
End: 2019-07-17 | Stop reason: SDUPTHER

## 2019-07-16 RX ORDER — MULTIVIT-MIN/IRON FUM/FOLIC AC 7.5 MG-4
1 TABLET ORAL
COMMUNITY
End: 2020-01-01 | Stop reason: ALTCHOICE

## 2019-07-16 RX ORDER — IBUPROFEN 200 MG
200 TABLET ORAL EVERY 8 HOURS PRN
COMMUNITY

## 2019-07-16 RX ORDER — ATORVASTATIN CALCIUM 10 MG/1
10 TABLET, FILM COATED ORAL DAILY
COMMUNITY
End: 2020-01-22 | Stop reason: SDUPTHER

## 2019-07-16 RX ORDER — PANTOPRAZOLE SODIUM 40 MG/1
TABLET, DELAYED RELEASE ORAL
COMMUNITY
Start: 2017-07-14

## 2019-07-17 ENCOUNTER — NURSE ONLY (OUTPATIENT)
Dept: HEMATOLOGY/ONCOLOGY | Facility: HOSPITAL | Age: 74
End: 2019-07-17
Attending: INTERNAL MEDICINE
Payer: MEDICARE

## 2019-07-17 ENCOUNTER — OFFICE VISIT (OUTPATIENT)
Dept: CARDIOLOGY | Age: 74
End: 2019-07-17

## 2019-07-17 DIAGNOSIS — I10 ESSENTIAL HYPERTENSION: ICD-10-CM

## 2019-07-17 DIAGNOSIS — C77.3 BREAST CANCER METASTASIZED TO AXILLARY LYMPH NODE, RIGHT (HCC): ICD-10-CM

## 2019-07-17 DIAGNOSIS — C50.411 MALIGNANT NEOPLASM OF UPPER-OUTER QUADRANT OF RIGHT BREAST IN FEMALE, ESTROGEN RECEPTOR POSITIVE (HCC): ICD-10-CM

## 2019-07-17 DIAGNOSIS — Z17.0 MALIGNANT NEOPLASM OF UPPER-OUTER QUADRANT OF RIGHT BREAST IN FEMALE, ESTROGEN RECEPTOR POSITIVE (HCC): ICD-10-CM

## 2019-07-17 DIAGNOSIS — C50.911 BREAST CANCER METASTASIZED TO AXILLARY LYMPH NODE, RIGHT (HCC): ICD-10-CM

## 2019-07-17 DIAGNOSIS — Z51.81 ENCOUNTER FOR MONITORING CARDIOTOXIC DRUG THERAPY: Primary | ICD-10-CM

## 2019-07-17 DIAGNOSIS — D50.0 IRON DEFICIENCY ANEMIA SECONDARY TO BLOOD LOSS (CHRONIC): ICD-10-CM

## 2019-07-17 DIAGNOSIS — J43.8 OTHER EMPHYSEMA (HCC): ICD-10-CM

## 2019-07-17 DIAGNOSIS — Z79.899 ENCOUNTER FOR MONITORING CARDIOTOXIC DRUG THERAPY: Primary | ICD-10-CM

## 2019-07-17 LAB
BASOPHILS # BLD AUTO: 0.06 X10(3) UL (ref 0–0.2)
BASOPHILS NFR BLD AUTO: 0.7 %
DEPRECATED RDW RBC AUTO: 49.2 FL (ref 35.1–46.3)
EOSINOPHIL # BLD AUTO: 0.18 X10(3) UL (ref 0–0.7)
EOSINOPHIL NFR BLD AUTO: 2 %
ERYTHROCYTE [DISTWIDTH] IN BLOOD BY AUTOMATED COUNT: 17.9 % (ref 11–15)
HCT VFR BLD AUTO: 40.3 % (ref 35–48)
HGB BLD-MCNC: 13.3 G/DL (ref 12–16)
IMM GRANULOCYTES # BLD AUTO: 0.03 X10(3) UL (ref 0–1)
IMM GRANULOCYTES NFR BLD: 0.3 %
LYMPHOCYTES # BLD AUTO: 2.96 X10(3) UL (ref 1–4)
LYMPHOCYTES NFR BLD AUTO: 32.7 %
MCH RBC QN AUTO: 29.4 PG (ref 26–34)
MCHC RBC AUTO-ENTMCNC: 33 G/DL (ref 31–37)
MCV RBC AUTO: 89 FL (ref 80–100)
MONOCYTES # BLD AUTO: 0.57 X10(3) UL (ref 0.1–1)
MONOCYTES NFR BLD AUTO: 6.3 %
NEUTROPHILS # BLD AUTO: 5.25 X10 (3) UL (ref 1.5–7.7)
NEUTROPHILS # BLD AUTO: 5.25 X10(3) UL (ref 1.5–7.7)
NEUTROPHILS NFR BLD AUTO: 58 %
PLATELET # BLD AUTO: 344 10(3)UL (ref 150–450)
RBC # BLD AUTO: 4.53 X10(6)UL (ref 3.8–5.3)
WBC # BLD AUTO: 9.1 X10(3) UL (ref 4–11)

## 2019-07-17 PROCEDURE — 36415 COLL VENOUS BLD VENIPUNCTURE: CPT

## 2019-07-17 PROCEDURE — 99214 OFFICE O/P EST MOD 30 MIN: CPT | Performed by: NURSE PRACTITIONER

## 2019-07-17 PROCEDURE — 85025 COMPLETE CBC W/AUTO DIFF WBC: CPT

## 2019-07-17 RX ORDER — METOPROLOL SUCCINATE 25 MG/1
25 TABLET, EXTENDED RELEASE ORAL DAILY
Qty: 30 TABLET | Refills: 6 | Status: SHIPPED | OUTPATIENT
Start: 2019-07-17 | End: 2020-01-22 | Stop reason: SDUPTHER

## 2019-07-17 RX ORDER — FLUTICASONE FUROATE AND VILANTEROL 100; 25 UG/1; UG/1
1 POWDER RESPIRATORY (INHALATION) DAILY
COMMUNITY

## 2019-07-17 SDOH — HEALTH STABILITY: PHYSICAL HEALTH: ON AVERAGE, HOW MANY DAYS PER WEEK DO YOU ENGAGE IN MODERATE TO STRENUOUS EXERCISE (LIKE A BRISK WALK)?: 0 DAYS

## 2019-07-17 SDOH — HEALTH STABILITY: PHYSICAL HEALTH: ON AVERAGE, HOW MANY MINUTES DO YOU ENGAGE IN EXERCISE AT THIS LEVEL?: 0 MIN

## 2019-07-17 ASSESSMENT — ENCOUNTER SYMPTOMS
RESPIRATORY NEGATIVE: 1
GASTROINTESTINAL NEGATIVE: 1
NEUROLOGICAL NEGATIVE: 1
BRUISES/BLEEDS EASILY: 1
CONSTITUTIONAL NEGATIVE: 1
BACK PAIN: 1
EYES NEGATIVE: 1

## 2019-07-17 ASSESSMENT — PATIENT HEALTH QUESTIONNAIRE - PHQ9
1. LITTLE INTEREST OR PLEASURE IN DOING THINGS: NOT AT ALL
2. FEELING DOWN, DEPRESSED OR HOPELESS: NOT AT ALL
SUM OF ALL RESPONSES TO PHQ9 QUESTIONS 1 AND 2: 0
SUM OF ALL RESPONSES TO PHQ9 QUESTIONS 1 AND 2: 0

## 2019-07-17 ASSESSMENT — PAIN SCALES - GENERAL: PAINLEVEL: 3-4

## 2019-07-31 ENCOUNTER — SNF/IP PROF CHARGE ONLY (OUTPATIENT)
Dept: HEMATOLOGY/ONCOLOGY | Facility: HOSPITAL | Age: 74
End: 2019-07-31

## 2019-07-31 DIAGNOSIS — C77.3 CARCINOMA OF BREAST METASTATIC TO AXILLARY LYMPH NODE, UNSPECIFIED LATERALITY (HCC): ICD-10-CM

## 2019-07-31 DIAGNOSIS — C50.919 CARCINOMA OF BREAST METASTATIC TO AXILLARY LYMPH NODE, UNSPECIFIED LATERALITY (HCC): ICD-10-CM

## 2019-07-31 PROCEDURE — G9678 ONCOLOGY CARE MODEL SERVICE: HCPCS | Performed by: INTERNAL MEDICINE

## 2019-08-06 ENCOUNTER — TELEPHONE (OUTPATIENT)
Dept: HEMATOLOGY/ONCOLOGY | Facility: HOSPITAL | Age: 74
End: 2019-08-06

## 2019-08-08 ENCOUNTER — TELEPHONE (OUTPATIENT)
Dept: HEMATOLOGY/ONCOLOGY | Facility: HOSPITAL | Age: 74
End: 2019-08-08

## 2019-08-08 RX ORDER — PANTOPRAZOLE SODIUM 40 MG/1
TABLET, DELAYED RELEASE ORAL
Qty: 90 TABLET | Refills: 3 | Status: SHIPPED | OUTPATIENT
Start: 2019-08-08 | End: 2019-09-11

## 2019-08-19 ENCOUNTER — OFFICE VISIT (OUTPATIENT)
Dept: HEMATOLOGY/ONCOLOGY | Facility: HOSPITAL | Age: 74
End: 2019-08-19
Attending: INTERNAL MEDICINE
Payer: MEDICARE

## 2019-08-19 ENCOUNTER — OFFICE VISIT (OUTPATIENT)
Dept: CARDIOLOGY | Age: 74
End: 2019-08-19

## 2019-08-19 VITALS — HEART RATE: 61 BPM | HEIGHT: 61 IN | WEIGHT: 119 LBS | BODY MASS INDEX: 22.47 KG/M2

## 2019-08-19 VITALS
DIASTOLIC BLOOD PRESSURE: 69 MMHG | TEMPERATURE: 98 F | WEIGHT: 119 LBS | SYSTOLIC BLOOD PRESSURE: 107 MMHG | BODY MASS INDEX: 22.47 KG/M2 | RESPIRATION RATE: 18 BRPM | OXYGEN SATURATION: 97 % | HEART RATE: 78 BPM | HEIGHT: 60.98 IN

## 2019-08-19 DIAGNOSIS — Z51.81 ENCOUNTER FOR MONITORING CARDIOTOXIC DRUG THERAPY: ICD-10-CM

## 2019-08-19 DIAGNOSIS — J43.8 OTHER EMPHYSEMA (HCC): ICD-10-CM

## 2019-08-19 DIAGNOSIS — Z79.899 ENCOUNTER FOR MONITORING CARDIOTOXIC DRUG THERAPY: ICD-10-CM

## 2019-08-19 DIAGNOSIS — C50.911 BREAST CANCER METASTASIZED TO AXILLARY LYMPH NODE, RIGHT (HCC): ICD-10-CM

## 2019-08-19 DIAGNOSIS — I10 ESSENTIAL HYPERTENSION: Primary | ICD-10-CM

## 2019-08-19 DIAGNOSIS — D50.0 IRON DEFICIENCY ANEMIA SECONDARY TO BLOOD LOSS (CHRONIC): ICD-10-CM

## 2019-08-19 DIAGNOSIS — R06.09 DYSPNEA ON EXERTION: ICD-10-CM

## 2019-08-19 DIAGNOSIS — E78.00 PURE HYPERCHOLESTEROLEMIA: ICD-10-CM

## 2019-08-19 DIAGNOSIS — C77.3 BREAST CANCER METASTASIZED TO AXILLARY LYMPH NODE, RIGHT (HCC): ICD-10-CM

## 2019-08-19 DIAGNOSIS — C50.411 MALIGNANT NEOPLASM OF UPPER-OUTER QUADRANT OF RIGHT BREAST IN FEMALE, ESTROGEN RECEPTOR POSITIVE (HCC): ICD-10-CM

## 2019-08-19 DIAGNOSIS — Z17.0 MALIGNANT NEOPLASM OF UPPER-OUTER QUADRANT OF RIGHT BREAST IN FEMALE, ESTROGEN RECEPTOR POSITIVE (HCC): ICD-10-CM

## 2019-08-19 LAB
BASOPHILS # BLD AUTO: 0.04 X10(3) UL (ref 0–0.2)
BASOPHILS NFR BLD AUTO: 0.5 %
DEPRECATED RDW RBC AUTO: 46.9 FL (ref 35.1–46.3)
EOSINOPHIL # BLD AUTO: 0.19 X10(3) UL (ref 0–0.7)
EOSINOPHIL NFR BLD AUTO: 2.3 %
ERYTHROCYTE [DISTWIDTH] IN BLOOD BY AUTOMATED COUNT: 14.1 % (ref 11–15)
HCT VFR BLD AUTO: 40.4 % (ref 35–48)
HGB BLD-MCNC: 13.5 G/DL (ref 12–16)
IMM GRANULOCYTES # BLD AUTO: 0.02 X10(3) UL (ref 0–1)
IMM GRANULOCYTES NFR BLD: 0.2 %
LYMPHOCYTES # BLD AUTO: 2.87 X10(3) UL (ref 1–4)
LYMPHOCYTES NFR BLD AUTO: 35.3 %
MCH RBC QN AUTO: 31 PG (ref 26–34)
MCHC RBC AUTO-ENTMCNC: 33.4 G/DL (ref 31–37)
MCV RBC AUTO: 92.9 FL (ref 80–100)
MONOCYTES # BLD AUTO: 0.55 X10(3) UL (ref 0.1–1)
MONOCYTES NFR BLD AUTO: 6.8 %
NEUTROPHILS # BLD AUTO: 4.45 X10 (3) UL (ref 1.5–7.7)
NEUTROPHILS # BLD AUTO: 4.45 X10(3) UL (ref 1.5–7.7)
NEUTROPHILS NFR BLD AUTO: 54.9 %
PLATELET # BLD AUTO: 286 10(3)UL (ref 150–450)
RBC # BLD AUTO: 4.35 X10(6)UL (ref 3.8–5.3)
WBC # BLD AUTO: 8.1 X10(3) UL (ref 4–11)

## 2019-08-19 PROCEDURE — 99213 OFFICE O/P EST LOW 20 MIN: CPT | Performed by: NURSE PRACTITIONER

## 2019-08-19 PROCEDURE — 99214 OFFICE O/P EST MOD 30 MIN: CPT | Performed by: INTERNAL MEDICINE

## 2019-08-19 RX ORDER — LETROZOLE 2.5 MG/1
2.5 TABLET, FILM COATED ORAL DAILY
COMMUNITY
End: 2020-03-12

## 2019-08-19 ASSESSMENT — ENCOUNTER SYMPTOMS
WEIGHT LOSS: 0
SUSPICIOUS LESIONS: 0
BRUISES/BLEEDS EASILY: 0
ALLERGIC/IMMUNOLOGIC COMMENTS: NO NEW FOOD ALLERGIES
HEMOPTYSIS: 0
WEIGHT GAIN: 0
FEVER: 0
CHILLS: 0
HEMATOCHEZIA: 0
COUGH: 0

## 2019-08-19 NOTE — PROGRESS NOTES
Cancer Center Progress Note    Problem List:      Patient Active Problem List:     De Quervain's syndrome (tenosynovitis)     Breast cancer metastasized to axillary lymph node ([address]     Other specified counseling     Dyspnea on exertion     COPD (chronic o All other systems were negative.     PMH/PSH:  Past Medical History:   Diagnosis Date   • Acute, but ill-defined, cerebrovascular disease    • Anemia    • Back problem    • Belching    • Bloating    • Breast cancer ([facility] 2017   • Chest pain on exertion    • Allergies    Medications:    letrozole 2.5 MG Oral Tab Take 2.5 mg by mouth daily.  Disp:  Rfl:    Pantoprazole Sodium 40 MG Oral Tab EC TAKE 1 TABLET(40 MG) BY MOUTH EVERY MORNING BEFORE BREAKFAST Disp: 90 tablet Rfl: 3   ergocalciferol 81438 units Oral Ca cervical, supraclavicular, or axillary regions. Psychiatric: The patient's mood is calm and appropriate for this visit.       Labs reviewed at this visit:     Lab Results   Component Value Date    WBC 9.1 07/17/2019    RBC 4.53 07/17/2019    HGB 13.3 07/17 visible focal wall thickening, lesion, or calculus. PELVIC NODES:  No adenopathy. PELVIC ORGANS:  No visible mass. Pelvic organs appropriate for patient age. BONES:  No bony lesion or fracture.     LUNG BASES:  Areas of fibrotic change noted at th

## 2019-08-23 ENCOUNTER — APPOINTMENT (OUTPATIENT)
Dept: CARDIOLOGY | Age: 74
End: 2019-08-23

## 2019-08-31 ENCOUNTER — SNF/IP PROF CHARGE ONLY (OUTPATIENT)
Dept: HEMATOLOGY/ONCOLOGY | Facility: HOSPITAL | Age: 74
End: 2019-08-31

## 2019-08-31 DIAGNOSIS — C77.3 CARCINOMA OF BREAST METASTATIC TO AXILLARY LYMPH NODE, UNSPECIFIED LATERALITY (HCC): ICD-10-CM

## 2019-08-31 DIAGNOSIS — C50.919 CARCINOMA OF BREAST METASTATIC TO AXILLARY LYMPH NODE, UNSPECIFIED LATERALITY (HCC): ICD-10-CM

## 2019-08-31 PROCEDURE — G9678 ONCOLOGY CARE MODEL SERVICE: HCPCS | Performed by: INTERNAL MEDICINE

## 2019-09-09 ENCOUNTER — HOSPITAL ENCOUNTER (OUTPATIENT)
Dept: MAMMOGRAPHY | Facility: HOSPITAL | Age: 74
Discharge: HOME | End: 2019-09-09
Attending: INTERNAL MEDICINE
Payer: MEDICARE

## 2019-09-09 DIAGNOSIS — C77.3 BREAST CANCER METASTASIZED TO AXILLARY LYMPH NODE, RIGHT (HCC): ICD-10-CM

## 2019-09-09 DIAGNOSIS — Z17.0 MALIGNANT NEOPLASM OF UPPER-OUTER QUADRANT OF RIGHT BREAST IN FEMALE, ESTROGEN RECEPTOR POSITIVE (HCC): ICD-10-CM

## 2019-09-09 DIAGNOSIS — C50.911 BREAST CANCER METASTASIZED TO AXILLARY LYMPH NODE, RIGHT (HCC): ICD-10-CM

## 2019-09-09 DIAGNOSIS — C50.411 MALIGNANT NEOPLASM OF UPPER-OUTER QUADRANT OF RIGHT BREAST IN FEMALE, ESTROGEN RECEPTOR POSITIVE (HCC): ICD-10-CM

## 2019-09-09 PROCEDURE — 77062 BREAST TOMOSYNTHESIS BI: CPT | Performed by: INTERNAL MEDICINE

## 2019-09-09 PROCEDURE — 77066 DX MAMMO INCL CAD BI: CPT | Performed by: INTERNAL MEDICINE

## 2019-09-11 RX ORDER — PANTOPRAZOLE SODIUM 40 MG/1
TABLET, DELAYED RELEASE ORAL
Qty: 90 TABLET | Refills: 3 | Status: SHIPPED | OUTPATIENT
Start: 2019-09-11

## 2019-09-16 RX ORDER — ERGOCALCIFEROL 1.25 MG/1
CAPSULE ORAL
Qty: 12 CAPSULE | Refills: 0 | Status: SHIPPED | OUTPATIENT
Start: 2019-09-16 | End: 2019-12-04

## 2019-09-30 ENCOUNTER — SNF/IP PROF CHARGE ONLY (OUTPATIENT)
Dept: HEMATOLOGY/ONCOLOGY | Facility: HOSPITAL | Age: 74
End: 2019-09-30

## 2019-09-30 DIAGNOSIS — C50.919 CARCINOMA OF BREAST METASTATIC TO AXILLARY LYMPH NODE, UNSPECIFIED LATERALITY (HCC): ICD-10-CM

## 2019-09-30 DIAGNOSIS — C77.3 CARCINOMA OF BREAST METASTATIC TO AXILLARY LYMPH NODE, UNSPECIFIED LATERALITY (HCC): ICD-10-CM

## 2019-09-30 PROCEDURE — G9678 ONCOLOGY CARE MODEL SERVICE: HCPCS | Performed by: INTERNAL MEDICINE

## 2019-10-23 ENCOUNTER — TELEPHONE (OUTPATIENT)
Dept: HEMATOLOGY/ONCOLOGY | Facility: HOSPITAL | Age: 74
End: 2019-10-23

## 2019-10-25 ENCOUNTER — RESEARCH ENCOUNTER (OUTPATIENT)
Dept: HEMATOLOGY/ONCOLOGY | Facility: HOSPITAL | Age: 74
End: 2019-10-25

## 2019-10-25 NOTE — PROGRESS NOTES
STUDY: NSABP- 46  ID # [id number]    Spoke with patient on phone today. Denies complaints and doing well. She is due for 24 month follow up on study. States she is due to see  in November and her cardiac echo in December 2019.  Mammogram was comp

## 2019-11-25 ENCOUNTER — APPOINTMENT (OUTPATIENT)
Dept: HEMATOLOGY/ONCOLOGY | Facility: HOSPITAL | Age: 74
End: 2019-11-25
Attending: INTERNAL MEDICINE
Payer: MEDICARE

## 2019-11-25 ENCOUNTER — TELEPHONE (OUTPATIENT)
Dept: HEMATOLOGY/ONCOLOGY | Facility: HOSPITAL | Age: 74
End: 2019-11-25

## 2019-11-25 DIAGNOSIS — M81.6 LOCALIZED OSTEOPOROSIS WITHOUT CURRENT PATHOLOGICAL FRACTURE: Primary | ICD-10-CM

## 2019-11-25 DIAGNOSIS — C50.911 BREAST CANCER METASTASIZED TO AXILLARY LYMPH NODE, RIGHT (HCC): ICD-10-CM

## 2019-11-25 DIAGNOSIS — C77.3 BREAST CANCER METASTASIZED TO AXILLARY LYMPH NODE, RIGHT (HCC): ICD-10-CM

## 2019-11-25 NOTE — TELEPHONE ENCOUNTER
Discussed with Eduardo Schwartz RN - give last dose of Prolia for this year, ok to draw CBC and Ferritin tomorrow along with Prolia labs, and page Dr. Bharat Hernandez with CBC and Ferritin results to determine if patient will also need iron infusion the same day.    Patient aw

## 2019-11-26 ENCOUNTER — OFFICE VISIT (OUTPATIENT)
Dept: HEMATOLOGY/ONCOLOGY | Facility: HOSPITAL | Age: 74
End: 2019-11-26
Attending: INTERNAL MEDICINE
Payer: MEDICARE

## 2019-11-26 VITALS
TEMPERATURE: 98 F | WEIGHT: 118 LBS | BODY MASS INDEX: 22.28 KG/M2 | HEIGHT: 60.98 IN | SYSTOLIC BLOOD PRESSURE: 120 MMHG | OXYGEN SATURATION: 98 % | HEART RATE: 62 BPM | DIASTOLIC BLOOD PRESSURE: 76 MMHG | RESPIRATION RATE: 18 BRPM

## 2019-11-26 DIAGNOSIS — J43.8 OTHER EMPHYSEMA (HCC): ICD-10-CM

## 2019-11-26 DIAGNOSIS — Z17.0 MALIGNANT NEOPLASM OF UPPER-OUTER QUADRANT OF RIGHT BREAST IN FEMALE, ESTROGEN RECEPTOR POSITIVE (HCC): Primary | ICD-10-CM

## 2019-11-26 DIAGNOSIS — M81.6 LOCALIZED OSTEOPOROSIS WITHOUT CURRENT PATHOLOGICAL FRACTURE: ICD-10-CM

## 2019-11-26 DIAGNOSIS — C50.911 BREAST CANCER METASTASIZED TO AXILLARY LYMPH NODE, RIGHT (HCC): ICD-10-CM

## 2019-11-26 DIAGNOSIS — C50.411 MALIGNANT NEOPLASM OF UPPER-OUTER QUADRANT OF RIGHT BREAST IN FEMALE, ESTROGEN RECEPTOR POSITIVE (HCC): Primary | ICD-10-CM

## 2019-11-26 DIAGNOSIS — D50.0 IRON DEFICIENCY ANEMIA SECONDARY TO BLOOD LOSS (CHRONIC): ICD-10-CM

## 2019-11-26 DIAGNOSIS — C77.3 BREAST CANCER METASTASIZED TO AXILLARY LYMPH NODE, RIGHT (HCC): ICD-10-CM

## 2019-11-26 PROCEDURE — 85025 COMPLETE CBC W/AUTO DIFF WBC: CPT

## 2019-11-26 PROCEDURE — 82040 ASSAY OF SERUM ALBUMIN: CPT

## 2019-11-26 PROCEDURE — 82310 ASSAY OF CALCIUM: CPT

## 2019-11-26 PROCEDURE — 82728 ASSAY OF FERRITIN: CPT

## 2019-11-26 PROCEDURE — 83735 ASSAY OF MAGNESIUM: CPT

## 2019-11-26 PROCEDURE — 96372 THER/PROPH/DIAG INJ SC/IM: CPT

## 2019-11-26 PROCEDURE — 36415 COLL VENOUS BLD VENIPUNCTURE: CPT

## 2019-11-26 PROCEDURE — 84100 ASSAY OF PHOSPHORUS: CPT

## 2019-11-26 PROCEDURE — 82565 ASSAY OF CREATININE: CPT

## 2019-11-26 NOTE — PROGRESS NOTES
Education Record    Learner:  Patient    Disease / Diagnosis: malignant neoplasm of upper outer quadrant of right breast     Barriers / Limitations:  None   Comments:    Method:  Brief focused   Comments:    General Topics:  Plan of care reviewed   Comment

## 2019-11-27 ENCOUNTER — TELEPHONE (OUTPATIENT)
Dept: HEMATOLOGY/ONCOLOGY | Facility: HOSPITAL | Age: 74
End: 2019-11-27

## 2019-11-27 NOTE — TELEPHONE ENCOUNTER
Patient's daughter was informed that the patient's  labs look good and she does not need any iron onfusion

## 2019-12-05 RX ORDER — ERGOCALCIFEROL 1.25 MG/1
CAPSULE ORAL
Qty: 12 CAPSULE | Refills: 0 | Status: SHIPPED | OUTPATIENT
Start: 2019-12-05 | End: 2020-03-02

## 2019-12-10 ENCOUNTER — APPOINTMENT (OUTPATIENT)
Dept: RADIATION ONCOLOGY | Facility: HOSPITAL | Age: 74
End: 2019-12-10
Attending: RADIOLOGY
Payer: MEDICARE

## 2019-12-19 ENCOUNTER — OFFICE VISIT (OUTPATIENT)
Dept: RADIATION ONCOLOGY | Facility: HOSPITAL | Age: 74
End: 2019-12-19
Attending: RADIOLOGY
Payer: MEDICARE

## 2019-12-19 VITALS
DIASTOLIC BLOOD PRESSURE: 78 MMHG | BODY MASS INDEX: 23.16 KG/M2 | HEIGHT: 60 IN | SYSTOLIC BLOOD PRESSURE: 99 MMHG | RESPIRATION RATE: 18 BRPM | HEART RATE: 74 BPM | WEIGHT: 118 LBS | TEMPERATURE: 97 F

## 2019-12-19 DIAGNOSIS — C77.3 CARCINOMA OF RIGHT BREAST METASTATIC TO AXILLARY LYMPH NODE (HCC): Primary | ICD-10-CM

## 2019-12-19 DIAGNOSIS — C50.911 CARCINOMA OF RIGHT BREAST METASTATIC TO AXILLARY LYMPH NODE (HCC): Primary | ICD-10-CM

## 2019-12-19 NOTE — PROGRESS NOTES
Pt seen in annual follow up with Dr Alexandru Valverde, having completed radiation to the R breast 12/11/17. Pt was enrolled in B51 radiation trial. Pt states she dropped her paperwork at [facility] radiation oncology dept. Mammogram ordered for August, 2020.  Tolerating th

## 2019-12-19 NOTE — PATIENT INSTRUCTIONS
Please call [phone] to make appointment in 1 year. Call in November for December appointment with Dr Calderon Johnson.

## 2019-12-20 NOTE — PROGRESS NOTES
[facility]    PATIENT'S NAME: Pepe Sanjay   RADIATION ONCOLOGIST: Roney Reese.  Ruiz Morrow MD   PATIENT ACCOUNT #: [de-identified] LOCATION: [address] [place] [address] RECORD #: [id number] YOB: 1945   FOLLOW-UP DATE: 12/19/2019       RADIAT completing on 12/11/2017. The patient presents today and continues to do extremely well. She denies any particular issues or complaints. She has occasional tingling and numbness in the area of the axillary surgery, but denies other issues or problems. scheduled for another set of mammograms to be done in August and will see Dr. Esteban Mcclure thereafter.   I would like to continue to see her periodically but feel comfortable seeing her on an annual followup schedule given her prognosis and the fact that she is fol

## 2019-12-30 RX ORDER — LISINOPRIL 2.5 MG/1
TABLET ORAL
Qty: 90 TABLET | Refills: 2 | Status: SHIPPED | OUTPATIENT
Start: 2019-12-30 | End: 2020-01-22 | Stop reason: SDUPTHER

## 2019-12-30 RX ORDER — LISINOPRIL 5 MG/1
TABLET ORAL
Qty: 90 TABLET | Refills: 2 | Status: SHIPPED | OUTPATIENT
Start: 2019-12-30 | End: 2020-01-22 | Stop reason: SDUPTHER

## 2020-01-06 ENCOUNTER — HOSPITAL ENCOUNTER (OUTPATIENT)
Dept: LAB | Facility: HOSPITAL | Age: 75
Discharge: HOME | End: 2020-01-06
Attending: INTERNAL MEDICINE
Payer: MEDICARE

## 2020-01-06 ENCOUNTER — HOSPITAL ENCOUNTER (OUTPATIENT)
Dept: CV DIAGNOSTICS | Facility: HOSPITAL | Age: 75
Discharge: HOME | End: 2020-01-06
Attending: INTERNAL MEDICINE
Payer: MEDICARE

## 2020-01-06 DIAGNOSIS — Z51.81 ENCOUNTER FOR MONITORING CARDIOTOXIC DRUG THERAPY: ICD-10-CM

## 2020-01-06 DIAGNOSIS — I10 HYPERTENSION, ESSENTIAL: ICD-10-CM

## 2020-01-06 DIAGNOSIS — Z79.899 ENCOUNTER FOR MONITORING CARDIOTOXIC DRUG THERAPY: ICD-10-CM

## 2020-01-06 LAB
NT-PROBNP SERPL-MCNC: 90 PG/ML (ref ?–450)
TROPONIN I SERPL-MCNC: <0.045 NG/ML (ref ?–0.04)

## 2020-01-06 PROCEDURE — 99024 POSTOP FOLLOW-UP VISIT: CPT | Performed by: INTERNAL MEDICINE

## 2020-01-06 PROCEDURE — 36415 COLL VENOUS BLD VENIPUNCTURE: CPT | Performed by: INTERNAL MEDICINE

## 2020-01-06 PROCEDURE — 83880 ASSAY OF NATRIURETIC PEPTIDE: CPT | Performed by: INTERNAL MEDICINE

## 2020-01-06 PROCEDURE — 84484 ASSAY OF TROPONIN QUANT: CPT | Performed by: INTERNAL MEDICINE

## 2020-01-21 RX ORDER — ERGOCALCIFEROL 1.25 MG/1
CAPSULE ORAL
COMMUNITY
Start: 2019-12-05 | End: 2020-08-31

## 2020-01-22 ENCOUNTER — OFFICE VISIT (OUTPATIENT)
Dept: CARDIOLOGY | Age: 75
End: 2020-01-22

## 2020-01-22 VITALS
WEIGHT: 120 LBS | HEIGHT: 61 IN | SYSTOLIC BLOOD PRESSURE: 108 MMHG | HEART RATE: 68 BPM | DIASTOLIC BLOOD PRESSURE: 64 MMHG | BODY MASS INDEX: 22.66 KG/M2

## 2020-01-22 DIAGNOSIS — Z51.81 ENCOUNTER FOR MONITORING CARDIOTOXIC DRUG THERAPY: Primary | ICD-10-CM

## 2020-01-22 DIAGNOSIS — E78.00 PURE HYPERCHOLESTEROLEMIA: ICD-10-CM

## 2020-01-22 DIAGNOSIS — R06.09 DYSPNEA ON EXERTION: ICD-10-CM

## 2020-01-22 DIAGNOSIS — Z79.899 ENCOUNTER FOR MONITORING CARDIOTOXIC DRUG THERAPY: Primary | ICD-10-CM

## 2020-01-22 DIAGNOSIS — I10 ESSENTIAL HYPERTENSION: ICD-10-CM

## 2020-01-22 PROCEDURE — 99214 OFFICE O/P EST MOD 30 MIN: CPT | Performed by: INTERNAL MEDICINE

## 2020-01-22 RX ORDER — ATORVASTATIN CALCIUM 10 MG/1
10 TABLET, FILM COATED ORAL DAILY
Qty: 90 TABLET | Refills: 3 | Status: SHIPPED | OUTPATIENT
Start: 2020-01-22

## 2020-01-22 RX ORDER — METOPROLOL SUCCINATE 25 MG/1
25 TABLET, EXTENDED RELEASE ORAL DAILY
Qty: 90 TABLET | Refills: 3 | Status: SHIPPED | OUTPATIENT
Start: 2020-01-22 | End: 2020-03-20

## 2020-01-22 RX ORDER — LISINOPRIL 5 MG/1
TABLET ORAL
Qty: 90 TABLET | Refills: 3 | Status: SHIPPED | OUTPATIENT
Start: 2020-01-22

## 2020-01-22 RX ORDER — LISINOPRIL 2.5 MG/1
TABLET ORAL
Qty: 90 TABLET | Refills: 3 | Status: SHIPPED | OUTPATIENT
Start: 2020-01-22

## 2020-01-22 SDOH — HEALTH STABILITY: MENTAL HEALTH: HOW MANY STANDARD DRINKS CONTAINING ALCOHOL DO YOU HAVE ON A TYPICAL DAY?: 1 OR 2

## 2020-01-22 SDOH — HEALTH STABILITY: PHYSICAL HEALTH: ON AVERAGE, HOW MANY DAYS PER WEEK DO YOU ENGAGE IN MODERATE TO STRENUOUS EXERCISE (LIKE A BRISK WALK)?: 0 DAYS

## 2020-01-22 SDOH — HEALTH STABILITY: MENTAL HEALTH: HOW OFTEN DO YOU HAVE A DRINK CONTAINING ALCOHOL?: 2-3 TIMES A WEEK

## 2020-01-22 SDOH — HEALTH STABILITY: PHYSICAL HEALTH: ON AVERAGE, HOW MANY MINUTES DO YOU ENGAGE IN EXERCISE AT THIS LEVEL?: 0 MIN

## 2020-01-22 ASSESSMENT — ENCOUNTER SYMPTOMS
COUGH: 0
FEVER: 0
BRUISES/BLEEDS EASILY: 0
HEMATOCHEZIA: 0
HEMOPTYSIS: 0
ALLERGIC/IMMUNOLOGIC COMMENTS: NO NEW FOOD ALLERGIES
WEIGHT GAIN: 0
CHILLS: 0
SUSPICIOUS LESIONS: 0
WEIGHT LOSS: 0

## 2020-01-22 ASSESSMENT — PATIENT HEALTH QUESTIONNAIRE - PHQ9
SUM OF ALL RESPONSES TO PHQ9 QUESTIONS 1 AND 2: 0
1. LITTLE INTEREST OR PLEASURE IN DOING THINGS: NOT AT ALL
SUM OF ALL RESPONSES TO PHQ9 QUESTIONS 1 AND 2: 0
2. FEELING DOWN, DEPRESSED OR HOPELESS: NOT AT ALL

## 2020-02-17 ENCOUNTER — OFFICE VISIT (OUTPATIENT)
Dept: HEMATOLOGY/ONCOLOGY | Facility: HOSPITAL | Age: 75
End: 2020-02-17
Attending: INTERNAL MEDICINE
Payer: MEDICARE

## 2020-02-17 VITALS
HEIGHT: 60 IN | DIASTOLIC BLOOD PRESSURE: 79 MMHG | TEMPERATURE: 98 F | SYSTOLIC BLOOD PRESSURE: 123 MMHG | WEIGHT: 118 LBS | HEART RATE: 67 BPM | BODY MASS INDEX: 23.16 KG/M2 | RESPIRATION RATE: 16 BRPM | OXYGEN SATURATION: 96 %

## 2020-02-17 DIAGNOSIS — Z17.0 MALIGNANT NEOPLASM OF UPPER-OUTER QUADRANT OF RIGHT BREAST IN FEMALE, ESTROGEN RECEPTOR POSITIVE (HCC): ICD-10-CM

## 2020-02-17 DIAGNOSIS — R12 HEARTBURN: Primary | ICD-10-CM

## 2020-02-17 DIAGNOSIS — C50.411 MALIGNANT NEOPLASM OF UPPER-OUTER QUADRANT OF RIGHT BREAST IN FEMALE, ESTROGEN RECEPTOR POSITIVE (HCC): ICD-10-CM

## 2020-02-17 DIAGNOSIS — D50.0 IRON DEFICIENCY ANEMIA SECONDARY TO BLOOD LOSS (CHRONIC): ICD-10-CM

## 2020-02-17 LAB
BASOPHILS # BLD AUTO: 0.04 X10(3) UL (ref 0–0.2)
BASOPHILS NFR BLD AUTO: 0.5 %
DEPRECATED RDW RBC AUTO: 47.3 FL (ref 35.1–46.3)
EOSINOPHIL # BLD AUTO: 0.17 X10(3) UL (ref 0–0.7)
EOSINOPHIL NFR BLD AUTO: 2 %
ERYTHROCYTE [DISTWIDTH] IN BLOOD BY AUTOMATED COUNT: 14 % (ref 11–15)
HCT VFR BLD AUTO: 39 % (ref 35–48)
HGB BLD-MCNC: 13.2 G/DL (ref 12–16)
IMM GRANULOCYTES # BLD AUTO: 0.01 X10(3) UL (ref 0–1)
IMM GRANULOCYTES NFR BLD: 0.1 %
LYMPHOCYTES # BLD AUTO: 2.6 X10(3) UL (ref 1–4)
LYMPHOCYTES NFR BLD AUTO: 31.2 %
MCH RBC QN AUTO: 31.4 PG (ref 26–34)
MCHC RBC AUTO-ENTMCNC: 33.8 G/DL (ref 31–37)
MCV RBC AUTO: 92.9 FL (ref 80–100)
MONOCYTES # BLD AUTO: 0.66 X10(3) UL (ref 0.1–1)
MONOCYTES NFR BLD AUTO: 7.9 %
NEUTROPHILS # BLD AUTO: 4.85 X10 (3) UL (ref 1.5–7.7)
NEUTROPHILS # BLD AUTO: 4.85 X10(3) UL (ref 1.5–7.7)
NEUTROPHILS NFR BLD AUTO: 58.3 %
PLATELET # BLD AUTO: 295 10(3)UL (ref 150–450)
RBC # BLD AUTO: 4.2 X10(6)UL (ref 3.8–5.3)
WBC # BLD AUTO: 8.3 X10(3) UL (ref 4–11)

## 2020-02-17 PROCEDURE — 99214 OFFICE O/P EST MOD 30 MIN: CPT | Performed by: INTERNAL MEDICINE

## 2020-02-17 NOTE — PROGRESS NOTES
Cancer Center Progress Note    Problem List:      Patient Active Problem List:     De Quervain's syndrome (tenosynovitis)     Breast cancer metastasized to axillary lymph node ([address]     Other specified counseling     Dyspnea on exertion     COPD (chronic o hematuria  Neurological: Negative for headaches, dizziness, speech problems, gait problems and focal weakness. Psychiatric: The patient's mood was calm and appropriate for this visit. The pertinent positives and negatives were described.  All other system Family History Reviewed:  Family History   Problem Relation Age of Onset   • Other (multiple myeloma) Sister    • Breast Cancer Self    • Stroke Father    • Hypertension Mother    • Stroke Brother        Allergies:     No Known Allergies    Medicatio wheezes. Cardiovascular: Regular rate and rhythm. No murmurs. Gastrointestinal: Soft, non tender with good bowel sounds. Musculoskeletal: No edema. No calf tenderness. Neurological: Grossly intact without focal motor or sensory deficit.   Skin: No suspi left ventricular filling pattern, with concomitant abnormal relaxation and increased filling pressure - grade 2 diastolic dysfunction. 2. Ventricular septum: The outflow septum had a sigmoid appearance. 3. Mitral valve: Mildly calcified annulus.   4. Left focal consolidation is seen. OTHER:  Negative.       =====  CONCLUSION:  No free fluid is seen. No evidence of free intraperitoneal air. Normal-appearing appendix.  There is some questionable areas of bowel wall thickening involving the distal transverse

## 2020-03-02 RX ORDER — ERGOCALCIFEROL 1.25 MG/1
CAPSULE ORAL
Qty: 12 CAPSULE | Refills: 0 | Status: SHIPPED | OUTPATIENT
Start: 2020-03-02 | End: 2020-06-01

## 2020-03-12 RX ORDER — LETROZOLE 2.5 MG/1
TABLET, FILM COATED ORAL
Qty: 90 TABLET | Refills: 0 | Status: SHIPPED | OUTPATIENT
Start: 2020-03-12 | End: 2020-06-08

## 2020-03-20 RX ORDER — METOPROLOL SUCCINATE 25 MG/1
25 TABLET, EXTENDED RELEASE ORAL DAILY
Qty: 90 TABLET | Refills: 2 | Status: SHIPPED | OUTPATIENT
Start: 2020-03-20

## 2020-06-01 RX ORDER — ERGOCALCIFEROL 1.25 MG/1
CAPSULE ORAL
Qty: 12 CAPSULE | Refills: 0 | Status: SHIPPED | OUTPATIENT
Start: 2020-06-01 | End: 2021-02-17

## 2020-06-08 RX ORDER — LETROZOLE 2.5 MG/1
TABLET, FILM COATED ORAL
Qty: 90 TABLET | Refills: 0 | Status: SHIPPED | OUTPATIENT
Start: 2020-06-08 | End: 2020-09-09

## 2020-08-04 ENCOUNTER — HOSPITAL ENCOUNTER (OUTPATIENT)
Dept: LAB | Facility: HOSPITAL | Age: 75
Discharge: HOME | End: 2020-08-04
Attending: INTERNAL MEDICINE
Payer: MEDICARE

## 2020-08-04 ENCOUNTER — HOSPITAL ENCOUNTER (OUTPATIENT)
Dept: CV DIAGNOSTICS | Facility: HOSPITAL | Age: 75
Discharge: HOME | End: 2020-08-04
Attending: INTERNAL MEDICINE
Payer: MEDICARE

## 2020-08-04 DIAGNOSIS — R06.00 DYSPNEA ON EXERTION: ICD-10-CM

## 2020-08-04 DIAGNOSIS — Z79.899 ENCOUNTER FOR MONITORING CARDIOTOXIC DRUG THERAPY: ICD-10-CM

## 2020-08-04 DIAGNOSIS — I10 HYPERTENSION, ESSENTIAL: ICD-10-CM

## 2020-08-04 DIAGNOSIS — E78.00 PURE HYPERCHOLESTEROLEMIA: ICD-10-CM

## 2020-08-04 DIAGNOSIS — Z51.81 ENCOUNTER FOR MONITORING CARDIOTOXIC DRUG THERAPY: ICD-10-CM

## 2020-08-04 LAB
CHOLEST SMN-MCNC: 175 MG/DL (ref ?–200)
HDLC SERPL-MCNC: 90 MG/DL (ref 40–59)
LDLC SERPL CALC-MCNC: 64 MG/DL (ref ?–100)
NONHDLC SERPL-MCNC: 85 MG/DL (ref ?–130)
NT-PROBNP SERPL-MCNC: 99 PG/ML (ref ?–450)
PATIENT FASTING Y/N/NP: YES
TRIGL SERPL-MCNC: 103 MG/DL (ref 30–149)
TROPONIN I SERPL-MCNC: <0.045 NG/ML (ref ?–0.04)
VLDLC SERPL CALC-MCNC: 21 MG/DL (ref 0–30)

## 2020-08-04 PROCEDURE — 84484 ASSAY OF TROPONIN QUANT: CPT | Performed by: INTERNAL MEDICINE

## 2020-08-04 PROCEDURE — 36415 COLL VENOUS BLD VENIPUNCTURE: CPT | Performed by: INTERNAL MEDICINE

## 2020-08-04 PROCEDURE — 93306 TTE W/DOPPLER COMPLETE: CPT | Performed by: INTERNAL MEDICINE

## 2020-08-04 PROCEDURE — 80061 LIPID PANEL: CPT | Performed by: INTERNAL MEDICINE

## 2020-08-04 PROCEDURE — 83880 ASSAY OF NATRIURETIC PEPTIDE: CPT | Performed by: INTERNAL MEDICINE

## 2020-08-05 ENCOUNTER — TELEPHONE (OUTPATIENT)
Dept: CARDIOLOGY | Age: 75
End: 2020-08-05

## 2020-08-17 ENCOUNTER — OFFICE VISIT (OUTPATIENT)
Dept: HEMATOLOGY/ONCOLOGY | Facility: HOSPITAL | Age: 75
End: 2020-08-17
Attending: INTERNAL MEDICINE
Payer: MEDICARE

## 2020-08-17 VITALS
DIASTOLIC BLOOD PRESSURE: 79 MMHG | TEMPERATURE: 98 F | BODY MASS INDEX: 21.99 KG/M2 | OXYGEN SATURATION: 98 % | HEART RATE: 65 BPM | HEIGHT: 60 IN | RESPIRATION RATE: 16 BRPM | WEIGHT: 112 LBS | SYSTOLIC BLOOD PRESSURE: 126 MMHG

## 2020-08-17 DIAGNOSIS — C50.411 MALIGNANT NEOPLASM OF UPPER-OUTER QUADRANT OF RIGHT BREAST IN FEMALE, ESTROGEN RECEPTOR POSITIVE (HCC): Primary | ICD-10-CM

## 2020-08-17 DIAGNOSIS — Z17.0 MALIGNANT NEOPLASM OF UPPER-OUTER QUADRANT OF RIGHT BREAST IN FEMALE, ESTROGEN RECEPTOR POSITIVE (HCC): Primary | ICD-10-CM

## 2020-08-17 DIAGNOSIS — M81.6 LOCALIZED OSTEOPOROSIS WITHOUT CURRENT PATHOLOGICAL FRACTURE: ICD-10-CM

## 2020-08-17 DIAGNOSIS — E55.9 VITAMIN D DEFICIENCY: ICD-10-CM

## 2020-08-17 DIAGNOSIS — C50.911 BREAST CANCER METASTASIZED TO AXILLARY LYMPH NODE, RIGHT (HCC): ICD-10-CM

## 2020-08-17 DIAGNOSIS — D50.0 IRON DEFICIENCY ANEMIA SECONDARY TO BLOOD LOSS (CHRONIC): ICD-10-CM

## 2020-08-17 DIAGNOSIS — C77.3 BREAST CANCER METASTASIZED TO AXILLARY LYMPH NODE, RIGHT (HCC): ICD-10-CM

## 2020-08-17 PROCEDURE — 99214 OFFICE O/P EST MOD 30 MIN: CPT | Performed by: INTERNAL MEDICINE

## 2020-08-17 NOTE — PROGRESS NOTES
Cancer Center Progress Note    Problem List:      Patient Active Problem List:     De Quervain's syndrome (tenosynovitis)     Breast cancer metastasized to axillary lymph node ([address]     Other specified counseling     Dyspnea on exertion     COPD (chronic o and pruritus. Hematologic/lymphatic: Negative for easy bruising, bleeding, and lymphadenopathy. Musculoskeletal: Negative for myalgias, arthralgias, muscle weakness.   Genitourinary: Negative for dysuria or hematuria  Neurological: Negative for headaches, RADIATION RIGHT     • SENT LYMPH NODE BIOPSY     • TRANSFORAMINAL LUMBAR EPIDURAL STEROID INJECTION MULTIPLE LEVEL Left 11/27/2018    Performed by Bell Sterling MD at [facility] MAIN OR   • TUBAL LIGATION         Family History Reviewed:  Family History   Prob conjunctiva. HEENT:  Oropharynx is clear. Neck is supple. Respiratory: Clear to auscultation and percussion. No rales. No wheezes. Cardiovascular: Regular rate and rhythm. No murmurs. Gastrointestinal: Soft, non tender with good bowel sounds.   Musculo Systolic function was hyperdynamic. The estimated ejection fraction was 70-75%.  Features are consistent with a pseudonormal left ventricular filling pattern, with concomitant abnormal relaxation and increased filling pressure - grade 2 diastolic dysfunctio for patient age. BONES:  No bony lesion or fracture. LUNG BASES:  Areas of fibrotic change noted at the lung bases. No focal consolidation is seen. OTHER:  Negative.       =====  CONCLUSION:  No free fluid is seen.   No evidence of free intraperiton

## 2020-08-18 ENCOUNTER — TELEPHONE (OUTPATIENT)
Dept: HEMATOLOGY/ONCOLOGY | Facility: HOSPITAL | Age: 75
End: 2020-08-18

## 2020-08-18 DIAGNOSIS — E55.9 VITAMIN D DEFICIENCY: Primary | ICD-10-CM

## 2020-08-18 NOTE — TELEPHONE ENCOUNTER
MD Marielena Chong, RN   Caller: Unspecified (Today, 10:17 AM)             She is due for Prolia now. She should see me in 6 months.       Patient notified - is wondering if there is a cheaper drug for her the prolia charged her insurance 5,0

## 2020-08-18 NOTE — TELEPHONE ENCOUNTER
Dino Bruce MD  P Edw Brandon Chaudhary Rns             Her Vit D level is very high. Please advise the patient to stop the vitamin D and then to repeat the 25 hydroxy vit d level in 3 months. I put in this order.

## 2020-08-24 NOTE — TELEPHONE ENCOUNTER
Pt was informed that medicare cover 80% of prolia injection. Pt is ok with this.  PSR will call the patient to schedule

## 2020-08-26 ENCOUNTER — APPOINTMENT (OUTPATIENT)
Dept: CARDIOLOGY | Age: 75
End: 2020-08-26

## 2020-09-04 ENCOUNTER — APPOINTMENT (OUTPATIENT)
Dept: CARDIOLOGY | Age: 75
End: 2020-09-04

## 2020-09-04 ENCOUNTER — OFFICE VISIT (OUTPATIENT)
Dept: HEMATOLOGY/ONCOLOGY | Facility: HOSPITAL | Age: 75
End: 2020-09-04
Attending: INTERNAL MEDICINE
Payer: MEDICARE

## 2020-09-04 ENCOUNTER — OFFICE VISIT (OUTPATIENT)
Dept: CARDIOLOGY | Age: 75
End: 2020-09-04

## 2020-09-04 VITALS — SYSTOLIC BLOOD PRESSURE: 110 MMHG | DIASTOLIC BLOOD PRESSURE: 64 MMHG | HEART RATE: 72 BPM

## 2020-09-04 DIAGNOSIS — C50.911 BREAST CANCER METASTASIZED TO AXILLARY LYMPH NODE, RIGHT (HCC): ICD-10-CM

## 2020-09-04 DIAGNOSIS — Z08 ENCOUNTER FOR ROUTINE CANCER FOLLOW-UP: Primary | ICD-10-CM

## 2020-09-04 DIAGNOSIS — C77.3 BREAST CANCER METASTASIZED TO AXILLARY LYMPH NODE, RIGHT (HCC): ICD-10-CM

## 2020-09-04 DIAGNOSIS — M81.6 LOCALIZED OSTEOPOROSIS WITHOUT CURRENT PATHOLOGICAL FRACTURE: Primary | ICD-10-CM

## 2020-09-04 DIAGNOSIS — R06.00 DYSPNEA, UNSPECIFIED TYPE: ICD-10-CM

## 2020-09-04 LAB
ALBUMIN SERPL-MCNC: 3.6 G/DL (ref 3.4–5)
CALCIUM BLD-MCNC: 9.7 MG/DL (ref 8.5–10.1)
CREAT BLD-MCNC: 0.66 MG/DL (ref 0.55–1.02)
HAV IGM SER QL: 2 MG/DL (ref 1.6–2.6)
PHOSPHATE SERPL-MCNC: 3.5 MG/DL (ref 2.5–4.9)

## 2020-09-04 PROCEDURE — 83735 ASSAY OF MAGNESIUM: CPT

## 2020-09-04 PROCEDURE — 82565 ASSAY OF CREATININE: CPT

## 2020-09-04 PROCEDURE — 36415 COLL VENOUS BLD VENIPUNCTURE: CPT

## 2020-09-04 PROCEDURE — 84100 ASSAY OF PHOSPHORUS: CPT

## 2020-09-04 PROCEDURE — 99213 OFFICE O/P EST LOW 20 MIN: CPT | Performed by: NURSE PRACTITIONER

## 2020-09-04 PROCEDURE — 82040 ASSAY OF SERUM ALBUMIN: CPT

## 2020-09-04 PROCEDURE — 96372 THER/PROPH/DIAG INJ SC/IM: CPT

## 2020-09-04 PROCEDURE — 82310 ASSAY OF CALCIUM: CPT

## 2020-09-04 SDOH — HEALTH STABILITY: MENTAL HEALTH: HOW MANY STANDARD DRINKS CONTAINING ALCOHOL DO YOU HAVE ON A TYPICAL DAY?: 1 OR 2

## 2020-09-04 SDOH — HEALTH STABILITY: MENTAL HEALTH: HOW OFTEN DO YOU HAVE A DRINK CONTAINING ALCOHOL?: 2-3 TIMES A WEEK

## 2020-09-04 ASSESSMENT — ENCOUNTER SYMPTOMS
FEVER: 0
CHILLS: 0
HEMOPTYSIS: 0
WEIGHT LOSS: 0
SHORTNESS OF BREATH: 1
HEMATOCHEZIA: 0
WEIGHT GAIN: 0
SUSPICIOUS LESIONS: 0
BRUISES/BLEEDS EASILY: 0
COUGH: 0
ALLERGIC/IMMUNOLOGIC COMMENTS: NO NEW FOOD ALLERGIES

## 2020-09-04 ASSESSMENT — PATIENT HEALTH QUESTIONNAIRE - PHQ9
CLINICAL INTERPRETATION OF PHQ2 SCORE: NO FURTHER SCREENING NEEDED
SUM OF ALL RESPONSES TO PHQ9 QUESTIONS 1 AND 2: 0
SUM OF ALL RESPONSES TO PHQ9 QUESTIONS 1 AND 2: 0
2. FEELING DOWN, DEPRESSED OR HOPELESS: NOT AT ALL
CLINICAL INTERPRETATION OF PHQ9 SCORE: NO FURTHER SCREENING NEEDED
1. LITTLE INTEREST OR PLEASURE IN DOING THINGS: NOT AT ALL

## 2020-09-04 NOTE — PROGRESS NOTES
AM assessment completed. Patient resting in bed at this time with no complaints. Denies any chest pain at this time. Remains NSR/SB on the monitor. Trace pitting edema noted to bilateral lower extremities. Pedal pulses palpable. Denies any shortness of breath at this time. Lungs are diminished bilaterally. SATS 100% on 2L NC. She is A/Ox3 and is up in the room independently with a standard walker. She is primarily [place] speaking. Denies any pain with elimination. Skin remains warm, dry and intact. #20g SL to right AC, flushed and patent. Vital signs stable and AM medications provided. TV on and call light remains in reach.  Electronically signed by Becky Senior RN on 9/4/2020 at 10:24 AM Outpatient Oncology Care Plan  Problem list:  knowledge deficit    Problems related to:    disease/disease progression    Interventions:  provided general teaching    Expected outcomes:  understands plan of care    Progress towards outcome:  making progres

## 2020-09-04 NOTE — PROGRESS NOTES
Education Record    Learner:  Patient    Disease / Diagnosis:Localized osteoporosis    Barriers / Limitations:  None   Comments:    Method:  Discussion   Comments:    General Topics:  Plan of care reviewed   Comments:    Outcome:  Shows understanding   Com

## 2020-09-09 RX ORDER — LETROZOLE 2.5 MG/1
TABLET, FILM COATED ORAL
Qty: 90 TABLET | Refills: 3 | Status: SHIPPED | OUTPATIENT
Start: 2020-09-09 | End: 2021-08-24

## 2020-09-14 ENCOUNTER — HOSPITAL ENCOUNTER (OUTPATIENT)
Dept: MAMMOGRAPHY | Facility: HOSPITAL | Age: 75
Discharge: HOME | End: 2020-09-14
Attending: INTERNAL MEDICINE
Payer: MEDICARE

## 2020-09-14 DIAGNOSIS — Z17.0 MALIGNANT NEOPLASM OF UPPER-OUTER QUADRANT OF RIGHT BREAST IN FEMALE, ESTROGEN RECEPTOR POSITIVE (HCC): ICD-10-CM

## 2020-09-14 DIAGNOSIS — C50.411 MALIGNANT NEOPLASM OF UPPER-OUTER QUADRANT OF RIGHT BREAST IN FEMALE, ESTROGEN RECEPTOR POSITIVE (HCC): ICD-10-CM

## 2020-09-14 PROCEDURE — 77062 BREAST TOMOSYNTHESIS BI: CPT | Performed by: INTERNAL MEDICINE

## 2020-09-14 PROCEDURE — 77066 DX MAMMO INCL CAD BI: CPT | Performed by: INTERNAL MEDICINE

## 2021-01-07 NOTE — PATIENT INSTRUCTIONS
TODAY YOU RECEIVED 2 GRAMS OF IV MAGNESIUM OVER 1 HOUR FOR A MAGNESIUM LEVEL OF 1.6 (NORMAL IS 1.7-3.0)    Education Record    Learner:  Patient    Disease / Diagnosis:BREAST CANCER AND HERE TODAY WITH LOW MAGNESIUM    Barriers / Limitations:  None   Comme stated

## 2021-01-27 ENCOUNTER — HOSPITAL ENCOUNTER (OUTPATIENT)
Dept: CT IMAGING | Age: 76
Discharge: HOME | End: 2021-01-27
Attending: INTERNAL MEDICINE
Payer: MEDICARE

## 2021-01-27 DIAGNOSIS — Z87.891 PERSONAL HISTORY OF TOBACCO USE: ICD-10-CM

## 2021-01-27 PROCEDURE — 71271 CT THORAX LUNG CANCER SCR C-: CPT | Performed by: INTERNAL MEDICINE

## 2021-01-28 ENCOUNTER — IMMUNIZATION (OUTPATIENT)
Dept: LAB | Facility: HOSPITAL | Age: 76
End: 2021-01-28
Attending: EMERGENCY MEDICINE
Payer: MEDICARE

## 2021-01-28 DIAGNOSIS — Z23 NEED FOR VACCINATION: Primary | ICD-10-CM

## 2021-01-28 PROCEDURE — 0011A SARSCOV2 VAC 100MCG/0.5ML IM: CPT

## 2021-02-05 ENCOUNTER — TELEPHONE (OUTPATIENT)
Dept: CARDIOLOGY | Age: 76
End: 2021-02-05

## 2021-02-17 ENCOUNTER — OFFICE VISIT (OUTPATIENT)
Dept: HEMATOLOGY/ONCOLOGY | Facility: HOSPITAL | Age: 76
End: 2021-02-17
Attending: INTERNAL MEDICINE
Payer: MEDICARE

## 2021-02-17 VITALS
WEIGHT: 111.81 LBS | TEMPERATURE: 98 F | OXYGEN SATURATION: 100 % | HEIGHT: 60 IN | DIASTOLIC BLOOD PRESSURE: 92 MMHG | HEART RATE: 63 BPM | BODY MASS INDEX: 21.95 KG/M2 | RESPIRATION RATE: 16 BRPM | SYSTOLIC BLOOD PRESSURE: 154 MMHG

## 2021-02-17 DIAGNOSIS — D50.0 IRON DEFICIENCY ANEMIA SECONDARY TO BLOOD LOSS (CHRONIC): ICD-10-CM

## 2021-02-17 DIAGNOSIS — Z17.0 MALIGNANT NEOPLASM OF UPPER-OUTER QUADRANT OF RIGHT BREAST IN FEMALE, ESTROGEN RECEPTOR POSITIVE (HCC): ICD-10-CM

## 2021-02-17 DIAGNOSIS — C50.911 BREAST CANCER METASTASIZED TO AXILLARY LYMPH NODE, RIGHT (HCC): ICD-10-CM

## 2021-02-17 DIAGNOSIS — C50.411 MALIGNANT NEOPLASM OF UPPER-OUTER QUADRANT OF RIGHT BREAST IN FEMALE, ESTROGEN RECEPTOR POSITIVE (HCC): ICD-10-CM

## 2021-02-17 DIAGNOSIS — E55.9 VITAMIN D DEFICIENCY: ICD-10-CM

## 2021-02-17 DIAGNOSIS — C77.3 BREAST CANCER METASTASIZED TO AXILLARY LYMPH NODE, RIGHT (HCC): ICD-10-CM

## 2021-02-17 LAB
BASOPHILS # BLD AUTO: 0.06 X10(3) UL (ref 0–0.2)
BASOPHILS NFR BLD AUTO: 0.8 %
DEPRECATED HBV CORE AB SER IA-ACNC: 27.2 NG/ML
DEPRECATED RDW RBC AUTO: 63 FL (ref 35.1–46.3)
EOSINOPHIL # BLD AUTO: 0.14 X10(3) UL (ref 0–0.7)
EOSINOPHIL NFR BLD AUTO: 1.8 %
ERYTHROCYTE [DISTWIDTH] IN BLOOD BY AUTOMATED COUNT: 19.3 % (ref 11–15)
HCT VFR BLD AUTO: 42.3 %
HGB BLD-MCNC: 13.4 G/DL
IMM GRANULOCYTES # BLD AUTO: 0.01 X10(3) UL (ref 0–1)
IMM GRANULOCYTES NFR BLD: 0.1 %
LYMPHOCYTES # BLD AUTO: 2.38 X10(3) UL (ref 1–4)
LYMPHOCYTES NFR BLD AUTO: 30.3 %
MCH RBC QN AUTO: 28.5 PG (ref 26–34)
MCHC RBC AUTO-ENTMCNC: 31.7 G/DL (ref 31–37)
MCV RBC AUTO: 90 FL
MONOCYTES # BLD AUTO: 0.77 X10(3) UL (ref 0.1–1)
MONOCYTES NFR BLD AUTO: 9.8 %
NEUTROPHILS # BLD AUTO: 4.49 X10 (3) UL (ref 1.5–7.7)
NEUTROPHILS # BLD AUTO: 4.49 X10(3) UL (ref 1.5–7.7)
NEUTROPHILS NFR BLD AUTO: 57.2 %
PLATELET # BLD AUTO: 276 10(3)UL (ref 150–450)
RBC # BLD AUTO: 4.7 X10(6)UL
VIT D+METAB SERPL-MCNC: 69.6 NG/ML (ref 30–100)
WBC # BLD AUTO: 7.9 X10(3) UL (ref 4–11)

## 2021-02-17 PROCEDURE — 99214 OFFICE O/P EST MOD 30 MIN: CPT | Performed by: INTERNAL MEDICINE

## 2021-02-17 RX ORDER — FERROUS SULFATE TAB EC 324 MG (65 MG FE EQUIVALENT) 324 (65 FE) MG
TABLET DELAYED RESPONSE ORAL
COMMUNITY
End: 2021-08-18

## 2021-02-17 RX ORDER — CLOPIDOGREL BISULFATE 75 MG/1
75 TABLET ORAL DAILY
COMMUNITY

## 2021-02-17 NOTE — PROGRESS NOTES
Cancer Center Progress Note    Problem List:      Patient Active Problem List:     De Quervain's syndrome (tenosynovitis)     Breast cancer metastasized to axillary lymph node ([address]     Other specified counseling     Dyspnea on exertion     COPD (chronic o • High cholesterol    • History of blood transfusion    • Indigestion    • Irregular bowel habits    • Loss of appetite    • Nausea    • Night sweats    • Osteoporosis    • Personal history of antineoplastic chemotherapy 08/29/2017   • Scoliosis    • Qi Fluticasone Furoate-Vilanterol (BREO ELLIPTA IN), Inhale 1 puff into the lungs daily.   , Disp: , Rfl:           Vital Signs:      BP (!) 154/92 (BP Location: Right arm, Patient Position: Sitting, Cuff Size: adult)   Pulse 63   Temp 97.7 °F (36.5 °C) (Tempo CATEGORY b- There are scattered areas of fibroglandular density. FINDINGS:        Patient is status post right lumpectomy.   There is no suspicious asymmetry, mass, architectural distortion, or microcalcifications identified in either breast.

## 2021-02-25 ENCOUNTER — IMMUNIZATION (OUTPATIENT)
Dept: LAB | Facility: HOSPITAL | Age: 76
End: 2021-02-25
Attending: EMERGENCY MEDICINE
Payer: MEDICARE

## 2021-02-25 DIAGNOSIS — Z23 NEED FOR VACCINATION: Primary | ICD-10-CM

## 2021-02-25 PROCEDURE — 0012A SARSCOV2 VAC 100MCG/0.5ML IM: CPT

## 2021-03-01 ENCOUNTER — NURSE NAVIGATOR ENCOUNTER (OUTPATIENT)
Dept: HEMATOLOGY/ONCOLOGY | Facility: HOSPITAL | Age: 76
End: 2021-03-01

## 2021-03-01 NOTE — PROGRESS NOTES
Patient called me and stated she received her second Moderna COVID-19 vaccination this week and was concerned if it would be contraindicated with her Prolia injections she received every 6 months.     I contacted Dr. Kaleb Rios, patient's oncologist, he stated th

## 2021-03-05 ENCOUNTER — OFFICE VISIT (OUTPATIENT)
Dept: HEMATOLOGY/ONCOLOGY | Facility: HOSPITAL | Age: 76
End: 2021-03-05
Attending: INTERNAL MEDICINE
Payer: MEDICARE

## 2021-03-05 DIAGNOSIS — M81.6 LOCALIZED OSTEOPOROSIS WITHOUT CURRENT PATHOLOGICAL FRACTURE: ICD-10-CM

## 2021-03-05 DIAGNOSIS — C77.3 BREAST CANCER METASTASIZED TO AXILLARY LYMPH NODE, RIGHT (HCC): Primary | ICD-10-CM

## 2021-03-05 DIAGNOSIS — C50.911 BREAST CANCER METASTASIZED TO AXILLARY LYMPH NODE, RIGHT (HCC): Primary | ICD-10-CM

## 2021-03-05 LAB
ALBUMIN SERPL-MCNC: 3.6 G/DL (ref 3.4–5)
CALCIUM BLD-MCNC: 9.3 MG/DL (ref 8.5–10.1)
CREAT BLD-MCNC: 0.69 MG/DL
HAV IGM SER QL: 1.9 MG/DL (ref 1.6–2.6)
PHOSPHATE SERPL-MCNC: 4.2 MG/DL (ref 2.5–4.9)

## 2021-03-05 PROCEDURE — 83735 ASSAY OF MAGNESIUM: CPT

## 2021-03-05 PROCEDURE — 82565 ASSAY OF CREATININE: CPT

## 2021-03-05 PROCEDURE — 84100 ASSAY OF PHOSPHORUS: CPT

## 2021-03-05 PROCEDURE — 82040 ASSAY OF SERUM ALBUMIN: CPT

## 2021-03-05 PROCEDURE — 96372 THER/PROPH/DIAG INJ SC/IM: CPT

## 2021-03-05 PROCEDURE — 36415 COLL VENOUS BLD VENIPUNCTURE: CPT

## 2021-03-05 PROCEDURE — 82310 ASSAY OF CALCIUM: CPT

## 2021-03-05 NOTE — PROGRESS NOTES
Education Record    Learner:  Patient    Disease / Diagnosis:Osteoporosis    Barriers / Limitations:  None   Comments:    Method:  Discussion   Comments:    General Topics:  Plan of care reviewed   Comments:    Outcome:  Shows understanding   Comments:Pt t

## 2021-03-10 ENCOUNTER — APPOINTMENT (OUTPATIENT)
Dept: HEMATOLOGY/ONCOLOGY | Facility: HOSPITAL | Age: 76
End: 2021-03-10
Attending: INTERNAL MEDICINE
Payer: MEDICARE

## 2021-05-25 VITALS
HEART RATE: 64 BPM | SYSTOLIC BLOOD PRESSURE: 100 MMHG | WEIGHT: 120 LBS | BODY MASS INDEX: 22.66 KG/M2 | HEIGHT: 61 IN | DIASTOLIC BLOOD PRESSURE: 70 MMHG

## 2021-08-18 ENCOUNTER — OFFICE VISIT (OUTPATIENT)
Dept: HEMATOLOGY/ONCOLOGY | Facility: HOSPITAL | Age: 76
End: 2021-08-18
Attending: INTERNAL MEDICINE
Payer: MEDICARE

## 2021-08-18 VITALS
OXYGEN SATURATION: 98 % | WEIGHT: 119.63 LBS | DIASTOLIC BLOOD PRESSURE: 77 MMHG | BODY MASS INDEX: 23.49 KG/M2 | TEMPERATURE: 98 F | HEIGHT: 60 IN | RESPIRATION RATE: 16 BRPM | HEART RATE: 63 BPM | SYSTOLIC BLOOD PRESSURE: 144 MMHG

## 2021-08-18 DIAGNOSIS — C77.3 BREAST CANCER METASTASIZED TO AXILLARY LYMPH NODE, RIGHT (HCC): ICD-10-CM

## 2021-08-18 DIAGNOSIS — C50.411 MALIGNANT NEOPLASM OF UPPER-OUTER QUADRANT OF RIGHT BREAST IN FEMALE, ESTROGEN RECEPTOR POSITIVE (HCC): Primary | ICD-10-CM

## 2021-08-18 DIAGNOSIS — C50.911 BREAST CANCER METASTASIZED TO AXILLARY LYMPH NODE, RIGHT (HCC): ICD-10-CM

## 2021-08-18 DIAGNOSIS — Z17.0 MALIGNANT NEOPLASM OF UPPER-OUTER QUADRANT OF RIGHT BREAST IN FEMALE, ESTROGEN RECEPTOR POSITIVE (HCC): Primary | ICD-10-CM

## 2021-08-18 DIAGNOSIS — M81.6 LOCALIZED OSTEOPOROSIS WITHOUT CURRENT PATHOLOGICAL FRACTURE: ICD-10-CM

## 2021-08-18 DIAGNOSIS — D50.0 IRON DEFICIENCY ANEMIA SECONDARY TO BLOOD LOSS (CHRONIC): ICD-10-CM

## 2021-08-18 PROCEDURE — 99214 OFFICE O/P EST MOD 30 MIN: CPT | Performed by: INTERNAL MEDICINE

## 2021-08-18 RX ORDER — ATORVASTATIN CALCIUM 80 MG/1
80 TABLET, FILM COATED ORAL NIGHTLY
COMMUNITY

## 2021-08-18 RX ORDER — ROPINIROLE 0.5 MG/1
0.5 TABLET, FILM COATED ORAL 3 TIMES DAILY
COMMUNITY

## 2021-08-18 RX ORDER — ASPIRIN 81 MG/1
81 TABLET ORAL DAILY
COMMUNITY

## 2021-08-18 RX ORDER — METOPROLOL SUCCINATE 50 MG/1
50 TABLET, EXTENDED RELEASE ORAL DAILY
COMMUNITY

## 2021-08-18 NOTE — PROGRESS NOTES
Cancer Center Progress Note    Problem List:      Patient Active Problem List:     De Quervain's syndrome (tenosynovitis)     Breast cancer metastasized to axillary lymph node ([address]     Other specified counseling     Dyspnea on exertion     COPD (chronic o reflux    • Exposure to medical diagnostic radiation    • Fatigue    • Flatulence/gas pain/belching    • Hearing loss    • High blood pressure    • High cholesterol    • History of blood transfusion    • Indigestion    • Irregular bowel habits    • Loss of 90 tablet, Rfl: 3  SPIRIVA RESPIMAT 2.5 MCG/ACT Inhalation Aero Soln, INL 2 PFS PO QD, Disp: , Rfl: 12          Vital Signs:      /77 (BP Location: Left arm, Patient Position: Sitting, Cuff Size: adult)   Pulse 63   Temp 97.7 °F (36.5 °C) (Temporal) architectural distortion, or microcalcifications identified in either breast.      =====  CONCLUSION:  There is no mammographic evidence of malignancy in either breast. As long as patient's clinical breast exam remains unchanged, annual screening mammogram

## 2021-08-24 RX ORDER — LETROZOLE 2.5 MG/1
TABLET, FILM COATED ORAL
Qty: 90 TABLET | Refills: 3 | Status: SHIPPED | OUTPATIENT
Start: 2021-08-24 | End: 2022-08-23

## 2021-09-08 ENCOUNTER — OFFICE VISIT (OUTPATIENT)
Dept: HEMATOLOGY/ONCOLOGY | Facility: HOSPITAL | Age: 76
End: 2021-09-08
Attending: INTERNAL MEDICINE
Payer: MEDICARE

## 2021-09-08 VITALS
BODY MASS INDEX: 23.56 KG/M2 | TEMPERATURE: 97 F | HEART RATE: 68 BPM | RESPIRATION RATE: 18 BRPM | OXYGEN SATURATION: 94 % | SYSTOLIC BLOOD PRESSURE: 152 MMHG | WEIGHT: 120 LBS | HEIGHT: 60 IN | DIASTOLIC BLOOD PRESSURE: 77 MMHG

## 2021-09-08 DIAGNOSIS — C77.3 BREAST CANCER METASTASIZED TO AXILLARY LYMPH NODE, RIGHT (HCC): ICD-10-CM

## 2021-09-08 DIAGNOSIS — M81.6 LOCALIZED OSTEOPOROSIS WITHOUT CURRENT PATHOLOGICAL FRACTURE: Primary | ICD-10-CM

## 2021-09-08 DIAGNOSIS — C50.911 BREAST CANCER METASTASIZED TO AXILLARY LYMPH NODE, RIGHT (HCC): ICD-10-CM

## 2021-09-08 LAB
ALBUMIN SERPL-MCNC: 3.7 G/DL (ref 3.4–5)
CALCIUM BLD-MCNC: 9.2 MG/DL (ref 8.5–10.1)
CREAT BLD-MCNC: 0.67 MG/DL
MAGNESIUM SERPL-MCNC: 1.9 MG/DL (ref 1.6–2.6)

## 2021-09-08 PROCEDURE — 36415 COLL VENOUS BLD VENIPUNCTURE: CPT

## 2021-09-08 PROCEDURE — 83735 ASSAY OF MAGNESIUM: CPT

## 2021-09-08 PROCEDURE — 82040 ASSAY OF SERUM ALBUMIN: CPT

## 2021-09-08 PROCEDURE — 82565 ASSAY OF CREATININE: CPT

## 2021-09-08 PROCEDURE — 96372 THER/PROPH/DIAG INJ SC/IM: CPT

## 2021-09-08 PROCEDURE — 82310 ASSAY OF CALCIUM: CPT

## 2021-09-23 ENCOUNTER — HOSPITAL ENCOUNTER (OUTPATIENT)
Dept: MAMMOGRAPHY | Facility: HOSPITAL | Age: 76
Discharge: HOME | End: 2021-09-23
Attending: INTERNAL MEDICINE
Payer: MEDICARE

## 2021-09-23 DIAGNOSIS — C50.411 MALIGNANT NEOPLASM OF UPPER-OUTER QUADRANT OF RIGHT BREAST IN FEMALE, ESTROGEN RECEPTOR POSITIVE (HCC): ICD-10-CM

## 2021-09-23 DIAGNOSIS — Z17.0 MALIGNANT NEOPLASM OF UPPER-OUTER QUADRANT OF RIGHT BREAST IN FEMALE, ESTROGEN RECEPTOR POSITIVE (HCC): ICD-10-CM

## 2021-09-23 PROCEDURE — 77062 BREAST TOMOSYNTHESIS BI: CPT | Performed by: INTERNAL MEDICINE

## 2021-09-23 PROCEDURE — 77066 DX MAMMO INCL CAD BI: CPT | Performed by: INTERNAL MEDICINE

## 2021-12-29 ENCOUNTER — ORDER TRANSCRIPTION (OUTPATIENT)
Dept: ADMINISTRATIVE | Facility: HOSPITAL | Age: 76
End: 2021-12-29

## 2021-12-29 DIAGNOSIS — Z01.818 PREPROCEDURAL EXAMINATION: Primary | ICD-10-CM

## 2021-12-29 DIAGNOSIS — Z11.59 ENCOUNTER FOR SCREENING FOR OTHER VIRAL DISEASES: ICD-10-CM

## 2022-01-01 ENCOUNTER — LAB ENCOUNTER (OUTPATIENT)
Dept: LAB | Facility: HOSPITAL | Age: 77
End: 2022-01-01
Attending: HOSPITALIST
Payer: MEDICARE

## 2022-01-01 DIAGNOSIS — Z11.59 ENCOUNTER FOR SCREENING FOR OTHER VIRAL DISEASES: ICD-10-CM

## 2022-01-01 DIAGNOSIS — Z01.818 PREPROCEDURAL EXAMINATION: ICD-10-CM

## 2022-01-02 LAB — SARS-COV-2 RNA RESP QL NAA+PROBE: NOT DETECTED

## 2022-01-04 ENCOUNTER — HOSPITAL ENCOUNTER (OUTPATIENT)
Dept: NUCLEAR MEDICINE | Facility: HOSPITAL | Age: 77
Discharge: HOME | End: 2022-01-04
Attending: HOSPITALIST
Payer: MEDICARE

## 2022-01-04 ENCOUNTER — HOSPITAL ENCOUNTER (OUTPATIENT)
Dept: GENERAL RADIOLOGY | Facility: HOSPITAL | Age: 77
Discharge: HOME | End: 2022-01-04
Attending: HOSPITALIST
Payer: MEDICARE

## 2022-01-04 DIAGNOSIS — R06.00 DYSPNEA: ICD-10-CM

## 2022-01-04 PROCEDURE — 71046 X-RAY EXAM CHEST 2 VIEWS: CPT | Performed by: HOSPITALIST

## 2022-01-04 PROCEDURE — 78582 LUNG VENTILAT&PERFUS IMAGING: CPT | Performed by: HOSPITALIST

## 2022-01-13 ENCOUNTER — HOSPITAL ENCOUNTER (OUTPATIENT)
Dept: CT IMAGING | Facility: HOSPITAL | Age: 77
Discharge: HOME | End: 2022-01-13
Attending: HOSPITALIST
Payer: MEDICARE

## 2022-01-13 DIAGNOSIS — R06.00 DYSPNEA: ICD-10-CM

## 2022-01-13 LAB — CREAT BLD-MCNC: 0.7 MG/DL

## 2022-01-13 PROCEDURE — 82565 ASSAY OF CREATININE: CPT

## 2022-01-13 PROCEDURE — 71260 CT THORAX DX C+: CPT | Performed by: HOSPITALIST

## 2022-02-16 ENCOUNTER — OFFICE VISIT (OUTPATIENT)
Dept: HEMATOLOGY/ONCOLOGY | Facility: HOSPITAL | Age: 77
End: 2022-02-16
Attending: INTERNAL MEDICINE
Payer: MEDICARE

## 2022-02-16 ENCOUNTER — SOCIAL WORK SERVICES (OUTPATIENT)
Dept: HEMATOLOGY/ONCOLOGY | Facility: HOSPITAL | Age: 77
End: 2022-02-16

## 2022-02-16 VITALS
RESPIRATION RATE: 16 BRPM | DIASTOLIC BLOOD PRESSURE: 79 MMHG | TEMPERATURE: 98 F | HEART RATE: 75 BPM | SYSTOLIC BLOOD PRESSURE: 122 MMHG | HEIGHT: 60 IN | WEIGHT: 127.81 LBS | BODY MASS INDEX: 25.09 KG/M2 | OXYGEN SATURATION: 96 %

## 2022-02-16 DIAGNOSIS — C50.411 MALIGNANT NEOPLASM OF UPPER-OUTER QUADRANT OF RIGHT BREAST IN FEMALE, ESTROGEN RECEPTOR POSITIVE (HCC): ICD-10-CM

## 2022-02-16 DIAGNOSIS — Z17.0 MALIGNANT NEOPLASM OF UPPER-OUTER QUADRANT OF RIGHT BREAST IN FEMALE, ESTROGEN RECEPTOR POSITIVE (HCC): ICD-10-CM

## 2022-02-16 DIAGNOSIS — C50.911 BREAST CANCER METASTASIZED TO AXILLARY LYMPH NODE, RIGHT (HCC): Primary | ICD-10-CM

## 2022-02-16 DIAGNOSIS — D50.0 IRON DEFICIENCY ANEMIA SECONDARY TO BLOOD LOSS (CHRONIC): ICD-10-CM

## 2022-02-16 DIAGNOSIS — C77.3 BREAST CANCER METASTASIZED TO AXILLARY LYMPH NODE, RIGHT (HCC): Primary | ICD-10-CM

## 2022-02-16 PROCEDURE — 99214 OFFICE O/P EST MOD 30 MIN: CPT | Performed by: INTERNAL MEDICINE

## 2022-02-16 RX ORDER — FUROSEMIDE 20 MG/1
20 TABLET ORAL DAILY
COMMUNITY
Start: 2022-02-04

## 2022-02-16 RX ORDER — IRBESARTAN 75 MG/1
75 TABLET ORAL DAILY
COMMUNITY
Start: 2021-11-12

## 2022-02-16 NOTE — PROGRESS NOTES
Patient is here for follow up for breast cancer. She has been having trouble breathing and sees Dr. Ramesh valencia. She has been on a course of prednisone and this has helped her be able to get around the house. She would like the breathing to be better. She uses continuous oxygen. She is due for prolia in March. She had her mammogram in September.      Education Record    Learner:  Patient and Family Member    Disease / Diagnosis: Breast cancer    Barriers / Limitations:  None   Comments:    Method:  Discussion   Comments:    General Topics:  Side effects and symptom management   Comments:    Outcome:  Shows understanding   Comments:

## 2022-03-03 ENCOUNTER — ORDER TRANSCRIPTION (OUTPATIENT)
Dept: SLEEP CENTER | Age: 77
End: 2022-03-03

## 2022-03-16 ENCOUNTER — OFFICE VISIT (OUTPATIENT)
Dept: HEMATOLOGY/ONCOLOGY | Facility: HOSPITAL | Age: 77
End: 2022-03-16
Attending: INTERNAL MEDICINE
Payer: MEDICARE

## 2022-03-16 VITALS
RESPIRATION RATE: 16 BRPM | DIASTOLIC BLOOD PRESSURE: 56 MMHG | HEART RATE: 82 BPM | SYSTOLIC BLOOD PRESSURE: 94 MMHG | OXYGEN SATURATION: 98 % | TEMPERATURE: 98 F

## 2022-03-16 DIAGNOSIS — M81.6 LOCALIZED OSTEOPOROSIS WITHOUT CURRENT PATHOLOGICAL FRACTURE: Primary | ICD-10-CM

## 2022-03-16 DIAGNOSIS — C50.911 BREAST CANCER METASTASIZED TO AXILLARY LYMPH NODE, RIGHT (HCC): ICD-10-CM

## 2022-03-16 DIAGNOSIS — C77.3 BREAST CANCER METASTASIZED TO AXILLARY LYMPH NODE, RIGHT (HCC): ICD-10-CM

## 2022-03-16 LAB
ALBUMIN SERPL-MCNC: 3.4 G/DL (ref 3.4–5)
CALCIUM BLD-MCNC: 8.7 MG/DL (ref 8.5–10.1)
CREAT BLD-MCNC: 1.05 MG/DL
MAGNESIUM SERPL-MCNC: 1.7 MG/DL (ref 1.6–2.6)

## 2022-03-16 PROCEDURE — 82565 ASSAY OF CREATININE: CPT

## 2022-03-16 PROCEDURE — 36415 COLL VENOUS BLD VENIPUNCTURE: CPT

## 2022-03-16 PROCEDURE — 82040 ASSAY OF SERUM ALBUMIN: CPT

## 2022-03-16 PROCEDURE — 82310 ASSAY OF CALCIUM: CPT

## 2022-03-16 PROCEDURE — 83735 ASSAY OF MAGNESIUM: CPT

## 2022-03-16 PROCEDURE — 96372 THER/PROPH/DIAG INJ SC/IM: CPT

## 2022-03-16 NOTE — PROGRESS NOTES
Education Record    Learner:  Patient and Family Member    Disease / Leandrew Dylan injection    Barriers / Limitations:  None   Comments:    Method:  Discussion   Comments:    General Topics:  Medication and Plan of care reviewed   Comments:    Outcome:  Shows understanding   Comments:

## 2022-08-17 ENCOUNTER — OFFICE VISIT (OUTPATIENT)
Dept: HEMATOLOGY/ONCOLOGY | Facility: HOSPITAL | Age: 77
End: 2022-08-17
Attending: INTERNAL MEDICINE
Payer: MEDICARE

## 2022-08-17 VITALS
TEMPERATURE: 97 F | SYSTOLIC BLOOD PRESSURE: 117 MMHG | DIASTOLIC BLOOD PRESSURE: 87 MMHG | WEIGHT: 126 LBS | OXYGEN SATURATION: 99 % | HEART RATE: 69 BPM | RESPIRATION RATE: 18 BRPM | BODY MASS INDEX: 25 KG/M2

## 2022-08-17 DIAGNOSIS — C50.411 MALIGNANT NEOPLASM OF UPPER-OUTER QUADRANT OF RIGHT BREAST IN FEMALE, ESTROGEN RECEPTOR POSITIVE (HCC): Primary | ICD-10-CM

## 2022-08-17 DIAGNOSIS — C77.3 BREAST CANCER METASTASIZED TO AXILLARY LYMPH NODE, RIGHT (HCC): ICD-10-CM

## 2022-08-17 DIAGNOSIS — C50.911 BREAST CANCER METASTASIZED TO AXILLARY LYMPH NODE, RIGHT (HCC): ICD-10-CM

## 2022-08-17 DIAGNOSIS — M81.6 LOCALIZED OSTEOPOROSIS WITHOUT CURRENT PATHOLOGICAL FRACTURE: ICD-10-CM

## 2022-08-17 DIAGNOSIS — D50.0 IRON DEFICIENCY ANEMIA SECONDARY TO BLOOD LOSS (CHRONIC): ICD-10-CM

## 2022-08-17 DIAGNOSIS — Z17.0 MALIGNANT NEOPLASM OF UPPER-OUTER QUADRANT OF RIGHT BREAST IN FEMALE, ESTROGEN RECEPTOR POSITIVE (HCC): Primary | ICD-10-CM

## 2022-08-17 LAB
BASOPHILS # BLD AUTO: 0.05 X10(3) UL (ref 0–0.2)
BASOPHILS NFR BLD AUTO: 0.7 %
DEPRECATED HBV CORE AB SER IA-ACNC: 12 NG/ML
EOSINOPHIL # BLD AUTO: 0.3 X10(3) UL (ref 0–0.7)
EOSINOPHIL NFR BLD AUTO: 4 %
ERYTHROCYTE [DISTWIDTH] IN BLOOD BY AUTOMATED COUNT: 17.5 %
HCT VFR BLD AUTO: 36.4 %
HGB BLD-MCNC: 11.4 G/DL
IMM GRANULOCYTES # BLD AUTO: 0.01 X10(3) UL (ref 0–1)
IMM GRANULOCYTES NFR BLD: 0.1 %
LYMPHOCYTES # BLD AUTO: 1.81 X10(3) UL (ref 1–4)
LYMPHOCYTES NFR BLD AUTO: 24.4 %
MCH RBC QN AUTO: 27.3 PG (ref 26–34)
MCHC RBC AUTO-ENTMCNC: 31.3 G/DL (ref 31–37)
MCV RBC AUTO: 87.3 FL
MONOCYTES # BLD AUTO: 0.75 X10(3) UL (ref 0.1–1)
MONOCYTES NFR BLD AUTO: 10.1 %
NEUTROPHILS # BLD AUTO: 4.5 X10 (3) UL (ref 1.5–7.7)
NEUTROPHILS # BLD AUTO: 4.5 X10(3) UL (ref 1.5–7.7)
NEUTROPHILS NFR BLD AUTO: 60.7 %
PLATELET # BLD AUTO: 334 10(3)UL (ref 150–450)
RBC # BLD AUTO: 4.17 X10(6)UL
WBC # BLD AUTO: 7.4 X10(3) UL (ref 4–11)

## 2022-08-17 PROCEDURE — 99214 OFFICE O/P EST MOD 30 MIN: CPT | Performed by: INTERNAL MEDICINE

## 2022-08-17 RX ORDER — FLUTICASONE FUROATE, UMECLIDINIUM BROMIDE AND VILANTEROL TRIFENATATE 200; 62.5; 25 UG/1; UG/1; UG/1
POWDER RESPIRATORY (INHALATION)
COMMUNITY

## 2022-08-17 NOTE — PROGRESS NOTES
Patient is here for follow up for breast cancer on letrozole. She is tolerating this well. She has a rash on her abdomen that is very itchy. She thinks that this could be from the letrozole as it started shortly afterwards. She has seen a dermatologist and has had a cortisone injection and was instructed to use a cream for this. She is on continuous oxygen and is doing lung therapy. Her energy is low. She has spurts of energy. She denies any bleeding but she bruises easily.      Education Record    Learner:  Patient and Family Member    Disease / Diagnosis: Breast cancer/ iron deficiency     Barriers / Limitations:  None   Comments:    Method:  Discussion   Comments:    General Topics:  Side effects and symptom management   Comments:    Outcome:  Shows understanding   Comments:

## 2022-08-22 ENCOUNTER — TELEPHONE (OUTPATIENT)
Dept: HEMATOLOGY/ONCOLOGY | Facility: HOSPITAL | Age: 77
End: 2022-08-22

## 2022-08-22 NOTE — TELEPHONE ENCOUNTER
Left a message for patient regarding her lab results and Dr. Benji Burt recommendation for iron infusion, which she has had before. Requested that she call back to schedule this appointment or if she has any questions.

## 2022-08-23 RX ORDER — LETROZOLE 2.5 MG/1
TABLET, FILM COATED ORAL
Qty: 90 TABLET | Refills: 3 | Status: SHIPPED | OUTPATIENT
Start: 2022-08-23 | End: 2023-02-20 | Stop reason: ALTCHOICE

## 2022-08-25 ENCOUNTER — OFFICE VISIT (OUTPATIENT)
Dept: HEMATOLOGY/ONCOLOGY | Facility: HOSPITAL | Age: 77
End: 2022-08-25
Attending: INTERNAL MEDICINE
Payer: MEDICARE

## 2022-08-25 VITALS
OXYGEN SATURATION: 98 % | TEMPERATURE: 98 F | DIASTOLIC BLOOD PRESSURE: 71 MMHG | HEART RATE: 90 BPM | SYSTOLIC BLOOD PRESSURE: 136 MMHG | RESPIRATION RATE: 18 BRPM

## 2022-08-25 DIAGNOSIS — D50.0 IRON DEFICIENCY ANEMIA SECONDARY TO BLOOD LOSS (CHRONIC): Primary | ICD-10-CM

## 2022-08-25 PROCEDURE — 96365 THER/PROPH/DIAG IV INF INIT: CPT

## 2022-08-25 NOTE — PROGRESS NOTES
Infed infused without any complications. Observed for half hour after infusion. Vital signs taken at the end of the 30-minute observation. Patient denied any complaints/issues. Discharged in good condition. Advised to call for any questions/concerns/issues. When patient got up to leave, patient felt dizzy. Sat down, and vitals were taken. See flowsheet. Patient felt better after sitting for awhile and wanted to go home. Vitals stable.

## 2022-09-21 ENCOUNTER — HOSPITAL ENCOUNTER (OUTPATIENT)
Dept: BONE DENSITY | Age: 77
Discharge: HOME | End: 2022-09-21
Attending: INTERNAL MEDICINE

## 2022-09-21 DIAGNOSIS — M81.6 LOCALIZED OSTEOPOROSIS WITHOUT CURRENT PATHOLOGICAL FRACTURE: ICD-10-CM

## 2022-09-21 PROCEDURE — 77080 DXA BONE DENSITY AXIAL: CPT | Performed by: INTERNAL MEDICINE

## 2022-10-26 ENCOUNTER — TELEPHONE (OUTPATIENT)
Dept: HEMATOLOGY/ONCOLOGY | Facility: HOSPITAL | Age: 77
End: 2022-10-26

## 2022-10-26 NOTE — TELEPHONE ENCOUNTER
Called patient and explained about her dexa scan and Dr. Benji Burt recommendation for zometa every 6 months. Answered her questions. Explained that the  will call her with an appointment.

## 2022-10-31 ENCOUNTER — TELEPHONE (OUTPATIENT)
Facility: CLINIC | Age: 77
End: 2022-10-31

## 2022-10-31 RX ORDER — FLUTICASONE FUROATE, UMECLIDINIUM BROMIDE AND VILANTEROL TRIFENATATE 200; 62.5; 25 UG/1; UG/1; UG/1
1 POWDER RESPIRATORY (INHALATION) DAILY
Qty: 3 EACH | Refills: 1 | Status: SHIPPED | OUTPATIENT
Start: 2022-10-31

## 2022-10-31 NOTE — TELEPHONE ENCOUNTER
Rx for Trelegy 200 needs to be faxed to [phone]. Pt has been trying to fax but it needs to be done by MD office. Left message for both pt and daughter to call to discuss Rx. Pt is in Medicare coverage gap (donut hole) and needs Rx to go to [address] [place] [address] as Trelegy is unaffordable. Rx printed, signed and faxed as above.

## 2022-11-02 ENCOUNTER — TELEPHONE (OUTPATIENT)
Facility: CLINIC | Age: 77
End: 2022-11-02

## 2022-11-02 DIAGNOSIS — J70.1 FIBROSIS OF LUNG FOLLOWING RADIATION (HCC): Primary | ICD-10-CM

## 2022-11-02 RX ORDER — FLUTICASONE FUROATE, UMECLIDINIUM BROMIDE AND VILANTEROL TRIFENATATE 200; 62.5; 25 UG/1; UG/1; UG/1
1 POWDER RESPIRATORY (INHALATION) DAILY
Qty: 3 EACH | Refills: 1 | Status: SHIPPED | OUTPATIENT
Start: 2022-11-02

## 2022-11-02 NOTE — TELEPHONE ENCOUNTER
Pt and son notified that Dr. Cole Foreman put in order for CT scan chest for January and to follow up with her after CT scan. She will call tomorrow to scheduled both. She currently has appt scheduled with Dr. Cole Foreman in December but she will reschedule.

## 2022-11-02 NOTE — TELEPHONE ENCOUNTER
[email or web address] did not accept electronic signature for Trelegy. Need to refax with actual signature by Dr. Cole Foreman. Placed on Dr. Sierra Carlos desk for a signature.   Signed by Dr. Cole Foreman and faxed to [phone]

## 2022-11-02 NOTE — TELEPHONE ENCOUNTER
Pt and son called regarding phone call from [facility] stating that pt's age disqualifiies her for CT Lung LD screening. Pt is 63 y/o. Per CMS guidelines viewed on internet and Cheryle Clinton, age limit is 78y/o. Please advise next steps. Pt is scheduled for tomorrow at 100pm.  Please advise.

## 2022-11-03 ENCOUNTER — OFFICE VISIT (OUTPATIENT)
Dept: HEMATOLOGY/ONCOLOGY | Facility: HOSPITAL | Age: 77
End: 2022-11-03
Attending: INTERNAL MEDICINE
Payer: MEDICARE

## 2022-11-03 ENCOUNTER — HOSPITAL ENCOUNTER (OUTPATIENT)
Dept: MAMMOGRAPHY | Facility: HOSPITAL | Age: 77
Discharge: HOME | End: 2022-11-03
Attending: INTERNAL MEDICINE
Payer: MEDICARE

## 2022-11-03 ENCOUNTER — APPOINTMENT (OUTPATIENT)
Dept: CT IMAGING | Facility: HOSPITAL | Age: 77
End: 2022-11-03
Attending: INTERNAL MEDICINE
Payer: MEDICARE

## 2022-11-03 VITALS
DIASTOLIC BLOOD PRESSURE: 84 MMHG | SYSTOLIC BLOOD PRESSURE: 131 MMHG | RESPIRATION RATE: 16 BRPM | OXYGEN SATURATION: 92 % | HEART RATE: 66 BPM | BODY MASS INDEX: 24.74 KG/M2 | HEIGHT: 60 IN | WEIGHT: 126 LBS | TEMPERATURE: 97 F

## 2022-11-03 DIAGNOSIS — C77.3 BREAST CANCER METASTASIZED TO AXILLARY LYMPH NODE, RIGHT (HCC): ICD-10-CM

## 2022-11-03 DIAGNOSIS — D50.0 IRON DEFICIENCY ANEMIA SECONDARY TO BLOOD LOSS (CHRONIC): Primary | ICD-10-CM

## 2022-11-03 DIAGNOSIS — Z17.0 MALIGNANT NEOPLASM OF UPPER-OUTER QUADRANT OF RIGHT BREAST IN FEMALE, ESTROGEN RECEPTOR POSITIVE (HCC): ICD-10-CM

## 2022-11-03 DIAGNOSIS — C50.911 BREAST CANCER METASTASIZED TO AXILLARY LYMPH NODE, RIGHT (HCC): ICD-10-CM

## 2022-11-03 DIAGNOSIS — C50.411 MALIGNANT NEOPLASM OF UPPER-OUTER QUADRANT OF RIGHT BREAST IN FEMALE, ESTROGEN RECEPTOR POSITIVE (HCC): ICD-10-CM

## 2022-11-03 LAB
CALCIUM BLD-MCNC: 9.9 MG/DL (ref 8.5–10.1)
CREAT BLD-MCNC: 1.01 MG/DL
GFR SERPLBLD BASED ON 1.73 SQ M-ARVRAT: 57 ML/MIN/1.73M2 (ref 60–?)

## 2022-11-03 PROCEDURE — 77062 BREAST TOMOSYNTHESIS BI: CPT | Performed by: INTERNAL MEDICINE

## 2022-11-03 PROCEDURE — 82565 ASSAY OF CREATININE: CPT

## 2022-11-03 PROCEDURE — 82310 ASSAY OF CALCIUM: CPT

## 2022-11-03 PROCEDURE — 96374 THER/PROPH/DIAG INJ IV PUSH: CPT

## 2022-11-03 PROCEDURE — 77066 DX MAMMO INCL CAD BI: CPT | Performed by: INTERNAL MEDICINE

## 2022-11-03 PROCEDURE — 36415 COLL VENOUS BLD VENIPUNCTURE: CPT

## 2022-11-03 NOTE — PROGRESS NOTES
Education Record    Learner:  Patient    Disease / Diagnosis: Zometa    Barriers / Limitations:  None   Comments:    Method:  Brief focused and Reinforcement   Comments:    General Topics:  Diet, Medication, Side effects and symptom management and Plan of care reviewed   Comments:    Outcome:  Shows understanding   Comments: Patient tolerated infusion and discharged in stable condition.
No

## 2022-11-04 ENCOUNTER — APPOINTMENT (OUTPATIENT)
Dept: HEMATOLOGY/ONCOLOGY | Facility: HOSPITAL | Age: 77
End: 2022-11-04
Attending: INTERNAL MEDICINE
Payer: MEDICARE

## 2022-12-06 ENCOUNTER — TELEPHONE (OUTPATIENT)
Facility: CLINIC | Age: 77
End: 2022-12-06

## 2022-12-06 NOTE — TELEPHONE ENCOUNTER
Patient has questions regarding ct that was ordered for her. States someone has called her to let her know that because of her age her insurance will not cover the ct chest.  She is confused so she did not have it done.   Please advise

## 2022-12-06 NOTE — TELEPHONE ENCOUNTER
Call to pt, advised her that a new order was placed 11/2 for different CT. Advised pt to call centralized scheduling to schedule for January, then call our office to schedule f/u with TZ. Pt verbalized understanding.

## 2023-01-12 ENCOUNTER — HOSPITAL ENCOUNTER (OUTPATIENT)
Dept: CT IMAGING | Facility: HOSPITAL | Age: 78
Discharge: HOME | End: 2023-01-12
Attending: INTERNAL MEDICINE
Payer: MEDICARE

## 2023-01-12 DIAGNOSIS — J70.1 FIBROSIS OF LUNG FOLLOWING RADIATION (HCC): ICD-10-CM

## 2023-01-12 PROCEDURE — 71250 CT THORAX DX C-: CPT | Performed by: INTERNAL MEDICINE

## 2023-01-20 ENCOUNTER — OFFICE VISIT (OUTPATIENT)
Facility: CLINIC | Age: 78
End: 2023-01-20
Payer: MEDICARE

## 2023-01-20 VITALS
HEART RATE: 55 BPM | BODY MASS INDEX: 24.7 KG/M2 | RESPIRATION RATE: 16 BRPM | HEIGHT: 60 IN | SYSTOLIC BLOOD PRESSURE: 110 MMHG | OXYGEN SATURATION: 98 % | WEIGHT: 125.81 LBS | DIASTOLIC BLOOD PRESSURE: 62 MMHG

## 2023-01-20 DIAGNOSIS — R91.1 PULMONARY NODULE: Primary | ICD-10-CM

## 2023-01-20 PROCEDURE — 99214 OFFICE O/P EST MOD 30 MIN: CPT | Performed by: INTERNAL MEDICINE

## 2023-01-20 RX ORDER — ALBUTEROL SULFATE 90 UG/1
2 AEROSOL, METERED RESPIRATORY (INHALATION)
COMMUNITY

## 2023-01-20 RX ORDER — POTASSIUM CHLORIDE 1500 MG/1
1 TABLET, FILM COATED, EXTENDED RELEASE ORAL DAILY
COMMUNITY
Start: 2022-11-10

## 2023-01-20 RX ORDER — BIOTIN 10 MG
1 TABLET ORAL DAILY
COMMUNITY

## 2023-01-20 RX ORDER — LORAZEPAM 0.5 MG/1
0.5 TABLET ORAL 2 TIMES DAILY
COMMUNITY
Start: 2022-11-10

## 2023-02-20 ENCOUNTER — OFFICE VISIT (OUTPATIENT)
Dept: HEMATOLOGY/ONCOLOGY | Facility: HOSPITAL | Age: 78
End: 2023-02-20
Attending: INTERNAL MEDICINE
Payer: MEDICARE

## 2023-02-20 VITALS
BODY MASS INDEX: 24.94 KG/M2 | TEMPERATURE: 98 F | HEART RATE: 77 BPM | HEIGHT: 60 IN | SYSTOLIC BLOOD PRESSURE: 116 MMHG | DIASTOLIC BLOOD PRESSURE: 76 MMHG | RESPIRATION RATE: 16 BRPM | OXYGEN SATURATION: 94 % | WEIGHT: 127 LBS

## 2023-02-20 DIAGNOSIS — D50.0 IRON DEFICIENCY ANEMIA SECONDARY TO BLOOD LOSS (CHRONIC): ICD-10-CM

## 2023-02-20 DIAGNOSIS — C50.411 MALIGNANT NEOPLASM OF UPPER-OUTER QUADRANT OF RIGHT BREAST IN FEMALE, ESTROGEN RECEPTOR POSITIVE (HCC): Primary | ICD-10-CM

## 2023-02-20 DIAGNOSIS — Z17.0 MALIGNANT NEOPLASM OF UPPER-OUTER QUADRANT OF RIGHT BREAST IN FEMALE, ESTROGEN RECEPTOR POSITIVE (HCC): Primary | ICD-10-CM

## 2023-02-20 LAB
ALBUMIN SERPL-MCNC: 3.9 G/DL (ref 3.4–5)
ALBUMIN/GLOB SERPL: 1.2 {RATIO} (ref 1–2)
ALP LIVER SERPL-CCNC: 75 U/L
ALT SERPL-CCNC: 28 U/L
ANION GAP SERPL CALC-SCNC: 6 MMOL/L (ref 0–18)
AST SERPL-CCNC: 27 U/L (ref 15–37)
BASOPHILS # BLD AUTO: 0.04 X10(3) UL (ref 0–0.2)
BASOPHILS NFR BLD AUTO: 0.5 %
BILIRUB SERPL-MCNC: 0.7 MG/DL (ref 0.1–2)
BUN BLD-MCNC: 27 MG/DL (ref 7–18)
CALCIUM BLD-MCNC: 9 MG/DL (ref 8.5–10.1)
CHLORIDE SERPL-SCNC: 104 MMOL/L (ref 98–112)
CO2 SERPL-SCNC: 27 MMOL/L (ref 21–32)
CREAT BLD-MCNC: 0.98 MG/DL
DEPRECATED HBV CORE AB SER IA-ACNC: 112.6 NG/ML
EOSINOPHIL # BLD AUTO: 0.26 X10(3) UL (ref 0–0.7)
EOSINOPHIL NFR BLD AUTO: 3.1 %
ERYTHROCYTE [DISTWIDTH] IN BLOOD BY AUTOMATED COUNT: 13 %
GFR SERPLBLD BASED ON 1.73 SQ M-ARVRAT: 59 ML/MIN/1.73M2 (ref 60–?)
GLOBULIN PLAS-MCNC: 3.2 G/DL (ref 2.8–4.4)
GLUCOSE BLD-MCNC: 102 MG/DL (ref 70–99)
HCT VFR BLD AUTO: 38 %
HGB BLD-MCNC: 12.6 G/DL
IMM GRANULOCYTES # BLD AUTO: 0.02 X10(3) UL (ref 0–1)
IMM GRANULOCYTES NFR BLD: 0.2 %
LYMPHOCYTES # BLD AUTO: 1.65 X10(3) UL (ref 1–4)
LYMPHOCYTES NFR BLD AUTO: 19.7 %
MCH RBC QN AUTO: 31.9 PG (ref 26–34)
MCHC RBC AUTO-ENTMCNC: 33.2 G/DL (ref 31–37)
MCV RBC AUTO: 96.2 FL
MONOCYTES # BLD AUTO: 0.82 X10(3) UL (ref 0.1–1)
MONOCYTES NFR BLD AUTO: 9.8 %
NEUTROPHILS # BLD AUTO: 5.59 X10 (3) UL (ref 1.5–7.7)
NEUTROPHILS # BLD AUTO: 5.59 X10(3) UL (ref 1.5–7.7)
NEUTROPHILS NFR BLD AUTO: 66.7 %
OSMOLALITY SERPL CALC.SUM OF ELEC: 289 MOSM/KG (ref 275–295)
PLATELET # BLD AUTO: 306 10(3)UL (ref 150–450)
POTASSIUM SERPL-SCNC: 3.8 MMOL/L (ref 3.5–5.1)
PROT SERPL-MCNC: 7.1 G/DL (ref 6.4–8.2)
RBC # BLD AUTO: 3.95 X10(6)UL
SODIUM SERPL-SCNC: 137 MMOL/L (ref 136–145)
WBC # BLD AUTO: 8.4 X10(3) UL (ref 4–11)

## 2023-02-20 PROCEDURE — 99214 OFFICE O/P EST MOD 30 MIN: CPT | Performed by: INTERNAL MEDICINE

## 2023-02-20 NOTE — PROGRESS NOTES
Patient is here for follow up for breast cancer and iron deficiency. She is feeling tired all the time. She is always short of breath and uses oxygen continuously. She denies any problems with the letrozole. She is eating well. She denies any fevers.      Education Record    Learner:  Patient and Family Member    Disease / Diagnosis: Breast cancer/iron deficiency     Barriers / Limitations:  None   Comments:    Method:  Discussion   Comments:    General Topics:  Side effects and symptom management   Comments:    Outcome:  Shows understanding   Comments:

## 2023-04-20 ENCOUNTER — OFFICE VISIT (OUTPATIENT)
Dept: HEMATOLOGY/ONCOLOGY | Facility: HOSPITAL | Age: 78
End: 2023-04-20
Attending: INTERNAL MEDICINE
Payer: MEDICARE

## 2023-04-20 VITALS
HEIGHT: 60 IN | HEART RATE: 79 BPM | TEMPERATURE: 98 F | RESPIRATION RATE: 20 BRPM | WEIGHT: 129 LBS | DIASTOLIC BLOOD PRESSURE: 74 MMHG | OXYGEN SATURATION: 100 % | SYSTOLIC BLOOD PRESSURE: 110 MMHG | BODY MASS INDEX: 25.32 KG/M2

## 2023-04-20 DIAGNOSIS — D50.0 IRON DEFICIENCY ANEMIA SECONDARY TO BLOOD LOSS (CHRONIC): Primary | ICD-10-CM

## 2023-04-20 DIAGNOSIS — C50.411 MALIGNANT NEOPLASM OF UPPER-OUTER QUADRANT OF RIGHT BREAST IN FEMALE, ESTROGEN RECEPTOR POSITIVE (HCC): ICD-10-CM

## 2023-04-20 DIAGNOSIS — Z17.0 MALIGNANT NEOPLASM OF UPPER-OUTER QUADRANT OF RIGHT BREAST IN FEMALE, ESTROGEN RECEPTOR POSITIVE (HCC): ICD-10-CM

## 2023-04-20 LAB
ALBUMIN SERPL-MCNC: 3.7 G/DL (ref 3.4–5)
ALBUMIN/GLOB SERPL: 1.2 {RATIO} (ref 1–2)
ALP LIVER SERPL-CCNC: 84 U/L
ALT SERPL-CCNC: 29 U/L
ANION GAP SERPL CALC-SCNC: 4 MMOL/L (ref 0–18)
AST SERPL-CCNC: 24 U/L (ref 15–37)
BASOPHILS # BLD AUTO: 0.04 X10(3) UL (ref 0–0.2)
BASOPHILS NFR BLD AUTO: 0.5 %
BILIRUB SERPL-MCNC: 0.5 MG/DL (ref 0.1–2)
BUN BLD-MCNC: 22 MG/DL (ref 7–18)
CALCIUM BLD-MCNC: 8.7 MG/DL (ref 8.5–10.1)
CHLORIDE SERPL-SCNC: 106 MMOL/L (ref 98–112)
CO2 SERPL-SCNC: 28 MMOL/L (ref 21–32)
CREAT BLD-MCNC: 1.06 MG/DL
DEPRECATED HBV CORE AB SER IA-ACNC: 84.3 NG/ML
EOSINOPHIL # BLD AUTO: 0.27 X10(3) UL (ref 0–0.7)
EOSINOPHIL NFR BLD AUTO: 3.1 %
ERYTHROCYTE [DISTWIDTH] IN BLOOD BY AUTOMATED COUNT: 13.4 %
GFR SERPLBLD BASED ON 1.73 SQ M-ARVRAT: 53 ML/MIN/1.73M2 (ref 60–?)
GLOBULIN PLAS-MCNC: 3.1 G/DL (ref 2.8–4.4)
GLUCOSE BLD-MCNC: 141 MG/DL (ref 70–99)
HCT VFR BLD AUTO: 36.7 %
HGB BLD-MCNC: 12 G/DL
IMM GRANULOCYTES # BLD AUTO: 0.02 X10(3) UL (ref 0–1)
IMM GRANULOCYTES NFR BLD: 0.2 %
LDH SERPL L TO P-CCNC: 189 U/L
LYMPHOCYTES # BLD AUTO: 1.6 X10(3) UL (ref 1–4)
LYMPHOCYTES NFR BLD AUTO: 18.2 %
MCH RBC QN AUTO: 31.9 PG (ref 26–34)
MCHC RBC AUTO-ENTMCNC: 32.7 G/DL (ref 31–37)
MCV RBC AUTO: 97.6 FL
MONOCYTES # BLD AUTO: 0.75 X10(3) UL (ref 0.1–1)
MONOCYTES NFR BLD AUTO: 8.5 %
NEUTROPHILS # BLD AUTO: 6.13 X10 (3) UL (ref 1.5–7.7)
NEUTROPHILS # BLD AUTO: 6.13 X10(3) UL (ref 1.5–7.7)
NEUTROPHILS NFR BLD AUTO: 69.5 %
OSMOLALITY SERPL CALC.SUM OF ELEC: 292 MOSM/KG (ref 275–295)
PLATELET # BLD AUTO: 256 10(3)UL (ref 150–450)
POTASSIUM SERPL-SCNC: 3.5 MMOL/L (ref 3.5–5.1)
PROT SERPL-MCNC: 6.8 G/DL (ref 6.4–8.2)
RBC # BLD AUTO: 3.76 X10(6)UL
SODIUM SERPL-SCNC: 138 MMOL/L (ref 136–145)
WBC # BLD AUTO: 8.8 X10(3) UL (ref 4–11)

## 2023-04-20 PROCEDURE — 82728 ASSAY OF FERRITIN: CPT

## 2023-04-20 PROCEDURE — 83615 LACTATE (LD) (LDH) ENZYME: CPT

## 2023-04-20 PROCEDURE — 85025 COMPLETE CBC W/AUTO DIFF WBC: CPT

## 2023-04-20 PROCEDURE — 80053 COMPREHEN METABOLIC PANEL: CPT

## 2023-04-20 PROCEDURE — 96374 THER/PROPH/DIAG INJ IV PUSH: CPT

## 2023-04-20 NOTE — PROGRESS NOTES
Education Record    Learner:  Patient    Disease / Diagnosis:Zometa infusion     Barriers / Limitations:  None   Comments:    Method:  Brief focused   Comments:    General Topics:  Infection, Medication, Pain, Precautions, Procedure, Side effects and symptom management, Plan of care reviewed and Fall risk and prevention   Comments:    Outcome:  Shows understanding   Comments:    Labs ok , Zometa tolerated well.  Scheduled next Zometa in 24 weeks

## 2023-04-28 NOTE — PROGRESS NOTES
RENETTA met with patient who was recently diagnosed with breast cancer. Patient also has Scoliosis and is in pain. Patient has another Doctor that treats her for that diagnosis. Patient is  and has six adult children.  Manny Donovan was with her today and lives ab
n/a

## 2023-06-06 ENCOUNTER — HOSPITAL ENCOUNTER (OUTPATIENT)
Dept: CT IMAGING | Facility: HOSPITAL | Age: 78
Discharge: HOME | End: 2023-06-06
Attending: INTERNAL MEDICINE
Payer: MEDICARE

## 2023-06-06 DIAGNOSIS — R91.1 PULMONARY NODULE: ICD-10-CM

## 2023-06-06 PROCEDURE — 71250 CT THORAX DX C-: CPT | Performed by: INTERNAL MEDICINE

## 2023-06-13 ENCOUNTER — MED REC SCAN ONLY (OUTPATIENT)
Facility: CLINIC | Age: 78
End: 2023-06-13

## 2023-06-15 ENCOUNTER — OFFICE VISIT (OUTPATIENT)
Facility: CLINIC | Age: 78
End: 2023-06-15
Payer: MEDICARE

## 2023-06-15 VITALS
HEART RATE: 57 BPM | BODY MASS INDEX: 25 KG/M2 | DIASTOLIC BLOOD PRESSURE: 64 MMHG | SYSTOLIC BLOOD PRESSURE: 98 MMHG | HEIGHT: 60 IN | RESPIRATION RATE: 16 BRPM | OXYGEN SATURATION: 97 %

## 2023-06-15 DIAGNOSIS — J43.8 OTHER EMPHYSEMA (HCC): Primary | ICD-10-CM

## 2023-06-15 PROCEDURE — 99214 OFFICE O/P EST MOD 30 MIN: CPT | Performed by: INTERNAL MEDICINE

## 2023-06-15 RX ORDER — ROPINIROLE 0.5 MG/1
0.5 TABLET, FILM COATED ORAL 3 TIMES DAILY
COMMUNITY

## 2023-06-15 RX ORDER — TORSEMIDE 20 MG/1
20 TABLET ORAL DAILY
COMMUNITY
Start: 2023-05-03

## 2023-06-15 RX ORDER — ALBUTEROL SULFATE 90 UG/1
2 AEROSOL, METERED RESPIRATORY (INHALATION)
Qty: 1 EACH | Refills: 0 | Status: SHIPPED | OUTPATIENT
Start: 2023-06-15

## 2023-06-15 NOTE — PATIENT INSTRUCTIONS
-Plan:  - same medications              - to get blood work - next blood draw              - see me in 6 months   -  Manuel Tucker MD  Pulmonary Medicine  0104/35

## 2023-08-21 ENCOUNTER — OFFICE VISIT (OUTPATIENT)
Dept: HEMATOLOGY/ONCOLOGY | Facility: HOSPITAL | Age: 78
End: 2023-08-21
Attending: INTERNAL MEDICINE
Payer: MEDICARE

## 2023-08-21 VITALS
OXYGEN SATURATION: 98 % | SYSTOLIC BLOOD PRESSURE: 129 MMHG | TEMPERATURE: 97 F | WEIGHT: 124.5 LBS | DIASTOLIC BLOOD PRESSURE: 87 MMHG | HEART RATE: 59 BPM | BODY MASS INDEX: 24 KG/M2

## 2023-08-21 DIAGNOSIS — D50.0 IRON DEFICIENCY ANEMIA SECONDARY TO BLOOD LOSS (CHRONIC): ICD-10-CM

## 2023-08-21 DIAGNOSIS — Z17.0 MALIGNANT NEOPLASM OF UPPER-OUTER QUADRANT OF RIGHT BREAST IN FEMALE, ESTROGEN RECEPTOR POSITIVE: Primary | ICD-10-CM

## 2023-08-21 DIAGNOSIS — C50.411 MALIGNANT NEOPLASM OF UPPER-OUTER QUADRANT OF RIGHT BREAST IN FEMALE, ESTROGEN RECEPTOR POSITIVE: Primary | ICD-10-CM

## 2023-08-21 LAB
ALBUMIN SERPL-MCNC: 3.9 G/DL (ref 3.4–5)
ALBUMIN/GLOB SERPL: 1.3 {RATIO} (ref 1–2)
ALP LIVER SERPL-CCNC: 88 U/L
ALT SERPL-CCNC: 30 U/L
ANION GAP SERPL CALC-SCNC: 3 MMOL/L (ref 0–18)
AST SERPL-CCNC: 24 U/L (ref 15–37)
BASOPHILS # BLD AUTO: 0.03 X10(3) UL (ref 0–0.2)
BASOPHILS NFR BLD AUTO: 0.4 %
BILIRUB SERPL-MCNC: 1 MG/DL (ref 0.1–2)
BUN BLD-MCNC: 21 MG/DL (ref 7–18)
CALCIUM BLD-MCNC: 8.8 MG/DL (ref 8.5–10.1)
CHLORIDE SERPL-SCNC: 103 MMOL/L (ref 98–112)
CO2 SERPL-SCNC: 29 MMOL/L (ref 21–32)
CREAT BLD-MCNC: 1.12 MG/DL
DEPRECATED HBV CORE AB SER IA-ACNC: 63.8 NG/ML
EGFRCR SERPLBLD CKD-EPI 2021: 50 ML/MIN/1.73M2 (ref 60–?)
EOSINOPHIL # BLD AUTO: 0.19 X10(3) UL (ref 0–0.7)
EOSINOPHIL NFR BLD AUTO: 2.8 %
ERYTHROCYTE [DISTWIDTH] IN BLOOD BY AUTOMATED COUNT: 13.4 %
FASTING STATUS PATIENT QL REPORTED: NO
GLOBULIN PLAS-MCNC: 3.1 G/DL (ref 2.8–4.4)
GLUCOSE BLD-MCNC: 92 MG/DL (ref 70–99)
HCT VFR BLD AUTO: 39.1 %
HGB BLD-MCNC: 13 G/DL
IMM GRANULOCYTES # BLD AUTO: 0.03 X10(3) UL (ref 0–1)
IMM GRANULOCYTES NFR BLD: 0.4 %
LYMPHOCYTES # BLD AUTO: 1.41 X10(3) UL (ref 1–4)
LYMPHOCYTES NFR BLD AUTO: 20.5 %
MCH RBC QN AUTO: 31.6 PG (ref 26–34)
MCHC RBC AUTO-ENTMCNC: 33.2 G/DL (ref 31–37)
MCV RBC AUTO: 95.1 FL
MONOCYTES # BLD AUTO: 0.76 X10(3) UL (ref 0.1–1)
MONOCYTES NFR BLD AUTO: 11 %
NEUTROPHILS # BLD AUTO: 4.46 X10 (3) UL (ref 1.5–7.7)
NEUTROPHILS # BLD AUTO: 4.46 X10(3) UL (ref 1.5–7.7)
NEUTROPHILS NFR BLD AUTO: 64.9 %
OSMOLALITY SERPL CALC.SUM OF ELEC: 283 MOSM/KG (ref 275–295)
PLATELET # BLD AUTO: 271 10(3)UL (ref 150–450)
POTASSIUM SERPL-SCNC: 3.7 MMOL/L (ref 3.5–5.1)
PROT SERPL-MCNC: 7 G/DL (ref 6.4–8.2)
RBC # BLD AUTO: 4.11 X10(6)UL
SODIUM SERPL-SCNC: 135 MMOL/L (ref 136–145)
WBC # BLD AUTO: 6.9 X10(3) UL (ref 4–11)

## 2023-08-21 PROCEDURE — 99213 OFFICE O/P EST LOW 20 MIN: CPT | Performed by: INTERNAL MEDICINE

## 2023-08-21 NOTE — PROGRESS NOTES
Patient is here for follow up for breast cancer and iron deficiency anemia. She is feeling fairly well. She is on continuous oxygen. Her breathing is stable. She has a tooth infection at this time and is on antibiotics. She is scheduled for dental work in September. She would be due for her next zometa in October. She is wondering what to do about the infusion, when to reschedule this? She denies any fever. She is eating well.       Education Record    Learner:  Patient and Family Member    Disease / Diagnosis: Breast cancer, iron deficiency    Barriers / Limitations:  None   Comments:    Method:  Discussion   Comments:    General Topics:  Side effects and symptom management   Comments:    Outcome:  Shows understanding   Comments:

## 2023-09-05 RX ORDER — FLUTICASONE FUROATE, UMECLIDINIUM BROMIDE AND VILANTEROL TRIFENATATE 200; 62.5; 25 UG/1; UG/1; UG/1
1 POWDER RESPIRATORY (INHALATION) DAILY
Qty: 3 EACH | Refills: 1 | Status: SHIPPED | OUTPATIENT
Start: 2023-09-05

## 2023-09-05 NOTE — TELEPHONE ENCOUNTER
Received RX refill request for Trelegy. Next scheduled appt 12/15/23. Last appt 06/15/23. Last OV notes \"same medications \"  RX pended and routed to provider.

## 2023-10-04 NOTE — PATIENT INSTRUCTIONS
Medication: Sertraline 50 mg   Medication refill denied. Refills are on file at pharmacy.     Please start your Symbicort for COPD. You may try Albuterol inhaler every 6hrs for shortness of breath.

## 2023-10-05 ENCOUNTER — APPOINTMENT (OUTPATIENT)
Dept: HEMATOLOGY/ONCOLOGY | Facility: HOSPITAL | Age: 78
End: 2023-10-05
Attending: INTERNAL MEDICINE
Payer: MEDICARE

## 2023-10-16 ENCOUNTER — TELEPHONE (OUTPATIENT)
Facility: CLINIC | Age: 78
End: 2023-10-16

## 2023-10-16 RX ORDER — FLUTICASONE FUROATE, UMECLIDINIUM BROMIDE AND VILANTEROL TRIFENATATE 200; 62.5; 25 UG/1; UG/1; UG/1
1 POWDER RESPIRATORY (INHALATION) DAILY
Qty: 3 EACH | Refills: 3 | Status: SHIPPED | OUTPATIENT
Start: 2023-10-16

## 2023-10-16 NOTE — TELEPHONE ENCOUNTER
Pt needs a Rx for Trelegy faxed to [address] [place] [address] at [phone]. Rx placed on Dr. Ben Apgar desk for her signature. Pt last seen by Dr. Mel Berry on 6-15-23. Per Dr. Ben Apgar note:   Trelegy was acceptable though noted increased urinary symptoms now back on Breo and Spiriva remains with dyspnea despite medications and O2   Call placed to daughter and she states that pt has only ever used Trelegy. No script for breo and spiriva. Pt in [facility]. Needs financial assistance from [address] [place] [address]. Rx signed by Dr. Mel Berry and faxed to [address] [place] [address] as requested.

## 2023-12-29 ENCOUNTER — OFFICE VISIT (OUTPATIENT)
Facility: CLINIC | Age: 78
End: 2023-12-29
Payer: MEDICARE

## 2023-12-29 VITALS
BODY MASS INDEX: 24.15 KG/M2 | RESPIRATION RATE: 16 BRPM | DIASTOLIC BLOOD PRESSURE: 78 MMHG | WEIGHT: 123 LBS | OXYGEN SATURATION: 97 % | HEIGHT: 60 IN | SYSTOLIC BLOOD PRESSURE: 118 MMHG | HEART RATE: 61 BPM

## 2023-12-29 DIAGNOSIS — R91.1 PULMONARY NODULE: Primary | ICD-10-CM

## 2023-12-29 PROCEDURE — 99214 OFFICE O/P EST MOD 30 MIN: CPT | Performed by: INTERNAL MEDICINE

## 2023-12-29 RX ORDER — ROPINIROLE 0.5 MG/1
0.5 TABLET, FILM COATED ORAL NIGHTLY
Qty: 30 TABLET | Refills: 0 | Status: SHIPPED | OUTPATIENT
Start: 2023-12-29 | End: 2024-01-28

## 2023-12-29 NOTE — PATIENT INSTRUCTIONS
-Plan:   - begin requip every night for restless leg syndrome               - consider using 1/2 dose of the albuterol in nebulizer as needed               - consider getting mamms               - consider more exercise - try to check O2 sats with exercise please                           - see me in 6 months -- after CT scan   -  Alvaro Garcia MD  Pulmonary Medicine  22.26.13

## 2024-02-12 ENCOUNTER — APPOINTMENT (OUTPATIENT)
Dept: HEMATOLOGY/ONCOLOGY | Facility: HOSPITAL | Age: 79
End: 2024-02-12
Attending: INTERNAL MEDICINE
Payer: MEDICARE

## 2024-02-12 ENCOUNTER — TELEPHONE (OUTPATIENT)
Dept: HEMATOLOGY/ONCOLOGY | Facility: HOSPITAL | Age: 79
End: 2024-02-12

## 2024-02-12 DIAGNOSIS — Z17.0 MALIGNANT NEOPLASM OF UPPER-OUTER QUADRANT OF RIGHT BREAST IN FEMALE, ESTROGEN RECEPTOR POSITIVE  (HCC): Primary | ICD-10-CM

## 2024-02-12 DIAGNOSIS — C50.411 MALIGNANT NEOPLASM OF UPPER-OUTER QUADRANT OF RIGHT BREAST IN FEMALE, ESTROGEN RECEPTOR POSITIVE  (HCC): Primary | ICD-10-CM

## 2024-02-12 NOTE — TELEPHONE ENCOUNTER
Daughter called to cancel todays appts mom is in the hospital at [facility]. adela

## 2024-02-29 ENCOUNTER — HOSPITAL ENCOUNTER (EMERGENCY)
Facility: HOSPITAL | Age: 79
Discharge: HOME | End: 2024-02-29
Attending: EMERGENCY MEDICINE
Payer: MEDICARE

## 2024-02-29 ENCOUNTER — APPOINTMENT (OUTPATIENT)
Dept: CT IMAGING | Facility: HOSPITAL | Age: 79
End: 2024-02-29
Attending: EMERGENCY MEDICINE
Payer: MEDICARE

## 2024-02-29 VITALS
DIASTOLIC BLOOD PRESSURE: 87 MMHG | BODY MASS INDEX: 22.78 KG/M2 | HEIGHT: 60 IN | TEMPERATURE: 98 F | WEIGHT: 116 LBS | HEART RATE: 72 BPM | OXYGEN SATURATION: 97 % | RESPIRATION RATE: 22 BRPM | SYSTOLIC BLOOD PRESSURE: 142 MMHG

## 2024-02-29 DIAGNOSIS — R10.9 ABDOMINAL PAIN, ACUTE: Primary | ICD-10-CM

## 2024-02-29 PROBLEM — K57.92 ACUTE DIVERTICULITIS OF INTESTINE: Status: ACTIVE | Noted: 2024-02-10

## 2024-02-29 PROBLEM — K63.2 INTESTINAL FISTULA: Status: ACTIVE | Noted: 2024-02-10

## 2024-02-29 PROBLEM — R10.30 LOWER ABDOMINAL PAIN: Status: ACTIVE | Noted: 2024-02-29

## 2024-02-29 PROBLEM — K57.30 DIVERTICULOSIS OF LARGE INTESTINE WITHOUT PERFORATION OR ABSCESS WITHOUT BLEEDING: Status: ACTIVE | Noted: 2024-02-29

## 2024-02-29 PROBLEM — I25.10 CORONARY ARTERY DISEASE INVOLVING NATIVE CORONARY ARTERY: Status: ACTIVE | Noted: 2024-02-29

## 2024-02-29 PROBLEM — C50.419 MALIGNANT NEOPLASM OF UPPER-OUTER QUADRANT OF FEMALE BREAST (HCC): Status: ACTIVE | Noted: 2024-02-29

## 2024-02-29 LAB
ALBUMIN SERPL-MCNC: 3.9 G/DL (ref 3.4–5)
ALBUMIN/GLOB SERPL: 1.1 {RATIO} (ref 1–2)
ALP LIVER SERPL-CCNC: 83 U/L
ALT SERPL-CCNC: 36 U/L
ANION GAP SERPL CALC-SCNC: 4 MMOL/L (ref 0–18)
APTT PPP: 29.8 SECONDS (ref 23.3–35.6)
AST SERPL-CCNC: 32 U/L (ref 15–37)
BASOPHILS # BLD AUTO: 0.05 X10(3) UL (ref 0–0.2)
BASOPHILS NFR BLD AUTO: 0.6 %
BILIRUB SERPL-MCNC: 0.4 MG/DL (ref 0.1–2)
BILIRUB UR QL STRIP.AUTO: NEGATIVE
BUN BLD-MCNC: 11 MG/DL (ref 9–23)
CALCIUM BLD-MCNC: 9.6 MG/DL (ref 8.5–10.1)
CHLORIDE SERPL-SCNC: 105 MMOL/L (ref 98–112)
CLARITY UR REFRACT.AUTO: CLEAR
CO2 SERPL-SCNC: 28 MMOL/L (ref 21–32)
COLOR UR AUTO: COLORLESS
CREAT BLD-MCNC: 0.77 MG/DL
EGFRCR SERPLBLD CKD-EPI 2021: 78 ML/MIN/1.73M2 (ref 60–?)
EOSINOPHIL # BLD AUTO: 0.13 X10(3) UL (ref 0–0.7)
EOSINOPHIL NFR BLD AUTO: 1.5 %
ERYTHROCYTE [DISTWIDTH] IN BLOOD BY AUTOMATED COUNT: 13 %
GLOBULIN PLAS-MCNC: 3.6 G/DL (ref 2.8–4.4)
GLUCOSE BLD-MCNC: 97 MG/DL (ref 70–99)
GLUCOSE UR STRIP.AUTO-MCNC: NORMAL MG/DL
HCT VFR BLD AUTO: 42.9 %
HGB BLD-MCNC: 14.6 G/DL
IMM GRANULOCYTES # BLD AUTO: 0.01 X10(3) UL (ref 0–1)
IMM GRANULOCYTES NFR BLD: 0.1 %
INR BLD: 0.93 (ref 0.8–1.2)
KETONES UR STRIP.AUTO-MCNC: NEGATIVE MG/DL
LEUKOCYTE ESTERASE UR QL STRIP.AUTO: NEGATIVE
LIPASE SERPL-CCNC: 36 U/L (ref 13–75)
LYMPHOCYTES # BLD AUTO: 2.16 X10(3) UL (ref 1–4)
LYMPHOCYTES NFR BLD AUTO: 24.9 %
MCH RBC QN AUTO: 32 PG (ref 26–34)
MCHC RBC AUTO-ENTMCNC: 34 G/DL (ref 31–37)
MCV RBC AUTO: 94.1 FL
MONOCYTES # BLD AUTO: 0.62 X10(3) UL (ref 0.1–1)
MONOCYTES NFR BLD AUTO: 7.1 %
NEUTROPHILS # BLD AUTO: 5.71 X10 (3) UL (ref 1.5–7.7)
NEUTROPHILS # BLD AUTO: 5.71 X10(3) UL (ref 1.5–7.7)
NEUTROPHILS NFR BLD AUTO: 65.8 %
NITRITE UR QL STRIP.AUTO: NEGATIVE
OSMOLALITY SERPL CALC.SUM OF ELEC: 283 MOSM/KG (ref 275–295)
PH UR STRIP.AUTO: 5 [PH] (ref 5–8)
PLATELET # BLD AUTO: 274 10(3)UL (ref 150–450)
POTASSIUM SERPL-SCNC: 3.6 MMOL/L (ref 3.5–5.1)
PROT SERPL-MCNC: 7.5 G/DL (ref 6.4–8.2)
PROT UR STRIP.AUTO-MCNC: NEGATIVE MG/DL
PROTHROMBIN TIME: 12.4 SECONDS (ref 11.6–14.8)
RBC # BLD AUTO: 4.56 X10(6)UL
RBC UR QL AUTO: NEGATIVE
SODIUM SERPL-SCNC: 137 MMOL/L (ref 136–145)
SP GR UR STRIP.AUTO: 1 (ref 1–1.03)
UROBILINOGEN UR STRIP.AUTO-MCNC: NORMAL MG/DL
WBC # BLD AUTO: 8.7 X10(3) UL (ref 4–11)

## 2024-02-29 PROCEDURE — 85025 COMPLETE CBC W/AUTO DIFF WBC: CPT | Performed by: EMERGENCY MEDICINE

## 2024-02-29 PROCEDURE — 74177 CT ABD & PELVIS W/CONTRAST: CPT | Performed by: EMERGENCY MEDICINE

## 2024-02-29 PROCEDURE — 96360 HYDRATION IV INFUSION INIT: CPT

## 2024-02-29 PROCEDURE — 83690 ASSAY OF LIPASE: CPT | Performed by: EMERGENCY MEDICINE

## 2024-02-29 PROCEDURE — 99284 EMERGENCY DEPT VISIT MOD MDM: CPT

## 2024-02-29 PROCEDURE — 96361 HYDRATE IV INFUSION ADD-ON: CPT

## 2024-02-29 PROCEDURE — 85610 PROTHROMBIN TIME: CPT | Performed by: EMERGENCY MEDICINE

## 2024-02-29 PROCEDURE — 80053 COMPREHEN METABOLIC PANEL: CPT | Performed by: EMERGENCY MEDICINE

## 2024-02-29 PROCEDURE — 85730 THROMBOPLASTIN TIME PARTIAL: CPT | Performed by: EMERGENCY MEDICINE

## 2024-02-29 PROCEDURE — 81003 URINALYSIS AUTO W/O SCOPE: CPT | Performed by: EMERGENCY MEDICINE

## 2024-02-29 RX ORDER — DICYCLOMINE HCL 20 MG
20 TABLET ORAL 4 TIMES DAILY PRN
Qty: 15 TABLET | Refills: 0 | Status: SHIPPED | OUTPATIENT
Start: 2024-02-29

## 2024-02-29 RX ORDER — SODIUM CHLORIDE 9 MG/ML
1000 INJECTION, SOLUTION INTRAVENOUS ONCE
Status: COMPLETED | OUTPATIENT
Start: 2024-02-29 | End: 2024-02-29

## 2024-02-29 RX ORDER — DOCUSATE SODIUM 100 MG/1
100 CAPSULE, LIQUID FILLED ORAL 2 TIMES DAILY
Qty: 20 CAPSULE | Refills: 0 | Status: SHIPPED | OUTPATIENT
Start: 2024-02-29

## 2024-02-29 NOTE — ED INITIAL ASSESSMENT (HPI)
Patient to ER w/ complaints of LLQ abd pain & nausea. Recent admission to [facility] for diverticulitis.

## 2024-02-29 NOTE — ED PROVIDER NOTES
Patient Seen in: [facility] Emergency Department      History     Chief Complaint   Patient presents with    Abdomen/Flank Pain     Stated Complaint: octavio pain    Subjective:   HPI    Patient is an 78-year-old female presents emergency room with a history of left lower quadrant abdominal pain and nausea which has been ongoing intermittently for the last few weeks.  The patient had a recent admit to [facility] for diverticulitis earlier this month for which she was treated with Cipro and Flagyl as per patient's family at the bedside is getting independent history then she was sent home with a prescription for Augmentin which she has since finished.  The patient initially was feeling better after antibiotics but now has had some return of some left-sided abdominal pain which feels similar to what she experienced when she had diverticulitis.  Patient has had no history of any fever.  Patient also has had some constipation.   The patient denies any vomiting.  Patient denies history of any urinary complaints.  Patient denies history of any fall or trauma.  The patient denies history of any other somatic complaints or discomfort at this time.    Objective:   Past Medical History:   Diagnosis Date    Abdominal distention     Abdominal pain     Acute, but ill-defined, cerebrovascular disease     Anemia     Anxiety     Arthritis     Back pain     Back problem     Belching     Bloating     Breast cancer (HCC) 2017    Chest pain     Chest pain on exertion     Constipation     COPD (chronic obstructive pulmonary disease) ([facility])     Dizziness     Easy bruising     Esophageal reflux     Exposure to medical diagnostic radiation     Fatigue     Flatulence/gas pain/belching     Hearing loss     Heartburn     Hemorrhoids     High blood pressure     High cholesterol     History of blood transfusion     Indigestion     Irregular bowel habits     Loss of appetite     Nausea     Night sweats     Osteoporosis     Personal  history of antineoplastic chemotherapy 2017    Rash     Scoliosis     Shortness of breath     Sleep disturbance     Stroke (HCC)     TIA 2016    Uncomfortable fullness after meals     Visual impairment     glasses              Past Surgical History:   Procedure Laterality Date    BYPASS SURGERY      CHEMOTHERAPY  2017    COLONOSCOPY N/A 2019    Procedure: COLONOSCOPY;  Surgeon: Farhan Zabala DO;  Location:  ENDOSCOPY    IR PORT A CATH PROCEDURE Left 2017    LUMPECTOMY RIGHT  2017    [facility]    NEEDLE BIOPSY RIGHT  2017    US guided bx    NEEDLE BIOPSY RIGHT Right 2017    US guided bx - [facility]    RADIATION RIGHT  2017    SENT LYMPH NODE BIOPSY      TUBAL LIGATION                  Social History     Socioeconomic History    Marital status:     Number of children: 6   Tobacco Use    Smoking status: Former     Packs/day: 1.00     Years: 40.00     Additional pack years: 0.00     Total pack years: 40.00     Types: Cigarettes     Quit date: 2017     Years since quittin.5    Smokeless tobacco: Former     Quit date: 2013   Substance and Sexual Activity    Alcohol use: Not Currently     Alcohol/week: 2.0 standard drinks of alcohol     Types: 2 Glasses of wine per week    Drug use: No   Other Topics Concern    Caffeine Concern Yes     Comment: COFFEE    Exercise No              Review of Systems    Positive for stated complaint: octavio pain  Other systems are as noted in HPI.  Constitutional and vital signs reviewed.      All other systems reviewed and negative except as noted above.    Physical Exam     ED Triage Vitals [24 1422]   /87   Pulse 72   Resp 22   Temp 97.5 °F (36.4 °C)   Temp src Temporal   SpO2 97 %   O2 Device Nasal cannula       Current:/87   Pulse 72   Temp 97.5 °F (36.4 °C) (Temporal)   Resp 22   Ht 152.4 cm (5')   Wt 52.6 kg   SpO2 97%   BMI 22.65 kg/m²         Physical Exam    GENERAL: Well-developed, well-nourished female sitting up  breathing easily in no apparent distress.  Patient is nontoxic in appearance.  HEENT: Head is normocephalic, atraumatic. Pupils are 4 mm equally round and reactive to light. Oropharynx is clear. Mucous membranes are moist.  LUNGS: Clear to auscultation bilaterally with no wheeze. There is good equal air entry bilaterally.  HEART: Regular rate and rhythm. Normal S1, S2 no S3, or S4. No murmur.  ABDOMEN: There is mild focal tenderness to palpation appreciated to the left side of the abdomen only with no other focal tenderness to palpation pressure.. There is no guarding, no rebound, no mass, and no organomegaly appreciated. There is normoactive bowel sounds.   EXTREMITIES: There is no cyanosis, clubbing, or edema appreciated. Pulses are 2+ and equal in all 4 extremities.  NEURO: Patient is awake, alert and oriented to time place and person. Motor strength is 5 over 5 in all 4 extremities. There are no gross motor or sensory deficits appreciated.  Patient answering all questions appropriately.        ED Course     Labs Reviewed   URINALYSIS WITH CULTURE REFLEX - Abnormal; Notable for the following components:       Result Value    Urine Color Colorless (*)     All other components within normal limits   COMP METABOLIC PANEL (14) - Normal   LIPASE - Normal   PTT, ACTIVATED - Normal   PROTHROMBIN TIME (PT) - Normal   CBC WITH DIFFERENTIAL WITH PLATELET    Narrative:     The following orders were created for panel order CBC With Differential With Platelet.  Procedure                               Abnormality         Status                     ---------                               -----------         ------                     CBC W/ DIFFERENTIAL[[id number]]                              Final result                 Please view results for these tests on the individual orders.   RAINBOW DRAW LAVENDER   RAINBOW DRAW LIGHT GREEN   RAINBOW DRAW BLUE   CBC W/ DIFFERENTIAL             CT ABDOMEN+PELVIS(CONTRAST  ONLY)(CPT=74177)    Result Date: 2/29/2024  CONCLUSION:  1. There is diverticulosis of colon.  There is no CT evidence of diverticulitis. 2. Diffuse aortic and iliac atherosclerosis is noted.  There is ectasia of the infrarenal abdominal aorta which measures up to 2.8 cm (previously 2.5 cm).  There is also significant atherosclerotic disease and common iliac arteries bilaterally that may cause 50% stenosis bilaterally    LOCATION:  [facility]   Dictated by (CST): Shan Garcia MD on 2/29/2024 at 5:53 PM     Finalized by (CST): Shan Garcia MD on 2/29/2024 at 6:03 PM               MDM          17:06 patient sitting back and breathing easily in no apparent distress this time.  Patient with no other complaints or discomfort at this time.  She has been up and ambulatory in the emergency room and steady gait and no ataxia.  Patient awaiting CT scan at this time and patient and patient's family updated regarding lab work at this time.  Awaiting CT scan at this time  18:29 patient sitting back and breathing easily in no apparent distress time.  Patient is been up and ambulatory in the emergency room and steady gait and no ataxia.  Patient does not wish to be admitted to the hospital she wants go home at this time.  Patient patient family made aware of the CT findings regarding her aorta and the need for follow-up with her primary care doctor regarding these findings.  The patient did have similar findings regarding her aorta on the CT scan that was done on the February 9 as per my review of outpatient records.  Patient will be discharged home at this time      Patient had an IV line established blood work drawn including CBC, chemistries, BUN/creatinine, and blood sugar all of which are unremarkable.  Liver function test and lipase found to be negative.  Urinalysis is unremarkable.  Coags are unremarkable.  The patient was given IV fluid in the emergency room.  Patient offered pain medication which she declined at this time.   Patient is sitting back and breathing easily in no apparent distress at this time.  The patient underwent CT scan with results as noted above.  There is no evidence of any diverticulitis at this time.  Patient was made aware of the findings on her CT scan regarding her aorta and the need for follow-up with her primary care doctor regarding this.  There is no change in the findings on CT scan regarding her aorta compared to a study that she had done back earlier this month as an outpatient which I personally reviewed here in the emergency room through medical records.  Patient was up and ambulatory in the emergency room with a steady gait and no ataxia.  Patient with no other new complaints at this time.  Patient was offered admission to the hospital at this time but she is declining and wants to go home at this time.  The patient will be instructed to adhere to a bland diet at home given prescription for Colace and for Bentyl for home.  Patient instructed to return to the ER immediately if symptoms worsen or if any other problems arise.  Patient discharged home with no further complaints at this time                           Medical Decision Making      Disposition and Plan     Clinical Impression:  1. Abdominal pain, acute         Disposition:  Discharge  2/29/2024  6:32 pm    Follow-up:  Jaylen Calhoun MD  [address] [place] [id number]  [phone]    Follow up in 2 day(s)  or call your md for follow up this week          Medications Prescribed:  Discharge Medication List as of 2/29/2024  6:33 PM        START taking these medications    Details   docusate sodium 100 MG Oral Cap Take 1 capsule (100 mg total) by mouth 2 (two) times daily., Print, Disp-20 capsule, R-0      dicyclomine 20 MG Oral Tab Take 1 tablet (20 mg total) by mouth 4 (four) times daily as needed., Print, Disp-15 tablet, R-0

## 2024-03-01 NOTE — DISCHARGE INSTRUCTIONS
Rest at home  Encourage fluids and a bland diet at home  You will need follow up regarding your aorta findings on CT with your primary MD  Return to the ER if symptoms worsen or if any other problems arise

## 2024-03-20 ENCOUNTER — OFFICE VISIT (OUTPATIENT)
Dept: HEMATOLOGY/ONCOLOGY | Facility: HOSPITAL | Age: 79
End: 2024-03-20
Attending: INTERNAL MEDICINE
Payer: MEDICARE

## 2024-03-20 VITALS
SYSTOLIC BLOOD PRESSURE: 143 MMHG | OXYGEN SATURATION: 93 % | RESPIRATION RATE: 16 BRPM | WEIGHT: 114 LBS | TEMPERATURE: 97 F | BODY MASS INDEX: 22.38 KG/M2 | HEIGHT: 60 IN | DIASTOLIC BLOOD PRESSURE: 87 MMHG | HEART RATE: 66 BPM

## 2024-03-20 DIAGNOSIS — M81.6 LOCALIZED OSTEOPOROSIS WITHOUT CURRENT PATHOLOGICAL FRACTURE: ICD-10-CM

## 2024-03-20 DIAGNOSIS — D50.0 IRON DEFICIENCY ANEMIA SECONDARY TO BLOOD LOSS (CHRONIC): ICD-10-CM

## 2024-03-20 DIAGNOSIS — C50.411 MALIGNANT NEOPLASM OF UPPER-OUTER QUADRANT OF RIGHT BREAST IN FEMALE, ESTROGEN RECEPTOR POSITIVE (HCC): Primary | ICD-10-CM

## 2024-03-20 DIAGNOSIS — K57.30 DIVERTICULOSIS OF LARGE INTESTINE WITHOUT PERFORATION OR ABSCESS WITHOUT BLEEDING: ICD-10-CM

## 2024-03-20 DIAGNOSIS — D50.0 IRON DEFICIENCY ANEMIA SECONDARY TO BLOOD LOSS (CHRONIC): Primary | ICD-10-CM

## 2024-03-20 DIAGNOSIS — Z17.0 MALIGNANT NEOPLASM OF UPPER-OUTER QUADRANT OF RIGHT BREAST IN FEMALE, ESTROGEN RECEPTOR POSITIVE (HCC): Primary | ICD-10-CM

## 2024-03-20 DIAGNOSIS — J42 CHRONIC BRONCHITIS, UNSPECIFIED CHRONIC BRONCHITIS TYPE (HCC): ICD-10-CM

## 2024-03-20 LAB
ALBUMIN SERPL-MCNC: 3.9 G/DL (ref 3.4–5)
ALBUMIN/GLOB SERPL: 1.1 {RATIO} (ref 1–2)
ALP LIVER SERPL-CCNC: 82 U/L
ALT SERPL-CCNC: 30 U/L
ANION GAP SERPL CALC-SCNC: 5 MMOL/L (ref 0–18)
AST SERPL-CCNC: 21 U/L (ref 15–37)
BASOPHILS # BLD AUTO: 0.05 X10(3) UL (ref 0–0.2)
BASOPHILS NFR BLD AUTO: 0.8 %
BILIRUB SERPL-MCNC: 0.8 MG/DL (ref 0.1–2)
BUN BLD-MCNC: 8 MG/DL (ref 9–23)
CALCIUM BLD-MCNC: 9.1 MG/DL (ref 8.5–10.1)
CHLORIDE SERPL-SCNC: 106 MMOL/L (ref 98–112)
CO2 SERPL-SCNC: 28 MMOL/L (ref 21–32)
CREAT BLD-MCNC: 0.87 MG/DL
DEPRECATED HBV CORE AB SER IA-ACNC: 29.9 NG/ML
EGFRCR SERPLBLD CKD-EPI 2021: 67 ML/MIN/1.73M2 (ref 60–?)
EOSINOPHIL # BLD AUTO: 0.08 X10(3) UL (ref 0–0.7)
EOSINOPHIL NFR BLD AUTO: 1.2 %
ERYTHROCYTE [DISTWIDTH] IN BLOOD BY AUTOMATED COUNT: 13.1 %
FASTING STATUS PATIENT QL REPORTED: NO
GLOBULIN PLAS-MCNC: 3.4 G/DL (ref 2.8–4.4)
GLUCOSE BLD-MCNC: 93 MG/DL (ref 70–99)
HCT VFR BLD AUTO: 41.9 %
HGB BLD-MCNC: 14.3 G/DL
IMM GRANULOCYTES # BLD AUTO: 0.01 X10(3) UL (ref 0–1)
IMM GRANULOCYTES NFR BLD: 0.2 %
LYMPHOCYTES # BLD AUTO: 1.58 X10(3) UL (ref 1–4)
LYMPHOCYTES NFR BLD AUTO: 24.5 %
MCH RBC QN AUTO: 31.4 PG (ref 26–34)
MCHC RBC AUTO-ENTMCNC: 34.1 G/DL (ref 31–37)
MCV RBC AUTO: 91.9 FL
MONOCYTES # BLD AUTO: 0.58 X10(3) UL (ref 0.1–1)
MONOCYTES NFR BLD AUTO: 9 %
NEUTROPHILS # BLD AUTO: 4.14 X10 (3) UL (ref 1.5–7.7)
NEUTROPHILS # BLD AUTO: 4.14 X10(3) UL (ref 1.5–7.7)
NEUTROPHILS NFR BLD AUTO: 64.3 %
OSMOLALITY SERPL CALC.SUM OF ELEC: 286 MOSM/KG (ref 275–295)
PLATELET # BLD AUTO: 276 10(3)UL (ref 150–450)
POTASSIUM SERPL-SCNC: 3.5 MMOL/L (ref 3.5–5.1)
PROT SERPL-MCNC: 7.3 G/DL (ref 6.4–8.2)
RBC # BLD AUTO: 4.56 X10(6)UL
SODIUM SERPL-SCNC: 139 MMOL/L (ref 136–145)
WBC # BLD AUTO: 6.4 X10(3) UL (ref 4–11)

## 2024-03-20 PROCEDURE — 96374 THER/PROPH/DIAG INJ IV PUSH: CPT

## 2024-03-20 PROCEDURE — 99214 OFFICE O/P EST MOD 30 MIN: CPT | Performed by: INTERNAL MEDICINE

## 2024-03-20 NOTE — PROGRESS NOTES
Patient is here for follow up for breast cancer and iron deficiency.   She was in ED recently for GI problems and abdominal pain, diarrhea and constipation.  She is following up with GI.  She is still having this problem.   She denies any blood in the stool.    She is due for zometa today.  She has a temporary crown that she was told will last her for 6 months so she can have her zometa.

## 2024-03-20 NOTE — PROGRESS NOTES
Cancer Center Progress Note    Problem List:      Patient Active Problem List   Diagnosis    De Quervain's syndrome (tenosynovitis)    Breast cancer metastasized to axillary lymph node (HCC)    Other specified counseling    Dyspnea on exertion    COPD (chronic obstructive pulmonary disease) (HCC)    Post herpetic neuralgia    Localized osteoporosis without current pathological fracture    Iron deficiency anemia secondary to blood loss (chronic)    Other fatigue due to anemia    Heartburn    Malignant neoplasm of upper-outer quadrant of right breast in female, estrogen receptor positive (HCC)    Acute diverticulitis of intestine    Coronary artery disease involving native coronary artery    Diverticulosis of large intestine without perforation or abscess without bleeding    Intestinal fistula    Lower abdominal pain    Malignant neoplasm of upper-outer quadrant of female breast (HCC)       Interim History:    Geovanna Butcher presents today for evaluation and management of a diagnosis of right breast cancer and iron deficiency anemia.    She had recent diagnosis of diverticulitis at [facility].She was treated with antibiotics. She was in the [place] [facility] ED on 2/29/2024 with abdominal pain. The CT of the abd/pelvis was negative for diverticulitis. She has an abdominal aneurysm that is stable. She continues to have abdominal pain in the lower abdomen. She has no fever or sweats. She has no other pain.     She continues to be on home oxygen 2L. She follows with Dr. Fisher. She has severe emphysema changes on CT scan. She has no chest pain. She has no leg pain or swelling. She has no fever or sweats.    She had an angioplasty in 6/2021. She had a CABG in 10/2020 at [facility]. She follows with pulmonary.     She had six cycles of TCHP chemotherapy. She completed 6 cycles on 8/7/2017. She completed herceptin in 2/2018. She has completed radiation therapy in 12/2017. She has been on Letrozole.    She had Infed on 8/25/2022. The  HGB is now normal. The ferritin is pending.      Review of Systems:   Constitutional: Negative for anorexia, fatigue, fevers, chills, night sweats and weight loss.  Psychiatric: The patient's mood was calm and appropriate for this visit.  The pertinent positives and negatives were described. All other systems were negative.    PMH/PSH:  Past Medical History:   Diagnosis Date    Abdominal distention     Abdominal pain     Acute, but ill-defined, cerebrovascular disease     Anemia     Anxiety     Arthritis     Back pain     Back problem     Belching     Bloating     Breast cancer (HCC) 2017    Chest pain     Chest pain on exertion     Constipation     COPD (chronic obstructive pulmonary disease) (HCC)     Dizziness     Easy bruising     Esophageal reflux     Exposure to medical diagnostic radiation     Fatigue     Flatulence/gas pain/belching     Hearing loss     Heartburn     Hemorrhoids     High blood pressure     High cholesterol     History of blood transfusion     Indigestion     Irregular bowel habits     Loss of appetite     Nausea     Night sweats     Osteoporosis     Personal history of antineoplastic chemotherapy 08/29/2017    Rash     Scoliosis     Shortness of breath     Sleep disturbance     Stroke (HCC)     TIA 12/2016    Uncomfortable fullness after meals     Visual impairment     glasses       Past Surgical History:   Procedure Laterality Date    BYPASS SURGERY      CHEMOTHERAPY  2017    COLONOSCOPY N/A 05/03/2019    Procedure: COLONOSCOPY;  Surgeon: Farhan Zabala DO;  Location:  ENDOSCOPY    IR PORT A CATH PROCEDURE Left 04/2017    LUMPECTOMY RIGHT  09/2017    [facility]    NEEDLE BIOPSY RIGHT  03/2017    US guided bx    NEEDLE BIOPSY RIGHT Right 04/2017    US guided bx - [facility]    RADIATION RIGHT  2017    SENT LYMPH NODE BIOPSY      TUBAL LIGATION         Family History Reviewed:  Family History   Problem Relation Age of Onset    Other (multiple myeloma) Sister     Breast Cancer Self 71    Stroke Father      Hypertension Mother     Stroke Brother        Allergies:     No Known Allergies    Medications:   docusate sodium 100 MG Oral Cap Take 1 capsule (100 mg total) by mouth 2 (two) times daily. 20 capsule 0    dicyclomine 20 MG Oral Tab Take 1 tablet (20 mg total) by mouth 4 (four) times daily as needed. 15 tablet 0    TRELEGY ELLIPTA 200-62.5-25 MCG/ACT Inhalation Aerosol Powder, Breath Activated Inhale 1 puff into the lungs daily. 3 each 3    torsemide 20 MG Oral Tab Take 1 tablet (20 mg total) by mouth daily.      albuterol 108 (90 Base) MCG/ACT Inhalation Aero Soln Inhale 2 puffs into the lungs every 4 to 6 hours as needed for Wheezing. 1 each 0    LORazepam 0.5 MG Oral Tab Take 1 tablet (0.5 mg total) by mouth 2 (two) times daily.      Potassium Chloride ER 20 MEQ Oral Tab CR Take 1 tablet by mouth daily.      Multiple Vitamins-Minerals (MULTIVITAMIN ADULT) Oral Chew Tab Chew 1 tablet by mouth daily.      TRELEGY ELLIPTA 200-62.5-25 MCG/ACT Inhalation Aerosol Powder, Breath Activated Inhale 1 puff into the lungs in the morning. Rinse mouth after use.. 3 each 1    Irbesartan 75 MG Oral Tab Take 1 tablet (75 mg total) by mouth as needed. Depending on bp      metoprolol succinate  MG Oral Tablet 24 Hr Take 1 tablet (100 mg total) by mouth daily.      atorvastatin 80 MG Oral Tab Take 1 tablet (80 mg total) by mouth nightly.      Clopidogrel Bisulfate 75 MG Oral Tab Take 1 tablet (75 mg total) by mouth daily.      Pantoprazole Sodium 40 MG Oral Tab EC TAKE 1 TABLET(40 MG) BY MOUTH EVERY MORNING BEFORE BREAKFAST 90 tablet 3         Vital Signs:      /87 (BP Location: Left arm, Patient Position: Sitting, Cuff Size: adult)   Pulse 66   Temp 97 °F (36.1 °C) (Temporal)   Resp 16   Ht 1.524 m (5')   Wt 51.7 kg (114 lb)   SpO2 93%   BMI 22.26 kg/m²     Performance Status:  ECOG 0: Fully active, able to carry on all pre-disease performance without restriction     Physical Examination:    Constitutional:  Patient is alert and not in acute distress.  Respiratory: Clear to auscultation and percussion. No rales.  No wheezes.  Cardiovascular: Regular rate and rhythm. No murmurs.  Gastrointestinal: Soft, non tender with good bowel sounds.  Musculoskeletal: No edema. No calf tenderness.  Skin: No suspicious skin lesion, no rash, no ulceration.  Lymphatics: There is no palpable lymphadenopathy throughout in the cervical, supraclavicular, or axillary regions.  Psychiatric: The patient's mood is calm and appropriate for this visit.      Labs reviewed at this visit:       Lab Results   Component Value Date    WBC 6.4 03/20/2024    RBC 4.56 03/20/2024    HGB 14.3 03/20/2024    HCT 41.9 03/20/2024    MCV 91.9 03/20/2024    MCH 31.4 03/20/2024    MCHC 34.1 03/20/2024    RDW 13.1 03/20/2024    .0 03/20/2024     Lab Results   Component Value Date     02/29/2024    K 3.6 02/29/2024     02/29/2024    CO2 28.0 02/29/2024    BUN 11 02/29/2024    CREATSERUM 0.77 02/29/2024    GLU 97 02/29/2024    CA 9.6 02/29/2024    ALKPHO 83 02/29/2024    ALT 36 02/29/2024    AST 32 02/29/2024    BILT 0.4 02/29/2024    ALB 3.9 02/29/2024    TP 7.5 02/29/2024         Component  Ref Range & Units 8/21/23 1127   Ferritin  18.0 - 340.0 ng/mL 63.8       Radiologic imaging reviewed at this visit:    CT Chest on 2/29/2024:  FINDINGS:    LIVER:  No enlargement, atrophy, abnormal density, or significant focal lesion.    BILIARY:  No visible dilatation or calcification.    PANCREAS:  No lesion, fluid collection, ductal dilatation, or atrophy.    SPLEEN:  No enlargement or focal lesion.    KIDNEYS:  Benign cyst right kidney is stable.  ADRENALS:  No mass or enlargement.    AORTA/VASCULAR:  Aorta is diffusely atherosclerotic.  Maximum anterior to posterior by transverse dimensions of ectatic infrarenal abdominal aorta are 2.7 x 2.8 cm respectively (previously 2.5 x 2.4 cm).  There is marked atherosclerosis of common iliac  vessels just below  the level of the bifurcation causing approximately 50% stenosis bilaterally although suboptimally evaluated on routine CT.  RETROPERITONEUM:  No mass or adenopathy.    BOWEL/MESENTERY:  There is diverticulosis of the colon.  There is no CT evidence of diverticulitis.  ABDOMINAL WALL:  No mass or hernia.    URINARY BLADDER:  No visible focal wall thickening, lesion, or calculus.    PELVIC NODES:  No adenopathy.    PELVIC ORGANS:  No visible mass.  Pelvic organs appropriate for patient age.    BONES:  There is degenerative disc disease in lumbar spine and there is levo scoliotic curvature.  LUNG BASES:  No visible pulmonary or pleural disease.    OTHER:  Negative.       Impression   CONCLUSION:    1. There is diverticulosis of colon.  There is no CT evidence of diverticulitis.  2. Diffuse aortic and iliac atherosclerosis is noted.  There is ectasia of the infrarenal abdominal aorta which measures up to 2.8 cm (previously 2.5 cm).  There is also significant atherosclerotic disease and common iliac arteries bilaterally that may  cause 50% stenosis bilaterally           Assessment/Plan:     Infiltrating ductal carcinoma of the upper outer right breast  Right axillary node metastases  ER 10%  OK 0%  HER2 3+  Treatment related diarrhea and abdominal cramping  Treatment related nausea  TCH x 6 cycles completed in 8/2017  Lumpectomy on 9/8/2017 with pathologic CR.  Herceptin completed on 2/14/2018      She has OLEG at this visit. She completed five years of letrozole.  She has a mammogram scheduled for 11/2023. She continues to follow with cardiology and pulmonary. We discussed the issue of mammogram. She wanted to hold off with mammogram screening. I think this is reasonable given her lung and cardiovascular disease.    She has osteoporosis. She is getting zometa 4 mg twice yearly. I will see her in six months.     Iron deficiency anemia with GI blood loss:    She has AVM in the dodenum that were treated. She declined  proceeding with capsule endoscopy. The current HGB and ferritin are adequate. We will repeat this at the next visit.    Osteoporosis:    She is getting zometa.    Peripheral vascular disease with AAA:    I have her info for a local cardiologist for management.      Vikash Chaudhary MD

## 2024-03-20 NOTE — PROGRESS NOTES
Education Record    Learner:  Patient and Family Member    Disease / Diagnosis: pt here for zometa, daughter stated she made follow ups with     Barriers / Limitations:  None   Comments:    Method:  Brief focused   Comments:    General Topics:  Side effects and symptom management   Comments:    Outcome:  Shows understanding   Comments:

## 2024-03-22 ENCOUNTER — HOSPITAL ENCOUNTER (OUTPATIENT)
Dept: ULTRASOUND IMAGING | Age: 79
Discharge: HOME | End: 2024-03-22
Attending: NURSE PRACTITIONER
Payer: MEDICARE

## 2024-03-22 ENCOUNTER — HOSPITAL ENCOUNTER (OUTPATIENT)
Dept: GENERAL RADIOLOGY | Age: 79
Discharge: HOME | End: 2024-03-22
Attending: NURSE PRACTITIONER
Payer: MEDICARE

## 2024-03-22 DIAGNOSIS — R10.13 EPIGASTRIC PAIN: ICD-10-CM

## 2024-03-22 DIAGNOSIS — R11.0 NAUSEA: ICD-10-CM

## 2024-03-22 DIAGNOSIS — R10.32 LEFT LOWER QUADRANT PAIN: ICD-10-CM

## 2024-03-22 DIAGNOSIS — K59.09 CHRONIC CONSTIPATION: ICD-10-CM

## 2024-03-22 PROCEDURE — 74018 RADEX ABDOMEN 1 VIEW: CPT | Performed by: NURSE PRACTITIONER

## 2024-03-22 PROCEDURE — 76700 US EXAM ABDOM COMPLETE: CPT | Performed by: NURSE PRACTITIONER

## 2024-05-21 ENCOUNTER — OFFICE VISIT (OUTPATIENT)
Dept: HEMATOLOGY/ONCOLOGY | Facility: HOSPITAL | Age: 79
End: 2024-05-21
Attending: INTERNAL MEDICINE

## 2024-05-21 VITALS
DIASTOLIC BLOOD PRESSURE: 68 MMHG | OXYGEN SATURATION: 97 % | TEMPERATURE: 98 F | RESPIRATION RATE: 18 BRPM | SYSTOLIC BLOOD PRESSURE: 132 MMHG | HEART RATE: 88 BPM

## 2024-05-21 DIAGNOSIS — D50.0 IRON DEFICIENCY ANEMIA SECONDARY TO BLOOD LOSS (CHRONIC): Primary | ICD-10-CM

## 2024-05-21 PROCEDURE — 96365 THER/PROPH/DIAG IV INF INIT: CPT

## 2024-05-21 NOTE — PROGRESS NOTES
Education Record    Learner:  Patient    Disease / Diagnosis: iron deficiency anemia    Barriers / Limitations:  None   Comments:    Method:  Discussion   Comments:    General Topics:  Plan of care reviewed   Comments:    Outcome:  Shows understanding   Comments:    Patient tolerated infed infusion without issue- left treatment center in stable condition.

## 2024-07-16 ENCOUNTER — HOSPITAL ENCOUNTER (OUTPATIENT)
Dept: CT IMAGING | Facility: HOSPITAL | Age: 79
Discharge: HOME | End: 2024-07-16
Attending: INTERNAL MEDICINE
Payer: MEDICARE

## 2024-07-16 DIAGNOSIS — R91.1 PULMONARY NODULE: ICD-10-CM

## 2024-07-16 PROCEDURE — 71250 CT THORAX DX C-: CPT | Performed by: INTERNAL MEDICINE

## 2024-07-25 ENCOUNTER — OFFICE VISIT (OUTPATIENT)
Facility: CLINIC | Age: 79
End: 2024-07-25
Payer: MEDICARE

## 2024-07-25 VITALS
WEIGHT: 118 LBS | OXYGEN SATURATION: 98 % | BODY MASS INDEX: 23.16 KG/M2 | RESPIRATION RATE: 16 BRPM | SYSTOLIC BLOOD PRESSURE: 134 MMHG | DIASTOLIC BLOOD PRESSURE: 78 MMHG | HEIGHT: 60 IN | HEART RATE: 55 BPM

## 2024-07-25 DIAGNOSIS — J44.9 CHRONIC OBSTRUCTIVE PULMONARY DISEASE, UNSPECIFIED COPD TYPE (HCC): Primary | ICD-10-CM

## 2024-07-25 PROCEDURE — 99214 OFFICE O/P EST MOD 30 MIN: CPT | Performed by: INTERNAL MEDICINE

## 2024-07-25 RX ORDER — FLUTICASONE FUROATE, UMECLIDINIUM BROMIDE AND VILANTEROL TRIFENATATE 200; 62.5; 25 UG/1; UG/1; UG/1
1 POWDER RESPIRATORY (INHALATION) DAILY
Qty: 1 EACH | Refills: 11 | Status: SHIPPED | OUTPATIENT
Start: 2024-07-25

## 2024-07-25 RX ORDER — ALBUTEROL SULFATE 90 UG/1
2 AEROSOL, METERED RESPIRATORY (INHALATION) EVERY 4 HOURS PRN
Qty: 1 EACH | Refills: 11 | Status: SHIPPED | OUTPATIENT
Start: 2024-07-25

## 2024-07-25 NOTE — PATIENT INSTRUCTIONS
-Plan:   - continue  requip every night for restless leg syndrome               - consider using 1/2 dose of the albuterol in nebulizer as needed               - consider more exercise - try to check O2 sats with exercise please - increase O2 to maintain sats greater than 88% -- OK to go up to 4-5l             Vaccines - covid and flu      -                           - see me in 6 months -- after PFTS   -  Mikayla Fisher MD  Pulmonary Medicine  07/25/24

## 2024-07-25 NOTE — PROGRESS NOTES
Pulmonary Progress Note    History of Present Illness:  Geovanna Butcher is a 78 year old female presenting to pulmonary clinic copd  Notes increased dyspnea -   Did rehab- - unable to get to rehab-now  did in [place]  Doing some of the exercises with weights -   Walks outside if able-   2l with treadmill -   sats all good - - 93 % if walking   Hands cold-   trelegy - daily -   Some chest pain- - got nebulizer only used once-   Not using much - rare use rescue -   Not struggling - in the am - only with activity -  -   No coughing -   Had echo and holter - izabela   Fell and had CT brain     Not better not worse -   Did rehab- walks as able now - gets tired- rare cough at most   Has to stop and rest- vaccuuming - -   Rash comes and goes - for 2 years - then returns -   Cant change heart meds   Saw cardio- no changes -     12.23- no UC /ER   Sick few months ago -bad cold -  thinks got antibiotics - and thinks prednisone - though for sciatica   Harder to breath - thinks related to cold weather- when active-   At times rolling in bed and other times able to vacuum- if gets gloomy then gets sad and more shortness of breath   No coughing - thinks cp is gas -- left- at times comes and goes   Had another procedure -- for chest pain- - with patent SVG LVEDP 5mmHg   Trelegy once a day 200-   If takes albuterol - makes HR high -- so avoids - - can't tell if helps breathing -   Has nebulizer - thinks plain albuterol   No coughing -   O2 at rest and at night - 2l - if walking - 3l and has to stop and rest more - had to sit down   At home 93%- at rest   Finished rehab - 9 months - remains with exercise- pushups and walks - not as much - 4000 steps -   7/24- more dyspnea - feels need for more oxygen - abi valentine- -   2 weeks ago very short of breath - was using albuterol - - -   Hosp with diverticulitis at [facility] - January   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- - told not well enough for surgery - - -  Saw dr coronado - aneurysm  recommended  ryan -   Switched back to [facility] now   [facility] -   Desats to 80s -on 2l with new POC         Social History:   Social History     Socioeconomic History    Marital status:     Number of children: 6   Tobacco Use    Smoking status: Former     Current packs/day: 0.00     Average packs/day: 1 pack/day for 40.0 years (40.0 ttl pk-yrs)     Types: Cigarettes     Start date: 1977     Quit date: 2017     Years since quittin.9     Passive exposure: Past    Smokeless tobacco: Former     Quit date: 2013   Substance and Sexual Activity    Alcohol use: Not Currently     Alcohol/week: 2.0 standard drinks of alcohol     Types: 2 Glasses of wine per week    Drug use: No   Other Topics Concern    Caffeine Concern Yes     Comment: COFFEE    Exercise No        Medications:   Current Outpatient Medications   Medication Sig Dispense Refill    famotidine 40 MG Oral Tab Take 1 tablet (40 mg total) by mouth at bedtime. 90 tablet 3    docusate sodium 100 MG Oral Cap Take 1 capsule (100 mg total) by mouth 2 (two) times daily. 20 capsule 0    dicyclomine 20 MG Oral Tab Take 1 tablet (20 mg total) by mouth 4 (four) times daily as needed. 15 tablet 0    TRELEGY ELLIPTA 200-62.5-25 MCG/ACT Inhalation Aerosol Powder, Breath Activated Inhale 1 puff into the lungs daily. 3 each 3    torsemide 20 MG Oral Tab Take 1 tablet (20 mg total) by mouth daily.      rOPINIRole 0.5 MG Oral Tab Take 1 tablet (0.5 mg total) by mouth 3 (three) times daily.      albuterol 108 (90 Base) MCG/ACT Inhalation Aero Soln Inhale 2 puffs into the lungs every 4 to 6 hours as needed for Wheezing. 1 each 0    LORazepam 0.5 MG Oral Tab Take 1 tablet (0.5 mg total) by mouth 2 (two) times daily.      Potassium Chloride ER 20 MEQ Oral Tab CR Take 1 tablet by mouth daily.      Multiple Vitamins-Minerals (MULTIVITAMIN ADULT) Oral Chew Tab Chew 1 tablet by mouth daily.      TRELEGY ELLIPTA 200-62.5-25 MCG/ACT Inhalation Aerosol Powder, Breath  Activated Inhale 1 puff into the lungs in the morning. Rinse mouth after use.. 3 each 1    Irbesartan 75 MG Oral Tab Take 1 tablet (75 mg total) by mouth as needed. Depending on bp      metoprolol succinate  MG Oral Tablet 24 Hr Take 1 tablet (100 mg total) by mouth daily.      atorvastatin 80 MG Oral Tab Take 1 tablet (80 mg total) by mouth nightly.      Clopidogrel Bisulfate 75 MG Oral Tab Take 1 tablet (75 mg total) by mouth daily.      Pantoprazole Sodium 40 MG Oral Tab EC TAKE 1 TABLET(40 MG) BY MOUTH EVERY MORNING BEFORE BREAKFAST 90 tablet 3       Review of Systems:    Weight stable - down 2 # - down another -5#   Eating well     Denies any difficulty swallowing or significant coughing while eating--  Denies any specific chest pain-- told gas   Thinks continuing to lose height- thinks ongoing losing -- was to have bone denisty   - no swelling at all   Denies any significant reflux or heartburn  Has had no lower extremity edema denies claudication symptoms  Denies claudication   Overall thinks sleeping with difficulty 1-2 nights per week -   Falls asleep then awakenings -- frequent - using O2   Increased leg jumping at night --much better with Requip      Physical Exam:  /78 (BP Location: Right arm, Patient Position: Sitting, Cuff Size: adult)   Pulse 55   Resp 16   Ht 5' (1.524 m)   Wt 118 lb (53.5 kg)   SpO2 98%   BMI 23.05 kg/m²    Constitutional: comfortable . No acute distress.   HEENT: Head NC/AT. PEERL. Throat is clear posterior thrush noted mild  Increasing KS   Cardio: .  Regular rate and rhythm short murmurs noted  Respiratory: Diminished tones with minimal crackles at both bases no auscultated wheeze good air entry- less crackles  GI:  Abd soft, non-tender.  Extremities: No clubbing .  No evidence of edema no tenderness  Neurologic: A&Ox3. No gross motor deficits.  Skin: Warm, dry.no rashes or hives noted   Lymphatic: No cervical or supraclavicular lymphadenopathy.  No JVD at 90  degrees  Psych: Calm, cooperative. Pleasant affect.    Results: reviewed     Assessment/Plan:  #GLO  Refuses sleep study  Remains on O2  6.23- remains an issue and remains with fatigue - not willing to discuss   12.23- notes RLS-- had been on requip- agrees to retry -   7/24- takes most nights- and helps       #Coronary artery disease  Increasing dyspnea plans for cath at [facility] but had MRI of the night prior  CABG for triple-vessel disease October 2020  Follows with Dr. Contreras at [place]  5/21 ordered PET scan reports stable remains with dyspnea on exertion  8/21 cardiac PET with reversible anterior ischemia--repeat cath and told too small  Denies angina  8/22 no new issues  1/23 saw Dr. Contreras and was assessed with increasing dyspnea states had negative Holter and negative echo and was told heart was fine  6.23- no changes   12.23 recent re-cath - with patent SVGs- and normal ef reportedly   7/24- no new issues - dr contreras -no changes on recent visit        #Pulmonary hypertension  Etiology unclear questionably chemo related now improved  Remains with stable fluid status  PFTs 4/2019 without significant disease though severely reduced DLCO  Echo 7/2019 with improved PAS 43 mmHg down to 33 mmHg  1/21 now status post CABG follow-up with PAS of 35 mmHg and EF 55%-  8/21 no fluid issues and no diuretic  Recent echo at [place] with pulmonary hypertension mild lightheadedness-reports several caths at [place]  Remains without fluid issues  6.23- no fluid issues   7/24 repeat cath in December with LVEDP of 5-stable fluid status      #Lung cancer screening  50-pack-year plus quit 2017  4/2019 without issue  Remains with yearly screening  8/22 CT January-2022 for dyspnea no obvious lesion  1/23 screening CT with 3.5 mm left upper lobe new nodule plan for short interval follow-up-discussed with patient and son at length  6/2312-fckhmq-tc with resolved nodule no new issues-for yearly  7/24 yearly with no worrisome lesions stable  subpleural left lower lobe-plan for yearly  Lung Cancer Counseling and Shared Decision Making Session in an Asymptomatic Smoker/Former Smoker   Geovanna Butcher is a 78 year old female without current symptoms of lung cancer.  History   Smoking Status    Former    Types: Cigarettes   Smokeless Tobacco    Former    Quit date: 9/27/2013        She received information on the importance of adherence to annual lung cancer Low Dose CT (LDCT) screening, the impact of her comorbidities and her ability or willingness to undergo diagnosis and treatment. she is in agreement and an order will be placed for CT LUNG LD SCREENING(CPT=71271).    We counseled the importance of maintaining cigarette smoking abstinence if she is a former smoker and the importance of smoking cessation if she is a current smoker and we discussed and furnished information about tobacco cessation interventions.  7.24- knows for yearly         #GE reflux  Resumed PPI denies breakthrough  Told needs to take PPI daily for need for nonsteroidal for her back pain  3/21 still with episodes of breakthrough now on sucralfate taking 3 times daily  8/21 complains of constipation then diarrhea-Daily MiraLAX  1/23 denies any new issues  6.23- remains on ppi daily     #Anemia due to chronic GI blood loss  Hemoglobin to 7.7--found to have a polyp and AVM--refused capsule study at that time  Follows with Dr. Chaudhary  No recent issues      #COPD  Initial flare with viral infection while in [place] requiring bronchoscopy at that time with Candida treated with Diflucan  Ongoing mild airways obstruction better on medications  Chest x-ray clear  PFTs 2019 with hyperinflation without obstruction though FVC was decreased--severely reduced DLCO  Pulmonary O2 from bypass surgery 2020  3/20 1 repeat PFTs with slightly decreased ratio 67% volumes down did not in the box  5/21 Trelegy was acceptable though noted increased urinary symptoms now back on Breo and Spiriva remains with  dyspnea despite medications and O2  8/22 with prolonged flare December/January--CT was negative-finally better though states had gained 20 pounds with steroids-remains O2 dependent  Had COVID while in [place]  1/23 continues to note significant dyspnea on exertion and a worsening pattern did note significant benefit from rehab with plans to return to exercise as much as possible  Follow for need for repeat PFTs and check for desaturation  6.23- - walking some - able to be in the pool -encouraged to exercise more continue present management  7/24 now with increasing dyspnea and decreasing levels of activity-plan to titrate O2 upward continue full ICS/LABA/LAMA  Plan to check PFTs to see if possible candidate for valves        #Pulmonary nodule  3.5 mm left upper lobe new from 1/13/2022  6.23 - resolved on CT     # rash -  Saw allergist- had blood work -  ? ozog   No clue - Claritin     # breast cancer   Due for mamms        -Plan:   - continue  requip every night for restless leg syndrome               - consider using 1/2 dose of the albuterol in nebulizer as needed               - consider more exercise - try to check O2 sats with exercise please - increase O2 to maintain sats greater than 88% -- OK to go up to 4-5l            -                           - see me in 6 months -- after PFTS   -  Mikayla Fisher MD  Pulmonary Medicine  07/25/24

## 2024-07-31 ENCOUNTER — TELEPHONE (OUTPATIENT)
Facility: CLINIC | Age: 79
End: 2024-07-31

## 2024-07-31 NOTE — TELEPHONE ENCOUNTER
Received a request from Dr. Fisher to arrange for O2 for trip to [place].  Call placed to [facility] to arrange.  Per [employer], pt needs to call them at [phone] to give them the particulars of her travel plans.  Message left for pt to call office.

## 2024-09-04 ENCOUNTER — TELEPHONE (OUTPATIENT)
Facility: CLINIC | Age: 79
End: 2024-09-04

## 2024-09-04 NOTE — TELEPHONE ENCOUNTER
Patient needs a refill of Ropinirole. She is also wondering if she can double her dosage because 1 doesn't seem to be doing anything for her.

## 2024-09-04 NOTE — TELEPHONE ENCOUNTER
Patient called, patient requesting a refill of ropinirole and if she can take 2 tabs instead of 1 given that just taking one (1) didn't work for her last night, she had to get up and take Tylenol. Patient states she has three (3) tab left, doesn't usually take medication. Patient made aware Dr. Fisher is out of the office. Will notify MD of patient's request once provider is back in the office and contact patient. Patient verbalized understanding.

## 2024-09-09 RX ORDER — ROPINIROLE 0.5 MG/1
TABLET, FILM COATED ORAL
Qty: 60 TABLET | Refills: 1 | Status: SHIPPED | OUTPATIENT
Start: 2024-09-09

## 2024-09-18 ENCOUNTER — OFFICE VISIT (OUTPATIENT)
Dept: HEMATOLOGY/ONCOLOGY | Facility: HOSPITAL | Age: 79
End: 2024-09-18
Attending: INTERNAL MEDICINE
Payer: MEDICARE

## 2024-09-18 VITALS
WEIGHT: 117 LBS | TEMPERATURE: 97 F | SYSTOLIC BLOOD PRESSURE: 164 MMHG | HEIGHT: 60 IN | HEART RATE: 60 BPM | DIASTOLIC BLOOD PRESSURE: 85 MMHG | OXYGEN SATURATION: 95 % | BODY MASS INDEX: 22.97 KG/M2

## 2024-09-18 DIAGNOSIS — D50.0 IRON DEFICIENCY ANEMIA SECONDARY TO BLOOD LOSS (CHRONIC): ICD-10-CM

## 2024-09-18 DIAGNOSIS — D50.0 IRON DEFICIENCY ANEMIA SECONDARY TO BLOOD LOSS (CHRONIC): Primary | ICD-10-CM

## 2024-09-18 DIAGNOSIS — Z17.0 MALIGNANT NEOPLASM OF UPPER-OUTER QUADRANT OF RIGHT BREAST IN FEMALE, ESTROGEN RECEPTOR POSITIVE (HCC): Primary | ICD-10-CM

## 2024-09-18 DIAGNOSIS — C50.411 MALIGNANT NEOPLASM OF UPPER-OUTER QUADRANT OF RIGHT BREAST IN FEMALE, ESTROGEN RECEPTOR POSITIVE (HCC): Primary | ICD-10-CM

## 2024-09-18 DIAGNOSIS — M81.6 LOCALIZED OSTEOPOROSIS WITHOUT CURRENT PATHOLOGICAL FRACTURE: ICD-10-CM

## 2024-09-18 LAB
CALCIUM BLD-MCNC: 9.6 MG/DL (ref 8.7–10.4)
CREAT BLD-MCNC: 0.81 MG/DL
EGFRCR SERPLBLD CKD-EPI 2021: 73 ML/MIN/1.73M2 (ref 60–?)

## 2024-09-18 PROCEDURE — 99214 OFFICE O/P EST MOD 30 MIN: CPT | Performed by: INTERNAL MEDICINE

## 2024-09-18 PROCEDURE — 96374 THER/PROPH/DIAG INJ IV PUSH: CPT

## 2024-09-18 NOTE — PROGRESS NOTES
Education Record    Learner:  Patient and Family Member    Disease / Diagnosis: breast ca    Barriers / Limitations:  None   Comments:    Method:  Discussion   Comments:    General Topics:  Medication, Side effects and symptom management, Plan of care reviewed, and Fall risk and prevention   Comments:    Outcome:  Shows understanding   Comments:    Zometa infusion tolerated well. Discharged ambulatory in stable condition. 6 month f/u appt scheduled - pt will view on [employer].

## 2024-09-18 NOTE — PROGRESS NOTES
Cancer Center Progress Note    Problem List:      Patient Active Problem List   Diagnosis    De Quervain's syndrome (tenosynovitis)    Breast cancer metastasized to axillary lymph node (HCC)    Other specified counseling    Dyspnea on exertion    COPD (chronic obstructive pulmonary disease) (HCC)    Post herpetic neuralgia    Localized osteoporosis without current pathological fracture    Iron deficiency anemia secondary to blood loss (chronic)    Other fatigue due to anemia    Heartburn    Malignant neoplasm of upper-outer quadrant of right breast in female, estrogen receptor positive (HCC)    Acute diverticulitis of intestine    Coronary artery disease involving native coronary artery    Diverticulosis of large intestine without perforation or abscess without bleeding    Intestinal fistula    Lower abdominal pain    Malignant neoplasm of upper-outer quadrant of female breast (HCC)       Interim History:    Geovanna Butcher presents today for evaluation and management of a diagnosis of right breast cancer and iron deficiency anemia.    She has no new comlaints since last vsiit. She continues to follow with pulmonary. She has chronic dyspnea on home oxygen. She continues to be on home oxygen 2L. She follows with Dr. Fisher. She has severe emphysema changes on CT scan. She has no chest pain. She has no leg pain or swelling. She has no fever or sweats.    She had an angioplasty in 6/2021. She had a CABG in 10/2020 at [place]. She follows with pulmonary.     She had six cycles of TCHP chemotherapy. She completed 6 cycles on 8/7/2017. She completed herceptin in 2/2018. She has completed radiation therapy in 12/2017. She has been on Letrozole.    She had Infed on 8/25/2022. The HGB is now normal. The ferritin is pending.      Review of Systems:   Constitutional: Negative for anorexia, fatigue, fevers, chills, night sweats and weight loss.  Psychiatric: The patient's mood was calm and appropriate for this visit.  The  pertinent positives and negatives were described. All other systems were negative.    PMH/PSH:  Past Medical History:    Abdominal distention    Abdominal pain    Acute, but ill-defined, cerebrovascular disease    Anemia    Anxiety    Arthritis    Back pain    Back problem    Belching    Bloating    Breast cancer (HCC)    Chest pain    Chest pain on exertion    Constipation    COPD (chronic obstructive pulmonary disease) (HCC)    Dizziness    Easy bruising    Esophageal reflux    Exposure to medical diagnostic radiation    Fatigue    Flatulence/gas pain/belching    Hearing loss    Heartburn    Hemorrhoids    High blood pressure    High cholesterol    History of blood transfusion    Indigestion    Irregular bowel habits    Loss of appetite    Nausea    Night sweats    Osteoporosis    Personal history of antineoplastic chemotherapy    Rash    Scoliosis    Shortness of breath    Sleep disturbance    Stroke (HCC)    TIA 12/2016    Uncomfortable fullness after meals    Visual impairment    glasses       Past Surgical History:   Procedure Laterality Date    Bypass surgery      Chemotherapy  2017    Colonoscopy N/A 05/03/2019    Procedure: COLONOSCOPY;  Surgeon: Farhan Zabala DO;  Location:  ENDOSCOPY    Ir port a cath procedure Left 04/2017    Lumpectomy right  09/2017    [facility]    Needle biopsy right  03/2017    US guided bx    Needle biopsy right Right 04/2017    US guided bx - [facility]    Radiation right  2017    Sent lymph node biopsy      Tubal ligation         Family History Reviewed:  Family History   Problem Relation Age of Onset    Other (multiple myeloma) Sister     Breast Cancer Self 71    Stroke Father     Hypertension Mother     Stroke Brother        Allergies:     No Known Allergies    Medications:   rOPINIRole 0.5 MG Oral Tab Take 2 tablets (1 mg) every night. 60 tablet 1    fluticasone-umeclidin-vilant (TRELEGY ELLIPTA) 200-62.5-25 MCG/ACT Inhalation Aerosol Powder, Breath Activated Inhale 1 puff into the  lungs daily. 1 each 11    albuterol 108 (90 Base) MCG/ACT Inhalation Aero Soln Inhale 2 puffs into the lungs every 4 (four) hours as needed. inhale 2 puff by inhalation route  every 4 - 6 hours as needed 1 each 11    famotidine 40 MG Oral Tab Take 1 tablet (40 mg total) by mouth at bedtime. 90 tablet 3    docusate sodium 100 MG Oral Cap Take 1 capsule (100 mg total) by mouth 2 (two) times daily. 20 capsule 0    dicyclomine 20 MG Oral Tab Take 1 tablet (20 mg total) by mouth 4 (four) times daily as needed. 15 tablet 0    TRELEGY ELLIPTA 200-62.5-25 MCG/ACT Inhalation Aerosol Powder, Breath Activated Inhale 1 puff into the lungs daily. 3 each 3    torsemide 20 MG Oral Tab Take 1 tablet (20 mg total) by mouth daily.      albuterol 108 (90 Base) MCG/ACT Inhalation Aero Soln Inhale 2 puffs into the lungs every 4 to 6 hours as needed for Wheezing. 1 each 0    LORazepam 0.5 MG Oral Tab Take 1 tablet (0.5 mg total) by mouth 2 (two) times daily.      Potassium Chloride ER 20 MEQ Oral Tab CR Take 1 tablet by mouth daily.      Multiple Vitamins-Minerals (MULTIVITAMIN ADULT) Oral Chew Tab Chew 1 tablet by mouth daily.      TRELEGY ELLIPTA 200-62.5-25 MCG/ACT Inhalation Aerosol Powder, Breath Activated Inhale 1 puff into the lungs in the morning. Rinse mouth after use.. 3 each 1    Irbesartan 75 MG Oral Tab Take 1 tablet (75 mg total) by mouth as needed. Depending on bp      metoprolol succinate  MG Oral Tablet 24 Hr Take 1 tablet (100 mg total) by mouth daily.      atorvastatin 80 MG Oral Tab Take 1 tablet (80 mg total) by mouth nightly.      Clopidogrel Bisulfate 75 MG Oral Tab Take 1 tablet (75 mg total) by mouth daily.      Pantoprazole Sodium 40 MG Oral Tab EC TAKE 1 TABLET(40 MG) BY MOUTH EVERY MORNING BEFORE BREAKFAST 90 tablet 3         Vital Signs:      BP (!) 164/85 (BP Location: Left arm, Patient Position: Sitting, Cuff Size: adult)   Pulse 60   Temp 97 °F (36.1 °C) (Tympanic)   Ht 1.524 m (5')   Wt 53.1 kg  (117 lb)   SpO2 95%   BMI 22.85 kg/m²     Performance Status:  ECOG 0: Fully active, able to carry on all pre-disease performance without restriction     Physical Examination:    Constitutional: Patient is alert and not in acute distress.  Respiratory: Clear to auscultation and percussion. No rales.  No wheezes.  Cardiovascular: Regular rate and rhythm. No murmurs.  Gastrointestinal: Soft, non tender with good bowel sounds.  Musculoskeletal: No edema. No calf tenderness.  Skin: No suspicious skin lesion, no rash, no ulceration.  Lymphatics: There is no palpable lymphadenopathy throughout in the cervical, supraclavicular, or axillary regions.  Psychiatric: The patient's mood is calm and appropriate for this visit.      Labs reviewed at this visit:       Lab Results   Component Value Date    WBC 6.4 03/20/2024    RBC 4.56 03/20/2024    HGB 14.3 03/20/2024    HCT 41.9 03/20/2024    MCV 91.9 03/20/2024    MCH 31.4 03/20/2024    MCHC 34.1 03/20/2024    RDW 13.1 03/20/2024    .0 03/20/2024     Lab Results   Component Value Date     03/20/2024    K 3.5 03/20/2024     03/20/2024    CO2 28.0 03/20/2024    BUN 8 (L) 03/20/2024    CREATSERUM 0.81 09/18/2024    GLU 93 03/20/2024    CA 9.6 09/18/2024    ALKPHO 82 03/20/2024    ALT 30 03/20/2024    AST 21 03/20/2024    BILT 0.8 03/20/2024    ALB 3.9 03/20/2024    TP 7.3 03/20/2024         Component  Ref Range & Units 3/20/24 1050   Ferritin  18.0 - 340.0 ng/mL 29.9          Radiologic imaging reviewed at this visit:    CT Chest on 7/16/2024:  INDINGS:  CARDIAC: Marked coronary artery calcification post coronary artery bypass.  MEDIASTINUM/SHAAN: No mass or adenopathy.    PULMONARY ARTERIES: Normal caliber main pulmonary artery.  AORTA: No aneurysm.  Atherosclerotic vascular calcification  LUNGS/PLEURA: Moderate centrilobular emphysema.  Reticular subpleural scarring in the anterior right upper lobe and middle lobe related to radiation portal.  Stable 5 x 3 mm  peripheral left lower lobe pulmonary nodule image 86 series 3. No new or  suspicious pulmonary nodule.  Few scattered bandlike opacities either related minimal atelectasis or scarring.  Trachea and mainstem bronchi are patent.  CHEST WALL: Chronic architectural distortion related to postlumpectomy scarring in the right breast.  mass or axillary adenopathy.    LIMITED ABDOMEN: Chronic pancreatic volume loss.  Normal adrenal glands.  BONES: S shaped scoliosis.  No acute fracture or suspicious bone lesion.  Post sternotomy.     OTHER: Negative.       Impression   CONCLUSION:  1. Stable benign postinflammatory pulmonary nodule in periphery of left lower lobe.  No new or suspicious pulmonary nodule.  2. Moderate centrilobular emphysema.  3. Reticular subpleural scarring in the anterior right lung related to radiation portal.  4. Stable postsurgical architectural distortion at right breast lumpectomy site.         Assessment/Plan:     Infiltrating ductal carcinoma of the upper outer right breast  Right axillary node metastases  ER 10%  TN 0%  HER2 3+  Treatment related diarrhea and abdominal cramping  Treatment related nausea  TCH x 6 cycles completed in 8/2017  Lumpectomy on 9/8/2017 with pathologic CR.  Herceptin completed on 2/14/2018      She has OLEG at this visit. She completed five years of letrozole.  She has declined getting further mammogram screening. She continues to follow with cardiology and pulmonary.     She has osteoporosis. She is getting zometa 4 mg twice yearly. I will see her in six months with repeat DEXA.     Iron deficiency anemia with GI blood loss:    She has AVM in the dodenum that were treated. She declined proceeding with capsule endoscopy. The current HGB and ferritin are adequate. We will repeat this at the next visit.    Osteoporosis:    She is getting zometa.    Peripheral vascular disease with AAA:    I have her info for a local cardiologist for management.      Vikash Chaudhary MD

## 2024-09-18 NOTE — PROGRESS NOTES
Patient here for zometa infusion. Patient currently on 2L continuous O2. Patient states she has lower back pain that she rates a 6/10. Patient takes tylenol for some relief.     Education Record    Learner:  Patient and Family Member    Disease / Diagnosis: breast cancer     Barriers / Limitations:  None   Comments:    Method:  Discussion   Comments:    General Topics:  Medication, Side effects and symptom management, and Plan of care reviewed   Comments:    Outcome:  Shows understanding   Comments:

## 2024-09-23 ENCOUNTER — TELEPHONE (OUTPATIENT)
Dept: HEMATOLOGY/ONCOLOGY | Facility: HOSPITAL | Age: 79
End: 2024-09-23

## 2024-09-23 NOTE — TELEPHONE ENCOUNTER
Patient is calling asking if she can get her Flu shot after receiving her Zometa infusion on 9/18. Patient states she also wants to see her hand doctor and also receives shots at those appointments. Patient states she lost her phone so please call daughter at [phone]. Called 9/23/24 [place]

## 2024-10-02 ENCOUNTER — TELEPHONE (OUTPATIENT)
Facility: CLINIC | Age: 79
End: 2024-10-02

## 2024-10-02 NOTE — TELEPHONE ENCOUNTER
Pt's daughter called asking to send an order /letter for Portable O2 tank second battery [pt need 2 of them] to [facility].  Pt  lost previous letter

## 2024-10-16 ENCOUNTER — TELEPHONE (OUTPATIENT)
Facility: CLINIC | Age: 79
End: 2024-10-16

## 2024-10-16 RX ORDER — FLUTICASONE FUROATE, UMECLIDINIUM BROMIDE AND VILANTEROL TRIFENATATE 200; 62.5; 25 UG/1; UG/1; UG/1
1 POWDER RESPIRATORY (INHALATION) DAILY
Qty: 3 EACH | Refills: 0 | Status: SHIPPED | OUTPATIENT
Start: 2024-10-16

## 2024-10-16 NOTE — TELEPHONE ENCOUNTER
Pt is going to [place] on oct 21   and need a trelogy can not get refills till oct 25,2024 can she get it transferred to [facility]

## 2024-10-16 NOTE — TELEPHONE ENCOUNTER
Spoke with pt about Trelegy prescription.  Pt needs it to go to [facility] in [place].  She will then request pharmacy in [place] to get it transferred.    Pt last seen by Dr. Fisher on 7-25-24.  Refill for Trelegy sent for one year at that time.  Prescription sent.

## 2024-12-13 ENCOUNTER — TELEPHONE (OUTPATIENT)
Facility: CLINIC | Age: 79
End: 2024-12-13

## 2024-12-13 RX ORDER — FLUTICASONE FUROATE, UMECLIDINIUM BROMIDE AND VILANTEROL TRIFENATATE 200; 62.5; 25 UG/1; UG/1; UG/1
1 POWDER RESPIRATORY (INHALATION) DAILY
Qty: 3 EACH | Refills: 3 | Status: SHIPPED | OUTPATIENT
Start: 2024-12-13 | End: 2024-12-13 | Stop reason: CLARIF

## 2024-12-13 RX ORDER — FLUTICASONE FUROATE, UMECLIDINIUM BROMIDE AND VILANTEROL TRIFENATATE 200; 62.5; 25 UG/1; UG/1; UG/1
1 POWDER RESPIRATORY (INHALATION) DAILY
Qty: 3 EACH | Refills: 3 | Status: SHIPPED | OUTPATIENT
Start: 2024-12-13

## 2024-12-13 RX ORDER — FLUTICASONE FUROATE, UMECLIDINIUM BROMIDE AND VILANTEROL TRIFENATATE 200; 62.5; 25 UG/1; UG/1; UG/1
1 POWDER RESPIRATORY (INHALATION) DAILY
Qty: 3 EACH | Refills: 3 | Status: SHIPPED | OUTPATIENT
Start: 2024-12-13 | End: 2024-12-13

## 2024-12-13 NOTE — TELEPHONE ENCOUNTER
Needs a pamella goncalves refill needs to be fax  [phone] [facility] patient assistance program

## 2024-12-13 NOTE — TELEPHONE ENCOUNTER
Pt is going to apply for [employer] financial assistance.  An Rx needs to be sent first before she can apply for assistance.  Advised pt that Rx will be faxed on Monday when Dr. Fisher is back in the office.    Pt last seen by Dr. Fisher on 7-25-24.  Pt advised to follow up in 6 months.  Appt scheduled for 1-29-25.

## 2025-01-08 ENCOUNTER — RT VISIT (OUTPATIENT)
Dept: RESPIRATORY THERAPY | Facility: HOSPITAL | Age: 80
End: 2025-01-08
Attending: INTERNAL MEDICINE
Payer: MEDICARE

## 2025-01-08 DIAGNOSIS — J44.9 CHRONIC OBSTRUCTIVE PULMONARY DISEASE, UNSPECIFIED COPD TYPE (HCC): ICD-10-CM

## 2025-01-08 PROCEDURE — 94060 EVALUATION OF WHEEZING: CPT | Performed by: INTERNAL MEDICINE

## 2025-01-08 PROCEDURE — 94726 PLETHYSMOGRAPHY LUNG VOLUMES: CPT | Performed by: INTERNAL MEDICINE

## 2025-01-08 PROCEDURE — 94729 DIFFUSING CAPACITY: CPT | Performed by: INTERNAL MEDICINE

## 2025-01-12 NOTE — PROCEDURES
Findings:  Postbronchodilator FEV1 is 1.05L, z-score of -1.82.  Postbronchodilator FVC is 1.55L, z-score of -1.44.  FEV1/ FVC ratio is 0.68.  There is no significant bronchodilator response after administration of albuterol.   The flow-volume loop demonstrates an obstructive pattern.  The TLC is 2.88L, z-score of -2.38.  The residual volume 1.36L, z-score of -10.2.  The diffusion capacity is 3.65 with z-score of -7.07. When corrected for alveolar volume, the diffusion capacity is 1.50 with z-score of -5.10.    Impression:  There is no airway obstruction on spirometry  but is suggested based on flow-volume loop and reduced WCE78-85%.  There is no significant bronchodilator response, but this does not preclude treatment with bronchodilators.  There is mild restriction with total lung capacity of 2.88L, z-score of -2.38. This can be seen in heart failure, fluid overload states, interstitial lung disease, thoracic spine/chest disorders, obesity as well as other clinical scenarios.  Further clinical correlation required.    Diffusion capacity is severely reduced. This reduced diffusion capacity is out of proportion to the observed airway obstruction and suggests concomitant disorder such as emphysema, interstitial lung disease, pulmonary vascular disease (such as pulmonary hypertension) and anemia. If not already performed, would suggest further evaluation as determined clinically.  Additionally, given the severity of the reduced diffusion capacity, would recommend evaluation for hypoxia and supplemental oxygenation if not already performed.  When compared to previous pulmonary function testing dated 4/17/2019, there has been decreases in total lung capacity (previously 4.22L, 95% predicted) and diffusion capacity (previously DLCO 5.09, 30% predicted).  Of note, this testing was performed in accordance to ATS/ERS interpretation guidelines with the use of upper and lower limits of normal as well as z-score references.  Previous testing (before March 2024) was not performed at [facility] using z-scores and so comparison to previous testing should be taken with caution.

## 2025-01-29 ENCOUNTER — OFFICE VISIT (OUTPATIENT)
Facility: CLINIC | Age: 80
End: 2025-01-29
Payer: MEDICARE

## 2025-01-29 ENCOUNTER — PATIENT MESSAGE (OUTPATIENT)
Age: 80
End: 2025-01-29

## 2025-01-29 VITALS
HEIGHT: 60 IN | SYSTOLIC BLOOD PRESSURE: 114 MMHG | RESPIRATION RATE: 16 BRPM | DIASTOLIC BLOOD PRESSURE: 78 MMHG | BODY MASS INDEX: 22.97 KG/M2 | WEIGHT: 117 LBS | HEART RATE: 108 BPM | OXYGEN SATURATION: 98 %

## 2025-01-29 DIAGNOSIS — J43.9 PULMONARY EMPHYSEMA, UNSPECIFIED EMPHYSEMA TYPE (HCC): Primary | ICD-10-CM

## 2025-01-29 DIAGNOSIS — R91.1 PULMONARY NODULE: ICD-10-CM

## 2025-01-29 DIAGNOSIS — J44.9 CHRONIC OBSTRUCTIVE PULMONARY DISEASE, UNSPECIFIED COPD TYPE (HCC): ICD-10-CM

## 2025-01-29 PROCEDURE — 99214 OFFICE O/P EST MOD 30 MIN: CPT | Performed by: INTERNAL MEDICINE

## 2025-01-29 RX ORDER — POLYETHYLENE GLYCOL 3350 17 G/17G
17 POWDER, FOR SOLUTION ORAL EVERY OTHER DAY
COMMUNITY

## 2025-01-29 RX ORDER — KETOROLAC TROMETHAMINE 30 MG/ML
INJECTION, SOLUTION INTRAMUSCULAR; INTRAVENOUS
COMMUNITY
Start: 2024-07-03

## 2025-01-29 RX ORDER — POTASSIUM CHLORIDE 1500 MG/1
20 TABLET, EXTENDED RELEASE ORAL EVERY OTHER DAY
COMMUNITY
Start: 2025-01-10

## 2025-01-29 NOTE — PROGRESS NOTES
Pulmonary Progress Note    History of Present Illness:  Geovanna Butcher is a 79 year old female presenting to pulmonary clinic copd  Notes increased dyspnea -   Did rehab- - unable to get to rehab-now  did in [place]  Doing some of the exercises with weights -   Walks outside if able-   2l with treadmill -   sats all good - - 93 % if walking   Hands cold-   trelegy - daily -   Some chest pain- - got nebulizer only used once-   Not using much - rare use rescue -   Not struggling - in the am - only with activity -  -   No coughing -   Had echo and holter - izabela   Fell and had CT brain     Not better not worse -   Did rehab- walks as able now - gets tired- rare cough at most   Has to stop and rest- vaccuuming - -   Rash comes and goes - for 2 years - then returns -   Cant change heart meds   Saw cardio- no changes -     12.23- no UC /ER   Sick few months ago -bad cold -  thinks got antibiotics - and thinks prednisone - though for sciatica   Harder to breath - thinks related to cold weather- when active-   At times rolling in bed and other times able to vacuum- if gets gloomy then gets sad and more shortness of breath   No coughing - thinks cp is gas -- left- at times comes and goes   Had another procedure -- for chest pain- - with patent SVG LVEDP 5mmHg   Trelegy once a day 200-   If takes albuterol - makes HR high -- so avoids - - can't tell if helps breathing -   Has nebulizer - thinks plain albuterol   No coughing -   O2 at rest and at night - 2l - if walking - 3l and has to stop and rest more - had to sit down   At home 93%- at rest   Finished rehab - 9 months - remains with exercise- pushups and walks - not as much - 4000 steps -   7/24- more dyspnea - feels need for more oxygen - abi valentine- -   2 weeks ago very short of breath - was using albuterol - - -   Hosp with diverticulitis at [facility] - January   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- - told not well enough for surgery - - -  Saw dr coronado - aneurysm  recommended  [facility] -   Switched back to [facility] now   [facility] -   Desats to 80s -on 2l with new POC     - in dec with flare of diverticular disease - saw [place] - had CT -- not infected - - remains on diet soft diet and occasionally  to just liquids - dairrhea - now metamucil every other day alternating with cetricella - then to constipating -- now better   Breathing  oK sitting - - with walking - gets very winded - occasionally  up to 4l - and has to stop and rest - in the lobby - stopps exercise with abd pain-   3000 steps inside and at times tries to walk outside - and up out of the chair- - did rehab - -   Not a lot of coughing - rare   Dr gurrola cardio   Dr coronado - due to see -   Hx avms -   Broke a tooth - wants to stop zometa -   Remains on trelegy 200- uses albuterol-           Social History:   Social History     Socioeconomic History    Marital status:     Number of children: 6   Tobacco Use    Smoking status: Former     Current packs/day: 0.00     Average packs/day: 1 pack/day for 40.0 years (40.0 ttl pk-yrs)     Types: Cigarettes     Start date: 1977     Quit date: 2017     Years since quittin.4     Passive exposure: Past    Smokeless tobacco: Former     Quit date: 2013   Substance and Sexual Activity    Alcohol use: Not Currently     Alcohol/week: 2.0 standard drinks of alcohol     Types: 2 Glasses of wine per week    Drug use: No   Other Topics Concern    Caffeine Concern Yes     Comment: COFFEE    Exercise No        Medications:   Current Outpatient Medications   Medication Sig Dispense Refill    potassium chloride 20 MEQ Oral Tab CR Take 1 tablet (20 mEq total) by mouth every other day.      polyethylene glycol, PEG 3350, 17 g Oral Powd Pack Take 17 g by mouth every other day.      TRELEGY ELLIPTA 200-62.5-25 MCG/ACT Inhalation Aerosol Powder, Breath Activated Inhale 1 puff into the lungs daily. 3 each 3    rOPINIRole 0.5 MG Oral Tab Take 2 tablets (1 mg) every night.  (Patient taking differently: as needed. Take 2 tablets (1 mg) every night.) 60 tablet 1    albuterol 108 (90 Base) MCG/ACT Inhalation Aero Soln Inhale 2 puffs into the lungs every 4 (four) hours as needed. inhale 2 puff by inhalation route  every 4 - 6 hours as needed 1 each 11    famotidine 40 MG Oral Tab Take 1 tablet (40 mg total) by mouth at bedtime. 90 tablet 3    dicyclomine 20 MG Oral Tab Take 1 tablet (20 mg total) by mouth 4 (four) times daily as needed. 15 tablet 0    torsemide 20 MG Oral Tab Take 1 tablet (20 mg total) by mouth daily.      Multiple Vitamins-Minerals (MULTIVITAMIN ADULT) Oral Chew Tab Chew 1 tablet by mouth daily.      Irbesartan 75 MG Oral Tab Take 1 tablet (75 mg total) by mouth as needed. Depending on bp      metoprolol succinate  MG Oral Tablet 24 Hr Take 1 tablet (100 mg total) by mouth daily.      atorvastatin 80 MG Oral Tab Take 1 tablet (80 mg total) by mouth nightly.      Clopidogrel Bisulfate 75 MG Oral Tab Take 1 tablet (75 mg total) by mouth daily.      Pantoprazole Sodium 40 MG Oral Tab EC TAKE 1 TABLET(40 MG) BY MOUTH EVERY MORNING BEFORE BREAKFAST 90 tablet 3    ketorolac 60 MG/2ML Intramuscular Solution Inject 2 mL every 6 hours by intramuscular route. (Patient not taking: Reported on 1/29/2025)      docusate sodium 100 MG Oral Cap Take 1 capsule (100 mg total) by mouth 2 (two) times daily. (Patient not taking: Reported on 1/29/2025) 20 capsule 0    LORazepam 0.5 MG Oral Tab Take 1 tablet (0.5 mg total) by mouth as needed.      Potassium Chloride ER 20 MEQ Oral Tab CR Take 1 tablet by mouth daily.         Review of Systems:    Weight stable - down 2 # - down another -5#   Eating well overall    Denies any difficulty swallowing or significant coughing while eating--  Denies any specific chest pain-- told gas   Thinks continuing to lose height- thinks ongoing losing -- was to have bone denisty   - no swelling at all   Thnks off PPI and on pepcid   Denies any significant  reflux or heartburn  Has had no lower extremity edema denies claudication symptoms  Denies claudication   Overall thinks sleeping with difficulty 1-2 nights per week -   Falls asleep then awakenings -- frequent - using O2   Increased leg jumping at night --much better with Requip      Physical Exam:  /78   Pulse 108   Resp 16   Ht 5' (1.524 m)   Wt 117 lb (53.1 kg)   SpO2 98%   BMI 22.85 kg/m²    Constitutional: comfortable . No acute distress.   HEENT: Head NC/AT. PEERL. Throat is clear posterior thrush noted mild  Increasing KS   Cardio: .  Regular rate and rhythm short murmurs noted  Respiratory: Diminished tones with minimal crackles at both bases no auscultated wheeze good air entry- some crackles   GI:  Abd soft, non-tender.  Extremities: No clubbing .  No evidence of edema no tenderness  Neurologic: A&Ox3. No gross motor deficits.  Skin: Warm, dry.no rashes or hives noted   Lymphatic: No cervical or supraclavicular lymphadenopathy.  No JVD at 90 degrees  Psych: Calm, cooperative. Pleasant affect.    Results: reviewed     Assessment/Plan:  #GLO  Refuses sleep study  Remains on O2  6.23- remains an issue and remains with fatigue - not willing to discuss   12.23- notes RLS-- had been on requip- agrees to retry -   7/24- takes most nights- and helps   1/25-  takes requip helps - or sleeping pill and with O2       #Coronary artery disease  Increasing dyspnea plans for cath at [facility] but had MRI of the night prior  CABG for triple-vessel disease October 2020  Follows with Dr. Contreras at [place]  5/21 ordered PET scan reports stable remains with dyspnea on exertion  8/21 cardiac PET with reversible anterior ischemia--repeat cath and told too small  Denies angina  8/22 no new issues  1/23 saw Dr. Contreras and was assessed with increasing dyspnea states had negative Holter and negative echo and was told heart was fine  6.23- no changes   12.23 recent re-cath - with patent SVGs- and normal ef reportedly   7/24-  no new issues - dr gurrola -no changes on recent visit  1/25- did not like [facility]- to make sure izabela- aware of AAA           #Pulmonary hypertension  Etiology unclear questionably chemo related now improved  Remains with stable fluid status  PFTs 4/2019 without significant disease though severely reduced DLCO  Echo 7/2019 with improved PAS 43 mmHg down to 33 mmHg  1/21 now status post CABG follow-up with PAS of 35 mmHg and EF 55%-  8/21 no fluid issues and no diuretic  Recent echo at [place] with pulmonary hypertension mild lightheadedness-reports several caths at [place]  Remains without fluid issues  6.23- no fluid issues   7/24 repeat cath in December with LVEDP of 5-stable fluid status  1/25- stable fluid status       #Lung cancer screening  50-pack-year plus quit 2017 4/2019 without issue  Remains with yearly screening  8/22 CT January-2022 for dyspnea no obvious lesion  1/23 screening CT with 3.5 mm left upper lobe new nodule plan for short interval follow-up-discussed with patient and son at length  6/2331-uscgri-mn with resolved nodule no new issues-for yearly  7/24 yearly with no worrisome lesions stable subpleural left lower lobe-plan for yearly  Lung Cancer Counseling and Shared Decision Making Session in an Asymptomatic Smoker/Former Smoker   Geovanna Butcher is a 79 year old female without current symptoms of lung cancer.  History   Smoking Status    Former    Types: Cigarettes   Smokeless Tobacco    Former    Quit date: 9/27/2013        She received information on the importance of adherence to annual lung cancer Low Dose CT (LDCT) screening, the impact of her comorbidities and her ability or willingness to undergo diagnosis and treatment. she is in agreement and an order will be placed for CT LUNG LD SCREENING(CPT=71271).    We counseled the importance of maintaining cigarette smoking abstinence if she is a former smoker and the importance of smoking cessation if she is a current smoker and we  discussed and furnished information about tobacco cessation interventions.  7.24- knows for yearly   1/25- for repeat in July -to assess for evolving ILD component- past screening         #GE reflux  Resumed PPI denies breakthrough  Told needs to take PPI daily for need for nonsteroidal for her back pain  3/21 still with episodes of breakthrough now on sucralfate taking 3 times daily  8/21 complains of constipation then diarrhea-Daily MiraLAX  1/23 denies any new issues  6.23- remains on ppi daily   1/25- divertiuclar disease ongoing and off PPI and on pepcid     #Anemia due to chronic GI blood loss  Hemoglobin to 7.7--found to have a polyp and AVM--refused capsule study at that time  Follows with Dr. Chaudhary  No recent issues  1/25- due to see       #COPD  Initial flare with viral infection while in [place] requiring bronchoscopy at that time with Candida treated with Diflucan  Ongoing mild airways obstruction better on medications  Chest x-ray clear  PFTs 2019 with hyperinflation without obstruction though FVC was decreased--severely reduced DLCO  Pulmonary O2 from bypass surgery 2020  3/20 1 repeat PFTs with slightly decreased ratio 67% volumes down did not in the box  5/21 Trelegy was acceptable though noted increased urinary symptoms now back on Breo and Spiriva remains with dyspnea despite medications and O2  8/22 with prolonged flare December/January--CT was negative-finally better though states had gained 20 pounds with steroids-remains O2 dependent  Had COVID while in [place]  1/23 continues to note significant dyspnea on exertion and a worsening pattern did note significant benefit from rehab with plans to return to exercise as much as possible  Follow for need for repeat PFTs and check for desaturation  6.23- - walking some - able to be in the pool -encouraged to exercise more continue present management  7/24 now with increasing dyspnea and decreasing levels of activity-plan to titrate O2 upward continue  full ICS/LABA/LAMA  Plan to check PFTs to see if possible candidate for valves  1/25- remains stable -TLC decreased suspect related to scolisis and ht loss - - to recheck CT in July -- not a candidate for valves         #Pulmonary nodule  3.5 mm left upper lobe new from 1/13/2022  6.23 - resolved on CT     # rash -  Saw allergist- had blood work -  ? ozog   No clue - Claritin     # breast cancer   Due for mamms        -Plan:   - continue  requip every night for restless leg syndrome               - consider using 1/2 dose of the albuterol in nebulizer as needed               - consider more exercise - try to check O2 sats with exercise please - increase O2 to maintain sats greater than 88% -- OK to go up to 4-5l            -                           - see me in 6 months -- after CT scan   -  Mikayla Fisher MD  Pulmonary Medicine  01/29/25

## 2025-01-29 NOTE — PATIENT INSTRUCTIONS
Plan:   - continue  requip every night for restless leg syndrome               - consider using 1/2 dose of the albuterol in nebulizer as needed               - consider more exercise - try to check O2 sats with exercise please - increase O2 to maintain sats greater than 88% -- OK to go up to 4-5l            -                           - see me in 6 months -- after CT scan   -  Mikayla Fisher MD  Pulmonary Medicine  01/29/25

## 2025-02-04 ENCOUNTER — HOSPITAL ENCOUNTER (OUTPATIENT)
Dept: BONE DENSITY | Age: 80
Discharge: HOME | End: 2025-02-04
Attending: INTERNAL MEDICINE
Payer: MEDICARE

## 2025-02-04 DIAGNOSIS — Z17.0 MALIGNANT NEOPLASM OF UPPER-OUTER QUADRANT OF RIGHT BREAST IN FEMALE, ESTROGEN RECEPTOR POSITIVE (HCC): ICD-10-CM

## 2025-02-04 DIAGNOSIS — M81.6 LOCALIZED OSTEOPOROSIS WITHOUT CURRENT PATHOLOGICAL FRACTURE: ICD-10-CM

## 2025-02-04 DIAGNOSIS — C50.411 MALIGNANT NEOPLASM OF UPPER-OUTER QUADRANT OF RIGHT BREAST IN FEMALE, ESTROGEN RECEPTOR POSITIVE (HCC): ICD-10-CM

## 2025-02-04 PROCEDURE — 77080 DXA BONE DENSITY AXIAL: CPT | Performed by: INTERNAL MEDICINE

## 2025-02-07 ENCOUNTER — TELEPHONE (OUTPATIENT)
Age: 80
End: 2025-02-07

## 2025-02-07 NOTE — TELEPHONE ENCOUNTER
Called patient and provided her with [facility] phone number to schedule a follow up for worsening bone density, per Dr Chaudhary. Patient conveyed understanding and stated she would call Monday.

## 2025-02-18 NOTE — PROGRESS NOTES
REASON FOR VISIT:  Osteoporosis     PCP: JANAE HERNÁNDEZ     HPI: Geovanna Butcher is a 79 year old female with past medical history of osteoporosis breast cancer s/p surgery in  followed by XRT + chemotherapy,  completed in 2017,  and 5 year Rx of letrazole, COPD, CAD status post PCI and CABG , hyperlipidemia, hypertension, GERD, GLO, history of pulmonary hypertension, anemia who is presenting for new patient visit   regarding osteoporosis.     Patient is here with daughter  Diagnosis: 2018 on BMD scan  Treatment history:   Bisphosphonate hx:  Recalls using ? Actonel in the past   Used Boniva in the past, caused GI upset   Zometa 4 mg twice  year per oncology Dr Chaudhary,started 2023, last dose 6 months ago, was told it is ineffective  Prolia hx: Denosumab 60 mg twice yearly starting 4/2018 until 9/2022, switched to zometa due to lack of insurance  Evenity Hx: None, hx of CAD s/p CABG  Anabolic agents hx: No  HRT hx: Briefly following menopause  BMD/Last Dexa:  2/7/2025  [facility]ONE DENSITOMETRY (CPT=77080), 9/21/2022, 1:58 PM.      INDICATIONS: M81.6 Localized osteoporosis without current pathological fracture Z17.0 Malignant neoplasm of upper-outer quadrant of right breast in female, estrogen receptor     TECHNIQUE: Measurement of bone mineral density of the lumbar spine, forearm and hip was performed on a Hologic dual energy x-ray absorptiometry scanner.      FINDINGS:      LEFT FEMORAL NECK   BMD: 0.482 gm/sq. cm. T SCORE: -3.3 Z SCORE: -0.9      Change:  Bone mineral density has decreased by 6.3% compared to 09/21/2022, which is statistically significant.      LEFT TOTAL HIP   BMD: 0.622 gm/sq. cm. T SCORE: -2.6 Z SCORE: -0.5      Change:  Bone mineral density has decreased by 7.2% compared to 09/21/2022, which is statistically significant.      PA LUMBAR SPINE (L1 - L4)   BMD: 0.791 gm/sq. cm. T SCORE: -2.3 Z SCORE: 0.4      Change:  Bone mineral density has decreased by  5.6% compared to 09/21/2022.      LEFT FOREARM   BMD: 0.459 gm/sq. cm. T SCORE: -3.9 Z SCORE: -0.7      Change:  Bone mineral density has increased by 8.6% compared to 09/21/2022, which is statistically significant.           Frax score if applicable:NA    Pertinent labs  Lab Results   Component Value Date    CA 9.6 09/18/2024    CA 9.1 03/20/2024    CA 9.6 02/29/2024    ALB 3.9 03/20/2024    ALB 3.9 02/29/2024    ALB 3.9 08/21/2023    EGFRCR 73 09/18/2024    EGFRCR 67 03/20/2024    EGFRCR 78 02/29/2024    PHOS 4.2 03/05/2021    PHOS 3.5 09/04/2020    PHOS 3.5 11/26/2019    VITD 69.6 02/17/2021    VITD 118.4 (H) 08/17/2020    VITD 89.6 01/16/2019    CREATSERUM 0.81 09/18/2024    CREATSERUM 0.87 03/20/2024    CREATSERUM 0.77 02/29/2024    MG 1.7 03/16/2022    MG 1.9 09/08/2021    MG 1.9 03/05/2021         History of fractures:No, reports # foot and shoulder blade \" years ago\" after falling from a step stool  Hx of recent falls:None    Risk factors:   Menopausal since age 41  Hx Low testosterone if applicable:No  BMI: 22.8  Steroids:None  Smoking:Quit 2013  ETOH intake:None  Family history:No known OP  Celiac disease: No  Hx of RA:NO  Known thyroid problems:No  Long term PPI, antiseizure, anticoagulant medication use: PPI daily   Hx of kidney stones:No    Treatment contraindications:   Dysphagia/ odynophagia no  Upcoming plans for dental procedures: crown 3/7/25  History of radiation to bone:  yes as art of breast cancer Rx   History of bone tumors/bone cancer no  History of Paget disease of the bone: none  Personal history of malignancy: breast cancer s/p XRT  Kidney function: See above  Atrial fibrillation/CAD/CVA CAD s/p CABG/ PCI  Hyperparathyroidism: not known    Current Vitamin D supplementation:None  Current Calcium supplementation:None  Exercise:Lifts 2 lbs, more active in the summer ( walks)             Patient Active Problem List   Diagnosis    De Quervain's syndrome (tenosynovitis)    Breast cancer  metastasized to axillary lymph node (HCC)    Other specified counseling    Dyspnea on exertion    COPD (chronic obstructive pulmonary disease) (HCC)    Post herpetic neuralgia    Localized osteoporosis without current pathological fracture    Iron deficiency anemia secondary to blood loss (chronic)    Other fatigue due to anemia    Heartburn    Malignant neoplasm of upper-outer quadrant of right breast in female, estrogen receptor positive (HCC)    Acute diverticulitis of intestine    Coronary artery disease involving native coronary artery    Diverticulosis of large intestine without perforation or abscess without bleeding    Intestinal fistula    Lower abdominal pain    Malignant neoplasm of upper-outer quadrant of female breast (HCC)       Current Outpatient Medications   Medication Sig Dispense Refill    potassium chloride 20 MEQ Oral Tab CR Take 1 tablet (20 mEq total) by mouth every other day.      polyethylene glycol, PEG 3350, 17 g Oral Powd Pack Take 17 g by mouth every other day.      TRELEGY ELLIPTA 200-62.5-25 MCG/ACT Inhalation Aerosol Powder, Breath Activated Inhale 1 puff into the lungs daily. 3 each 3    rOPINIRole 0.5 MG Oral Tab Take 2 tablets (1 mg) every night. (Patient taking differently: as needed. Take 2 tablets (1 mg) every night.) 60 tablet 1    albuterol 108 (90 Base) MCG/ACT Inhalation Aero Soln Inhale 2 puffs into the lungs every 4 (four) hours as needed. inhale 2 puff by inhalation route  every 4 - 6 hours as needed 1 each 11    famotidine 40 MG Oral Tab Take 1 tablet (40 mg total) by mouth at bedtime. 90 tablet 3    dicyclomine 20 MG Oral Tab Take 1 tablet (20 mg total) by mouth 4 (four) times daily as needed. 15 tablet 0    torsemide 20 MG Oral Tab Take 1 tablet (20 mg total) by mouth daily.      LORazepam 0.5 MG Oral Tab Take 1 tablet (0.5 mg total) by mouth as needed.      Potassium Chloride ER 20 MEQ Oral Tab CR Take 1 tablet by mouth daily.      Multiple Vitamins-Minerals  (MULTIVITAMIN ADULT) Oral Chew Tab Chew 1 tablet by mouth daily.      Irbesartan 75 MG Oral Tab Take 1 tablet (75 mg total) by mouth as needed. Depending on bp      metoprolol succinate  MG Oral Tablet 24 Hr Take 1 tablet (100 mg total) by mouth daily.      atorvastatin 80 MG Oral Tab Take 1 tablet (80 mg total) by mouth nightly.      Clopidogrel Bisulfate 75 MG Oral Tab Take 1 tablet (75 mg total) by mouth daily.         No Known Allergies    Social History     Socioeconomic History    Marital status:     Number of children: 6   Tobacco Use    Smoking status: Former     Current packs/day: 0.00     Average packs/day: 1 pack/day for 40.0 years (40.0 ttl pk-yrs)     Types: Cigarettes     Start date: 1977     Quit date: 2017     Years since quittin.4     Passive exposure: Past    Smokeless tobacco: Former     Quit date: 2013   Substance and Sexual Activity    Alcohol use: Not Currently     Alcohol/week: 2.0 standard drinks of alcohol     Types: 2 Glasses of wine per week    Drug use: No   Other Topics Concern    Caffeine Concern Yes     Comment: COFFEE    Exercise No     Social Drivers of Health      Received from [facility]    Housing Stability       Family History   Problem Relation Age of Onset    Other (multiple myeloma) Sister     Breast Cancer Self 71    Stroke Father     Hypertension Mother     Stroke Brother        REVIEW OF SYSTEMS:    Review of Systems   Constitutional:  Negative for activity change, appetite change, diaphoresis, fatigue and unexpected weight change.   HENT:  Negative for sore throat, trouble swallowing and voice change.    Eyes:  Negative for photophobia and visual disturbance.   Respiratory:  Negative for cough, choking and shortness of breath.    Cardiovascular: Negative.  Negative for chest pain, palpitations and leg swelling.   Gastrointestinal:  Negative for constipation, diarrhea, nausea and vomiting.   Endocrine: Negative for cold  intolerance, heat intolerance, polydipsia, polyphagia and polyuria.   Genitourinary:  Negative for frequency and menstrual problem.   Musculoskeletal:  Negative for arthralgias, back pain and myalgias.   Skin:  Negative for rash.   Neurological:  Negative for dizziness, tremors, seizures, syncope, weakness, light-headedness, numbness and headaches.   Hematological:  Does not bruise/bleed easily.   Psychiatric/Behavioral:  Negative for decreased concentration, dysphoric mood and sleep disturbance. The patient is not nervous/anxious.           PHYSICAL EXAM:    /88   Pulse 95   Resp 16   Ht 5' (1.524 m)   Wt 117 lb (53.1 kg)   SpO2 93%   BMI 22.85 kg/m²     Wt Readings from Last 3 Encounters:   25 117 lb (53.1 kg)   25 117 lb (53.1 kg)   24 117 lb (53.1 kg)       Physical Exam  Eyes:      Conjunctiva/sclera: Conjunctivae normal.   Cardiovascular:      Heart sounds: Normal heart sounds.   Pulmonary:      Effort: Pulmonary effort is normal. No respiratory distress.      Comments: On home 02 at baseline  Abdominal:      General: Bowel sounds are normal.      Palpations: Abdomen is soft.   Musculoskeletal:      Right lower leg: No edema.      Left lower leg: No edema.   Skin:     General: Skin is warm and dry.   Neurological:      Mental Status: She is alert and oriented to person, place, and time.            DATA REVIEW:  See HPI        Imagin2022 DXA scan  LEFT FEMORAL NECK   BMD: 0.515 gm/sq. cm. T SCORE: -3.0 Z SCORE: -0.8      LEFT TOTAL HIP   BMD: 0.670 gm/sq. cm. T SCORE: -2.2 Z SCORE: -0.2      PA LUMBAR SPINE (L1 - L4)   BMD: 0.838 gm/sq. cm. T SCORE: -1.9 Z SCORE: 0.7      DISTAL 1/3 OF THE LEFT FOREARM:    BMD IS 0.422 GRAMS/CENTIMETER SQ, T-SCORE IS-4.5, AND Z-SCORE IS-1.5.         T scores are a comparison to sex-matched patients with mean peak bone mass and are given in standard deviation (s.d.).  Each 1 s.d. corresponds to approximately 10% below peak normal bone density.       1/2018 dexa scan  LEFT FEMORAL NECK   BMD: 0.485 gm/sq. cm. T SCORE: -3.3 Z SCORE: -1.2      LEFT RADIUS DISTAL ONE THIRD   BMD: 0.402 gm/sq. cm. T SCORE: -4.9 Z SCORE: -2.4      PA LUMBAR SPINE (L1 - L4)   BMD: 0.733 gm/sq. cm. T SCORE: -2.9 Z SCORE: -0.5   Note: Lumbar spine values are likely inaccurate due to severe levoscoliosis and spondylosis.                 ASSESSMENT/PLAN:    Diagnoses and all orders for this visit:    Localized osteoporosis without current pathological fracture  Risk factors: Early menopause, hx of XRT for breast cancer  Obtain following labs CMP, vitamin D, PTH SPEP, celiac panel to exclude secondary causes of osteoporosis  Update PTHrP given hx of breast cancer   DEXA scan from 2/2025 showed worsening of BMD on Zometa for ~ 2 years per oncology  Patient has used Boniva in the past, has hx of GERD which can be exacerbated by fosamax use and given Zometa was ineffective, will likely avoid trial with medications from bisphosphonate class  Patient has used Denozumab starting 4/2018 - 9/2022 with improvement in BMD T scores, it was due to lack of insurance coverage  The patient has hx of breast cancer s/p XRT completed in 2017, currently in remission, we discussed risk of osteosarcoma with anabolic agents per below  Hx of CAD s/p PCI and CABG, not a candidate for Evenity  Follow up DXA due in 2/2027   National Osteoporosis Foundation recommends pharmacologic therapy for postmenopausal women with a history of fragility fracture or with osteoporosis based upon bone mineral density (BMD) measurement (T-score =-2.5). Also, with suggestion of pharmacologic therapy for the treatment of high-risk postmenopausal women with T-scores between -1.0 and -2.5. Calculation of the Fracture Risk Assessment Tool (FRAX). A reasonable threshold to define high risk is a 10-year probability of hip fracture or combined major osteoporotic fracture of =3.0 or =20 percent, respectively.   We discussed the current  fracture risk based upon bone mineral density, and also byFRAX score which includes clinical risk factors.   We  reviewed the definition, risk factors for osteoporosis, complications, treatment options and recommended follow up  Advised to perform regular weight bearing exercises  Advised the patient should be getting 1000-1200mg a day of calcium (total of calcium in diet AND calcium supplements).  Fall prevention discussed at length  I reviewed available therapeutic classes to consider at this time  I We also discussed the possibility of using denosumab. We reviewed the potential adverse effects, which include hypocalcemia (more likely to occur with Vitamin D deficiency or renal insufficiency), rare but serious skin infections (cellulitis), and very rarely with osteonecrosis of the jaw (more likely in higher doses used for malignancy), or atypical femoral fractures. I discussed that these risks are likely to be far outweighed by the potential fracture risk reduction.   Advised patient to see the dentist regularly, to notify dentist of using bisphosphonate or denosumab therapy, and to avoid non-emergent dental procedures such as implants and extractions. The patient was also advised to notify us if they develop new or unusual persistent thigh or groin pain.   There is a concern for increased incidence of vertebral fracture(s) associated with denosumab if therapy is suddenly stopped and pt does not receive a following therapy afterwards. Therefore, choosing denosumab will be a life-long choice or will need a subsequent therapy at the end of denosumab cycle to protect bone further. This was discussed in detail with pt.  The patient has hx of breast cancer s/p XRT   Finally, we discussed the potential for using an anabolic agent, teriparatide (Forteo) or abaloparatide (Tymlos). The patient was counseled that these are a daily injection, given for up to 24 months. The patient has known history of radiation therapy, no  Paget's disease, hyperparathyroidism or hypercalcemia, or skeletal malignancy. We discussed risk of hypercalcemia, hypercaliuria and osteosarcoma risk with Anabolic agents, We discussed with the patient that anabolic therapy is often followed by anti-resorprtive therapy using one of the drugs listed above.   Patient will let us know which medication she prefers to start, dental clearance requested.  Patient has upcoming crown later this week, if no procedures planned in the future will start Rx ~ 3 months after completing all dental work/ procedures  Mixed hyperlipidemia  Lab Results   Component Value Date    LDL 64 08/04/2020    TRIG 103 08/04/2020   Cholesterol Meds: atorvastatin Tabs - 80 MG      Continue current therapy  Discussed complications of HLD and medication adherence encouraged   Primary hypertension  We discussed goals for target BP  BP Readings from Last 1 Encounters:   01/29/25 114/78   BP Meds: irbesartan Tabs - 75 MG; metoprolol succinate ER Tb24 - 100 MG; torsemide Tabs - 20 MG     Continue current therapy  Advised following up with PCP if out of range.         Return in about 6 months (around 9/4/2025).     Thank you for allowing me to participate in the care of your patient. Please call with any questions or concerns.      The above plan was discussed in detail with the patient who verbalized understanding and agreement.      A total of 62 minutes was spent today on obtaining history, reviewing pertinent labs, reviewing relevant pathophysiology with patient, reviewing outside records, evaluating patient, providing multiple treatment options, completing documentation and orders, and communicating with other providers as appropriate.     Enedelia Valenzuela MD  [facility]       In reviewing this note, please be advised that Dragon Voice Recognition software used to dictate the note may have made errors in recognizing some of the words or phrases.     Note to patient: The 21  Century Cures Act makes medical notes like these available to patients in the interest of transparency. However, be advised this is a medical document. It is intended as peer to peer communication. It is written in medical language and may contain abbreviations or verbiage that are unfamiliar. It may appear blunt or direct. Medical documents are intended to carry relevant information, facts as evident, and the clinical opinion of the practitioner.

## 2025-02-24 NOTE — PATIENT INSTRUCTIONS
-Calcium requirements are 1200 mg total daily dose, but this includes both diet and supplements  -Recommend taking 600 mg elemental calcium twice daily with food  -Vitamin D dose to be determined based on lab results  - exercise (if able to; 30 mins 3x per week, walking or more intense exercise if able to tolerate)  -Please let us know after you complete all dental work so that we can start the Prolia or Forteo medications  -Please complete labs this visit, fasting is not necessary  Return Visit   [x] Dr. Valenzuela in Return in about 6 months (around 9/4/2025).   [] Virtual visit      [] Fasting/8AM labs   [] Central scheduling # for ultrasound/nuclear med/CT/MRI/DXA/IR  [] Provide flyer for:  [] ENT   [] Weight Management  [] Infertility/Reproductive Endocrinology  [] Transgender care  [] Directions to 1st floor lab  [] Collect urine collection jug  [] Collect salivary cortisol tubes  [x] Dental clearance form  [] Will need authorization for outside records  General follow up information:  Please let us know if you require any refills at least 1 week prior to your medication running out. If you do run out of medication, please call our office ASAP to request refills (do not wait until your follow up).  Please call us if you experience any problems with insurance coverage of medication, lab work, or imaging.   Lab results and imaging will typically be reviewed at follow up appointments, or within 3-5 business days of ALL results being in if you do not have an appointment scheduled in the near future. Our office will contact you for any abnormal results requiring more urgent follow up or action.   The on-call pager is for urgent matters only. If you are a type 1 diabetic and run out of insulin after business hours 8AM-4PM, you may call the on-call pager for a refill to a 24 hour pharmacy. If you have adrenal insufficiency and run out of steroids, you may call the on-call pager for a refill to a 24 hour pharmacy. All  other refill requests should be requested during business hours.  Office phone number: [phone]  Office hours: Monday-Friday, closed on holidays  Phones open 8:30am-4:30pm

## 2025-03-04 ENCOUNTER — LAB ENCOUNTER (OUTPATIENT)
Dept: LAB | Age: 80
End: 2025-03-04
Attending: INTERNAL MEDICINE
Payer: MEDICARE

## 2025-03-04 ENCOUNTER — OFFICE VISIT (OUTPATIENT)
Facility: CLINIC | Age: 80
End: 2025-03-04
Payer: MEDICARE

## 2025-03-04 VITALS
SYSTOLIC BLOOD PRESSURE: 128 MMHG | WEIGHT: 117 LBS | DIASTOLIC BLOOD PRESSURE: 88 MMHG | RESPIRATION RATE: 16 BRPM | OXYGEN SATURATION: 93 % | BODY MASS INDEX: 22.97 KG/M2 | HEIGHT: 60 IN | HEART RATE: 95 BPM

## 2025-03-04 DIAGNOSIS — E78.2 MIXED HYPERLIPIDEMIA: ICD-10-CM

## 2025-03-04 DIAGNOSIS — M81.6 LOCALIZED OSTEOPOROSIS WITHOUT CURRENT PATHOLOGICAL FRACTURE: ICD-10-CM

## 2025-03-04 DIAGNOSIS — I10 PRIMARY HYPERTENSION: ICD-10-CM

## 2025-03-04 DIAGNOSIS — M81.6 LOCALIZED OSTEOPOROSIS WITHOUT CURRENT PATHOLOGICAL FRACTURE: Primary | ICD-10-CM

## 2025-03-04 LAB
ALBUMIN SERPL-MCNC: 4.4 G/DL (ref 3.2–4.8)
ALBUMIN/GLOB SERPL: 1.8 {RATIO} (ref 1–2)
ALP LIVER SERPL-CCNC: 87 U/L
ALT SERPL-CCNC: 32 U/L
ANION GAP SERPL CALC-SCNC: 6 MMOL/L (ref 0–18)
AST SERPL-CCNC: 28 U/L (ref ?–34)
BILIRUB SERPL-MCNC: 0.5 MG/DL (ref 0.2–1.1)
BUN BLD-MCNC: 16 MG/DL (ref 9–23)
CALCIUM BLD-MCNC: 9.3 MG/DL (ref 8.7–10.6)
CHLORIDE SERPL-SCNC: 105 MMOL/L (ref 98–112)
CO2 SERPL-SCNC: 32 MMOL/L (ref 21–32)
CREAT BLD-MCNC: 0.9 MG/DL
EGFRCR SERPLBLD CKD-EPI 2021: 64 ML/MIN/1.73M2 (ref 60–?)
FASTING STATUS PATIENT QL REPORTED: NO
GLOBULIN PLAS-MCNC: 2.4 G/DL (ref 2–3.5)
GLUCOSE BLD-MCNC: 147 MG/DL (ref 70–99)
MAGNESIUM SERPL-MCNC: 1.6 MG/DL (ref 1.6–2.6)
OSMOLALITY SERPL CALC.SUM OF ELEC: 300 MOSM/KG (ref 275–295)
PHOSPHATE SERPL-MCNC: 1.9 MG/DL (ref 2.4–5.1)
POTASSIUM SERPL-SCNC: 3.8 MMOL/L (ref 3.5–5.1)
PROT SERPL-MCNC: 6.8 G/DL (ref 5.7–8.2)
PTH-INTACT SERPL-MCNC: 64.4 PG/ML (ref 18.5–88)
SODIUM SERPL-SCNC: 143 MMOL/L (ref 136–145)
VIT D+METAB SERPL-MCNC: 35.2 NG/ML (ref 30–100)

## 2025-03-04 PROCEDURE — 82306 VITAMIN D 25 HYDROXY: CPT

## 2025-03-04 PROCEDURE — 36415 COLL VENOUS BLD VENIPUNCTURE: CPT

## 2025-03-04 PROCEDURE — 84100 ASSAY OF PHOSPHORUS: CPT

## 2025-03-04 PROCEDURE — 82542 COL CHROMOTOGRAPHY QUAL/QUAN: CPT

## 2025-03-04 PROCEDURE — 84165 PROTEIN E-PHORESIS SERUM: CPT

## 2025-03-04 PROCEDURE — 80053 COMPREHEN METABOLIC PANEL: CPT

## 2025-03-04 PROCEDURE — 83521 IG LIGHT CHAINS FREE EACH: CPT

## 2025-03-04 PROCEDURE — G2211 COMPLEX E/M VISIT ADD ON: HCPCS | Performed by: INTERNAL MEDICINE

## 2025-03-04 PROCEDURE — 86334 IMMUNOFIX E-PHORESIS SERUM: CPT

## 2025-03-04 PROCEDURE — 99205 OFFICE O/P NEW HI 60 MIN: CPT | Performed by: INTERNAL MEDICINE

## 2025-03-04 PROCEDURE — 83735 ASSAY OF MAGNESIUM: CPT

## 2025-03-04 PROCEDURE — 83970 ASSAY OF PARATHORMONE: CPT

## 2025-03-04 PROCEDURE — 82330 ASSAY OF CALCIUM: CPT

## 2025-03-06 LAB
ALBUMIN SERPL ELPH-MCNC: 3.87 G/DL (ref 3.75–5.21)
ALBUMIN/GLOB SERPL: 1.6 {RATIO} (ref 1–2)
ALPHA1 GLOB SERPL ELPH-MCNC: 0.27 G/DL (ref 0.19–0.46)
ALPHA2 GLOB SERPL ELPH-MCNC: 0.79 G/DL (ref 0.48–1.05)
B-GLOBULIN SERPL ELPH-MCNC: 0.67 G/DL (ref 0.68–1.23)
GAMMA GLOB SERPL ELPH-MCNC: 0.69 G/DL (ref 0.62–1.7)
KAPPA LC FREE SER-MCNC: 1.36 MG/DL (ref 0.33–1.94)
KAPPA LC FREE/LAMBDA FREE SER NEPH: 0.88 {RATIO} (ref 0.26–1.65)
LAMBDA LC FREE SERPL-MCNC: 1.55 MG/DL (ref 0.57–2.63)
PROT SERPL-MCNC: 6.3 G/DL (ref 5.7–8.2)

## 2025-03-08 LAB — PTH-RELATED PEPTIDE: <2 PMOL/L

## 2025-03-10 NOTE — PROGRESS NOTES
Results released to [employer]. If not reviewed within 7 days, will go to the result pool for staff to follow up

## 2025-03-14 ENCOUNTER — TELEPHONE (OUTPATIENT)
Facility: CLINIC | Age: 80
End: 2025-03-14

## 2025-03-14 NOTE — TELEPHONE ENCOUNTER
Received call from Dr. Vinson Dentist- will be faxing over clearance with note similar to below    Situation: has one tooth with loss of bone on bottom left, first molar, been like this for a few years, no infection currently and is not bothering patient at this time.    However Dentist can't confirm if this will be an issue in the future.     RN reviewed possible treatment- Prolia, twice yearly and ongoing. Forteo once daily for two years.    States we can call him with additional questions after choosing treatment.     Routed for review.

## 2025-03-17 NOTE — TELEPHONE ENCOUNTER
BRISEYDA CARRILLO spoke with  at dental office [phone]. She stated she will have dentist call the office when possible    Will await input from dentist on choice of treatment given chronic dental issues before starting Rx    Dental clearance letter placed in basket for scanning

## 2025-03-18 ENCOUNTER — TELEPHONE (OUTPATIENT)
Facility: CLINIC | Age: 80
End: 2025-03-18

## 2025-03-18 NOTE — TELEPHONE ENCOUNTER
Hi,     Could you please ask the patient if she has completed her crown Rx and whether she prefers to do Prolia once very 6 months or Forteo which is once daily injections?     I was able to speak with her dentist, they stated it was ok to start Rx and they will continue monitor clinically for ONJ     Thank you!

## 2025-03-19 ENCOUNTER — APPOINTMENT (OUTPATIENT)
Age: 80
End: 2025-03-19
Attending: INTERNAL MEDICINE
Payer: MEDICARE

## 2025-03-19 ENCOUNTER — OFFICE VISIT (OUTPATIENT)
Age: 80
End: 2025-03-19
Attending: INTERNAL MEDICINE
Payer: MEDICARE

## 2025-03-19 VITALS
BODY MASS INDEX: 23 KG/M2 | RESPIRATION RATE: 18 BRPM | DIASTOLIC BLOOD PRESSURE: 88 MMHG | TEMPERATURE: 97 F | HEIGHT: 60 IN | SYSTOLIC BLOOD PRESSURE: 147 MMHG | OXYGEN SATURATION: 98 %

## 2025-03-19 DIAGNOSIS — M81.6 LOCALIZED OSTEOPOROSIS WITHOUT CURRENT PATHOLOGICAL FRACTURE: ICD-10-CM

## 2025-03-19 DIAGNOSIS — Z17.0 MALIGNANT NEOPLASM OF UPPER-OUTER QUADRANT OF RIGHT BREAST IN FEMALE, ESTROGEN RECEPTOR POSITIVE (HCC): ICD-10-CM

## 2025-03-19 DIAGNOSIS — D50.0 IRON DEFICIENCY ANEMIA SECONDARY TO BLOOD LOSS (CHRONIC): ICD-10-CM

## 2025-03-19 DIAGNOSIS — C50.411 MALIGNANT NEOPLASM OF UPPER-OUTER QUADRANT OF RIGHT BREAST IN FEMALE, ESTROGEN RECEPTOR POSITIVE (HCC): ICD-10-CM

## 2025-03-19 LAB
BASOPHILS # BLD AUTO: 0.04 X10(3) UL (ref 0–0.2)
BASOPHILS NFR BLD AUTO: 0.6 %
DEPRECATED HBV CORE AB SER IA-ACNC: 181 NG/ML
EOSINOPHIL # BLD AUTO: 0.09 X10(3) UL (ref 0–0.7)
EOSINOPHIL NFR BLD AUTO: 1.4 %
ERYTHROCYTE [DISTWIDTH] IN BLOOD BY AUTOMATED COUNT: 13.4 %
HCT VFR BLD AUTO: 39.7 %
HGB BLD-MCNC: 13.1 G/DL
IMM GRANULOCYTES # BLD AUTO: 0.02 X10(3) UL (ref 0–1)
IMM GRANULOCYTES NFR BLD: 0.3 %
LYMPHOCYTES # BLD AUTO: 1.43 X10(3) UL (ref 1–4)
LYMPHOCYTES NFR BLD AUTO: 22.4 %
MCH RBC QN AUTO: 32.3 PG (ref 26–34)
MCHC RBC AUTO-ENTMCNC: 33 G/DL (ref 31–37)
MCV RBC AUTO: 97.8 FL
MONOCYTES # BLD AUTO: 0.6 X10(3) UL (ref 0.1–1)
MONOCYTES NFR BLD AUTO: 9.4 %
NEUTROPHILS # BLD AUTO: 4.19 X10 (3) UL (ref 1.5–7.7)
NEUTROPHILS # BLD AUTO: 4.19 X10(3) UL (ref 1.5–7.7)
NEUTROPHILS NFR BLD AUTO: 65.9 %
PLATELET # BLD AUTO: 251 10(3)UL (ref 150–450)
RBC # BLD AUTO: 4.06 X10(6)UL
WBC # BLD AUTO: 6.4 X10(3) UL (ref 4–11)

## 2025-03-19 NOTE — TELEPHONE ENCOUNTER
Received call from patient and son Willis- reviewed message from provider.    [employer] sent for review.    Patient and son to then follow up with what they are preferring.

## 2025-03-19 NOTE — PROGRESS NOTES
Cancer Center Progress Note    Problem List:      Patient Active Problem List   Diagnosis    De Quervain's syndrome (tenosynovitis)    Breast cancer metastasized to axillary lymph node (HCC)    Other specified counseling    Dyspnea on exertion    COPD (chronic obstructive pulmonary disease) (HCC)    Post herpetic neuralgia    Localized osteoporosis without current pathological fracture    Iron deficiency anemia secondary to blood loss (chronic)    Other fatigue due to anemia    Heartburn    Malignant neoplasm of upper-outer quadrant of right breast in female, estrogen receptor positive (HCC)    Acute diverticulitis of intestine    Coronary artery disease involving native coronary artery    Diverticulosis of large intestine without perforation or abscess without bleeding    Intestinal fistula    Lower abdominal pain    Malignant neoplasm of upper-outer quadrant of female breast (HCC)         History of Present Illness  Geovanna Butcher is an 79 year old female with right breast cancer who presents for routine follow-up.    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 Her estrogen receptor was 10%, progesterone receptor 0%, and HER2 was 3+. She completed TCH chemotherapy in August 2017, had a lumpectomy in September 2017 with a pathologic complete response, and completed a year of trastuzumab in February 2018. She also completed five years of letrozole. Currently, there is no evidence of recurrence.    She experiences persistent pain described as 'all over' and 'like electricity going through' the nipple and breast area. She questions if this is normal many years post-surgery and is unsure if the pain is worsening, attributing it to aging.    She has osteoporosis with a 6% decrease in bone density over the past three to four years. She has been on Zometa, which was deemed ineffective, and has previously used denosumab (Prolia) with improvement.  She is considering options for osteoporosis management.    She has a history of diverticulitis and reports ongoing bowel problems, initially thought to be diverticulitis but later identified as diverticuli without active inflammation. She experiences constant pain and is concerned about the possibility of cancer recurrence, although recent scans have not indicated any liver involvement.    She is currently not anemic, with her CBC showing normal levels. She receives monthly B12 injections due to a previously low B12 level identified in December. She has had two shots so far.      Review of Systems:   Constitutional: Negative for fevers, chills, night sweats and weight loss.  Eyes: Negative for visual disturbance, irritation and redness.  Cardiovascular: Negative for angina, orthopnea or palpitations.  Gastrointestinal: Negative for nausea, vomiting, change in bowel habits, diarrhea, constipation and abdominal pain.  Integument/breast: Negative for rash, skin lesions, and pruritus.  Hematologic/lymphatic: Negative for easy bruising, bleeding, and lymphadenopathy.  Musculoskeletal: Negative for myalgias, arthralgias, muscle weakness.  Genitourinary: Negative for dysuria or hematuria  Psychiatric: The patient's mood was calm and appropriate for this visit.  The pertinent positives and negatives were described. All other systems were negative.    PMH/PSH:  Past Medical History:    Abdominal distention    Abdominal pain    Acute, but ill-defined, cerebrovascular disease    Anemia    Anxiety    Arthritis    Back pain    Back problem    Belching    Bloating    Breast cancer (HCC)    Chest pain    Chest pain on exertion    Constipation    COPD (chronic obstructive pulmonary disease) (HCC)    Dizziness    Easy bruising    Esophageal reflux    Exposure to medical diagnostic radiation    Fatigue    Flatulence/gas pain/belching    Hearing loss    Heartburn    Hemorrhoids    High blood pressure    High cholesterol    History of  blood transfusion    Indigestion    Irregular bowel habits    Loss of appetite    Nausea    Night sweats    Osteoporosis    Personal history of antineoplastic chemotherapy    Rash    Scoliosis    Shortness of breath    Sleep disturbance    Stroke (HCC)    TIA 12/2016    Uncomfortable fullness after meals    Visual impairment    glasses       Past Surgical History:   Procedure Laterality Date    Bypass surgery      Chemotherapy  2017    Colonoscopy N/A 05/03/2019    Procedure: COLONOSCOPY;  Surgeon: Farhan Zabala DO;  Location:  ENDOSCOPY    Ir port a cath procedure Left 04/2017    Lumpectomy right  09/2017    [facility]    Needle biopsy right  03/2017    US guided bx    Needle biopsy right Right 04/2017    US guided bx - [facility]    Radiation right  2017    Sent lymph node biopsy      Tubal ligation         Family History Reviewed:  Family History   Problem Relation Age of Onset    Other (multiple myeloma) Sister     Breast Cancer Self 71    Stroke Father     Hypertension Mother     Stroke Brother        Allergies:     Allergies[1]    Medications:   polyethylene glycol, PEG 3350, 17 g Oral Powd Pack Take 17 g by mouth every other day.      TRELEGY ELLIPTA 200-62.5-25 MCG/ACT Inhalation Aerosol Powder, Breath Activated Inhale 1 puff into the lungs daily. 3 each 3    rOPINIRole 0.5 MG Oral Tab Take 2 tablets (1 mg) every night. 60 tablet 1    albuterol 108 (90 Base) MCG/ACT Inhalation Aero Soln Inhale 2 puffs into the lungs every 4 (four) hours as needed. inhale 2 puff by inhalation route  every 4 - 6 hours as needed 1 each 11    famotidine 40 MG Oral Tab Take 1 tablet (40 mg total) by mouth at bedtime. 90 tablet 3    torsemide 20 MG Oral Tab Take 1 tablet (20 mg total) by mouth daily.      LORazepam 0.5 MG Oral Tab Take 1 tablet (0.5 mg total) by mouth as needed.      Potassium Chloride ER 20 MEQ Oral Tab CR Take 1 tablet by mouth daily.      Multiple Vitamins-Minerals (MULTIVITAMIN ADULT) Oral Chew Tab Chew 1 tablet by  mouth daily.      Irbesartan 75 MG Oral Tab Take 1 tablet (75 mg total) by mouth as needed. Depending on bp      metoprolol succinate  MG Oral Tablet 24 Hr Take 1 tablet (100 mg total) by mouth daily.      atorvastatin 80 MG Oral Tab Take 1 tablet (80 mg total) by mouth nightly.      Clopidogrel Bisulfate 75 MG Oral Tab Take 1 tablet (75 mg total) by mouth daily.           Vital Signs:      Height: 152.4 cm (5') (03/19 1024)  Weight: --  BSA (Calculated - sq m): --  Pulse: --  BP: 147/88 (03/19 1024)  Temp: 97.3 °F (36.3 °C) (03/19 1024)  Do Not Use - Resp Rate: --  SpO2: 98 % (03/19 1024)      Performance Status:  ECOG 1: Restricted in physically strenuous activity but ambulatory and able to carry out work of a light or sedentary nature.     Physical Examination:      Constitutional: Patient is alert and oriented x 3, not in acute distress.  Respiratory: Clear to auscultation and percussion. No rales.  No wheezes.  Cardiovascular: Regular rate and rhythm. No murmurs.  Gastrointestinal: Soft, non tender with good bowel sounds.  Musculoskeletal: No edema. No calf tenderness.  Lymphatics: There is no palpable lymphadenopathy throughout in the cervical, supraclavicular, or axillary regions.  Psychiatric: The patient's mood is calm and appropriate for this visit.       Results reviewed at this visit:     Lab Results   Component Value Date    WBC 6.4 03/19/2025    RBC 4.06 03/19/2025    HGB 13.1 03/19/2025    HCT 39.7 03/19/2025    MCV 97.8 03/19/2025    MCH 32.3 03/19/2025    MCHC 33.0 03/19/2025    RDW 13.4 03/19/2025    .0 03/19/2025     Lab Results   Component Value Date     03/04/2025    K 3.8 03/04/2025     03/04/2025    CO2 32.0 03/04/2025    BUN 16 03/04/2025    CREATSERUM 0.90 03/04/2025     (H) 03/04/2025    CA 9.3 03/04/2025    ALKPHO 87 03/04/2025    ALT 32 03/04/2025    AST 28 03/04/2025    BILT 0.5 03/04/2025    ALB 4.4 03/04/2025    ALB 3.87 03/04/2025    TP 6.8 03/04/2025     TP 6.3 03/04/2025       Results  LABS  CBC: not anemic (03/19/2025)  B12: low (12/2024)    PATHOLOGY  Estrogen receptor: 10%  Progesterone receptor: 0%  HER2: 3+         Assessment & Plan  Infiltrating ductal carcinoma of the upper outer right breast  Right axillary node metastases  ER 10%  NE 0%  HER2 3+  Treatment related diarrhea and abdominal cramping  Treatment related nausea  TCH x 6 cycles completed in 8/2017  Lumpectomy on 9/8/2017 with pathologic CR.  Herceptin completed on 2/14/2018     Infiltrating ductal carcinoma of the upper outer right breast with right axillary node metastases. Completed TCH chemotherapy, lumpectomy with pathologic complete response, and a year of trastuzumab. Also completed five years of letrozole. The risk of recurrence is low, estimated at 1% given the complete response and time elapsed since treatment. Current symptoms of pain are likely neuropathic and related to surgery, not indicative of cancer recurrence. The risk of recurrence is lower than the risk of other health issues such as pneumonia, stroke, or myocardial infarction.  - Continue routine follow-up for breast cancer.    Osteoporosis  Osteoporosis with a 6% decrease in bone density over the past 3-4 years. Zometa has been ineffective. The endocrinologist recommended considering Prolia or Forteo. Prolia is favored for ease of administration (subcutaneous injection twice a year). She expresses concern about potential fractures and medication side effects. The endocrinologist's expertise is relied upon for the best management strategy.  - Discontinue Zometa.  - Consult with endocrinologist to decide between Prolia and Forteo.  - Consider Prolia for ease of administration.    Iron deficiency anemia  Iron deficiency anemia with gastrointestinal blood loss. Current CBC shows no anemia, but iron levels are pending. Receiving monthly B12 injections due to low B12 levels, likely due to absorption issues.  - Monitor iron levels  and consider iron infusion if levels are low.  - Continue monthly B12 injections.    Chronic abdominal pain  Chronic abdominal pain with a history of diverticulitis. Recent scans show diverticuli but no active diverticulitis. The pain is not typical of breast cancer metastasis.  - Follow up with gastroenterologist for further evaluation.    Follow-up  Well-managed with no immediate concerns regarding breast cancer recurrence. Ongoing management of osteoporosis and anemia is necessary.  - Schedule follow-up appointment in 6 months with blood work.           The following individual(s) verbally consented to be recorded using ambient AI listening technology and understand that they can each withdraw their consent to this listening technology at any point by asking the clinician to turn off or pause the recording:    Patient name: Geovanna Butcher  Additional names:  Vaibhav Chaudhary MD          [1] No Known Allergies

## 2025-03-20 NOTE — TELEPHONE ENCOUNTER
Note per additional Telephone Encounter:   Spoke with Dr Nando Cristina DDS regarding choice of anti -resorptives Prolia vs forteo given aforementioned dental problems given risk of ONJ  Per the dentist, patient has chronic dental problems, follows with dentist regularly and they do not anticipate acute upcoming dental work  While patient cannot be cleared from dental standpoint, per dentist, it would be okay to start Rx w/ either medications and they will monitor patient per dental protocol for ONJ  Will ask patient regarding her preference and start Rx after she completes dental work          Awaiting decision from patient and son.

## 2025-07-02 ENCOUNTER — HOSPITAL ENCOUNTER (OUTPATIENT)
Dept: CT IMAGING | Facility: HOSPITAL | Age: 80
Discharge: HOME | End: 2025-07-02
Attending: INTERNAL MEDICINE
Payer: MEDICARE

## 2025-07-02 DIAGNOSIS — J43.9 PULMONARY EMPHYSEMA, UNSPECIFIED EMPHYSEMA TYPE (HCC): ICD-10-CM

## 2025-07-02 PROCEDURE — 71250 CT THORAX DX C-: CPT | Performed by: INTERNAL MEDICINE

## 2025-07-11 ENCOUNTER — OFFICE VISIT (OUTPATIENT)
Facility: CLINIC | Age: 80
End: 2025-07-11
Payer: MEDICARE

## 2025-07-11 VITALS
HEIGHT: 60 IN | HEART RATE: 61 BPM | RESPIRATION RATE: 16 BRPM | BODY MASS INDEX: 22.78 KG/M2 | SYSTOLIC BLOOD PRESSURE: 126 MMHG | OXYGEN SATURATION: 90 % | DIASTOLIC BLOOD PRESSURE: 74 MMHG | WEIGHT: 116 LBS

## 2025-07-11 DIAGNOSIS — R91.1 PULMONARY NODULE: ICD-10-CM

## 2025-07-11 DIAGNOSIS — J44.9 CHRONIC OBSTRUCTIVE PULMONARY DISEASE, UNSPECIFIED COPD TYPE (HCC): Primary | ICD-10-CM

## 2025-07-11 PROCEDURE — 99214 OFFICE O/P EST MOD 30 MIN: CPT | Performed by: INTERNAL MEDICINE

## 2025-07-11 RX ORDER — NITROGLYCERIN 0.4 MG/1
TABLET SUBLINGUAL
COMMUNITY
Start: 2025-06-06

## 2025-07-11 RX ORDER — PANTOPRAZOLE SODIUM 40 MG/1
40 TABLET, DELAYED RELEASE ORAL DAILY
COMMUNITY
Start: 2025-05-13

## 2025-07-11 RX ORDER — CYANOCOBALAMIN 1000 UG/ML
INJECTION, SOLUTION INTRAMUSCULAR; SUBCUTANEOUS
COMMUNITY
Start: 2025-01-30

## 2025-07-11 RX ORDER — TRIAMCINOLONE ACETONIDE 1 MG/G
1 OINTMENT TOPICAL 2 TIMES DAILY
COMMUNITY
Start: 2025-06-27

## 2025-07-11 RX ORDER — POTASSIUM CHLORIDE 1500 MG/1
20 TABLET, EXTENDED RELEASE ORAL EVERY OTHER DAY
COMMUNITY
Start: 2025-04-16

## 2025-07-11 NOTE — PROGRESS NOTES
Pulmonary Progress Note    History of Present Illness:  Geovanna Butcher is a 79 year old female presenting to pulmonary clinic copd  Notes increased dyspnea -   Did rehab- - unable to get to rehab-now  did in [place]  Doing some of the exercises with weights -   Walks outside if able-   2l with treadmill -   sats all good - - 93 % if walking   Hands cold-   trelegy - daily -   Some chest pain- - got nebulizer only used once-   Not using much - rare use rescue -   Not struggling - in the am - only with activity -  -   No coughing -   Had echo and holter - izabela   Fell and had CT brain     Not better not worse -   Did rehab- walks as able now - gets tired- rare cough at most   Has to stop and rest- vaccuuming - -   Rash comes and goes - for 2 years - then returns -   Cant change heart meds   Saw cardio- no changes -     12.23- no UC /ER   Sick few months ago -bad cold -  thinks got antibiotics - and thinks prednisone - though for sciatica   Harder to breath - thinks related to cold weather- when active-   At times rolling in bed and other times able to vacuum- if gets gloomy then gets sad and more shortness of breath   No coughing - thinks cp is gas -- left- at times comes and goes   Had another procedure -- for chest pain- - with patent SVG LVEDP 5mmHg   Trelegy once a day 200-   If takes albuterol - makes HR high -- so avoids - - can't tell if helps breathing -   Has nebulizer - thinks plain albuterol   No coughing -   O2 at rest and at night - 2l - if walking - 3l and has to stop and rest more - had to sit down   At home 93%- at rest   Finished rehab - 9 months - remains with exercise- pushups and walks - not as much - 4000 steps -   7/24- more dyspnea - feels need for more oxygen - abi valentine- -   2 weeks ago very short of breath - was using albuterol - - -   Hosp with diverticulitis at [facility] - January   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- - told not well enough for surgery - - -  Saw dr coronado - aneurysm  recommended  [facility] -   Switched back to [facility] now   [facility] -   Desats to 80s -on 2l with new POC     - in dec with flare of diverticular disease - saw [place] - had CT -- not infected - - remains on diet soft diet and occasionally  to just liquids - dairrhea - now metamucil every other day alternating with cetricella - then to constipating -- now better   Breathing  oK sitting - - with walking - gets very winded - occasionally  up to 4l - and has to stop and rest - in the lobby - stopps exercise with abd pain-   3000 steps inside and at times tries to walk outside - and up out of the chair- - did rehab - -   Not a lot of coughing - rare   Dr gurrola cardio   Dr coronado - due to see -   Hx avms -   Broke a tooth - wants to stop zometa -   Remains on trelegy 200- uses albuterol-      - had ct -- not read -   Had divertiulitis and was admitted - had colonscopy   Now has shingles - 3rd time - - left flank-- rash then black and blue- -  too late for meds - - not antivirus   Diverticuli - itis resolved- told not a cadidate for surgery   Chronic LLQ pain - ongoing   No new heart issues -- on injection -- to help chol   Breathing sl worse - humitidyt -- can't walk as far   Remains active in house - has to stop - can't breath   Can't catch breath -- was walking 4 blocks now only 2 blocks - doing upper arm -   Some coughing   Frog in throat in morning - - - not awakneing during the night -   Trelegy daily - - rescue when humide -- when walking -             Social History:   Social History     Socioeconomic History    Marital status:     Number of children: 6   Tobacco Use    Smoking status: Former     Current packs/day: 0.00     Average packs/day: 1 pack/day for 40.0 years (40.0 ttl pk-yrs)     Types: Cigarettes     Start date: 1977     Quit date: 2017     Years since quittin.8     Passive exposure: Past    Smokeless tobacco: Former     Quit date: 2013   Substance and Sexual Activity    Alcohol  use: Not Currently     Alcohol/week: 2.0 standard drinks of alcohol     Types: 2 Glasses of wine per week    Drug use: No   Other Topics Concern    Caffeine Concern Yes     Comment: COFFEE    Exercise No        Medications:   Current Outpatient Medications   Medication Sig Dispense Refill    cyanocobalamin 1000 MCG/ML Injection Solution Inject 1 mL every month by subcutaneous route.      nitroglycerin 0.4 MG Sublingual SL Tab DISSOLVE 1 TABLET UNDER THE TONGUE AS NEEDED FOR CHEST PAIN. MAY REPEAT EVERY 5 MINUTES IF STILL HAVING CHEST PAIN- TO MAX OF 3 TABLETS PER      pantoprazole 40 MG Oral Tab EC Take 1 tablet (40 mg total) by mouth daily.      potassium chloride 20 MEQ Oral Tab CR Take 1 tablet (20 mEq total) by mouth every other day.      triamcinolone 0.1 % External Ointment Apply 1 Application topically 2 (two) times daily.      TRELEGY ELLIPTA 200-62.5-25 MCG/ACT Inhalation Aerosol Powder, Breath Activated Inhale 1 puff into the lungs daily. 3 each 3    rOPINIRole 0.5 MG Oral Tab Take 2 tablets (1 mg) every night. 60 tablet 1    albuterol 108 (90 Base) MCG/ACT Inhalation Aero Soln Inhale 2 puffs into the lungs every 4 (four) hours as needed. inhale 2 puff by inhalation route  every 4 - 6 hours as needed 1 each 11    torsemide 20 MG Oral Tab Take 1 tablet (20 mg total) by mouth daily.      LORazepam 0.5 MG Oral Tab Take 1 tablet (0.5 mg total) by mouth as needed.      Potassium Chloride ER 20 MEQ Oral Tab CR Take 1 tablet by mouth daily.      Multiple Vitamins-Minerals (MULTIVITAMIN ADULT) Oral Chew Tab Chew 1 tablet by mouth daily.      Irbesartan 75 MG Oral Tab Take 1 tablet (75 mg total) by mouth as needed. Depending on bp      metoprolol succinate  MG Oral Tablet 24 Hr Take 1 tablet (100 mg total) by mouth daily.      atorvastatin 80 MG Oral Tab Take 1 tablet (80 mg total) by mouth nightly.      Clopidogrel Bisulfate 75 MG Oral Tab Take 1 tablet (75 mg total) by mouth daily.      polyethylene glycol,  PEG 3350, 17 g Oral Powd Pack Take 17 g by mouth every other day.      famotidine 40 MG Oral Tab Take 1 tablet (40 mg total) by mouth at bedtime. 90 tablet 3    dicyclomine 20 MG Oral Tab Take 1 tablet (20 mg total) by mouth 4 (four) times daily as needed. (Patient not taking: Reported on 7/11/2025) 15 tablet 0       Review of Systems:    Weight stable - down 2 # - down another -5# - stable since   Eating well overall    Denies any difficulty swallowing or significant coughing while eating--  Denies any specific chest pain-- told gas   Thinks continuing to lose height- thinks ongoing losing -- was to have bone denisty   - no swelling at all   Thnks off PPI and on pepcid   Denies any significant reflux or heartburn  Has had no lower extremity edema denies claudication symptoms  Denies claudication   Overall thinks sleeping with difficulty 1-2 nights per week -   Falls asleep then awakenings -- frequent - using O2   Increased leg jumping at night --much better with Requip-denies hangover      Physical Exam:  /74   Pulse 61   Resp 16   Ht 5' (1.524 m)   Wt 116 lb (52.6 kg)   SpO2 90%   BMI 22.65 kg/m²    Constitutional: comfortable . No acute distress.   HEENT: Head NC/AT. PEERL. Throat is clear posterior thrush noted mild  Increasing KS   Cardio: .  Regular rate and rhythm short murmurs noted  Respiratory: Diminished tones with minimal crackles at both bases no auscultated wheeze good air entry- some crackles   GI:  Abd soft, non-tender.  Extremities: No clubbing .  No evidence of edema no tenderness  Neurologic: A&Ox3. No gross motor deficits.  Skin: Warm, dry.no rashes or hives noted   Lymphatic: No cervical or supraclavicular lymphadenopathy.  No JVD at 90 degrees  Psych: Calm, cooperative. Pleasant affect.    Results: reviewed     Assessment/Plan:  #GLO  Refuses sleep study  Remains on O2  6.23- remains an issue and remains with fatigue - not willing to discuss   12.23- notes RLS-- had been on requip-  agrees to retry -   7/24- takes most nights- and helps   1/25-  takes requip helps - or sleeping pill and with O2   7/25 - takes requip as needed - once a month - ativan as needed       #Coronary artery disease  Increasing dyspnea plans for cath at [facility] but had MRI of the night prior  CABG for triple-vessel disease October 2020  Follows with Dr. Contreras at [place]  5/21 ordered PET scan reports stable remains with dyspnea on exertion  8/21 cardiac PET with reversible anterior ischemia--repeat cath and told too small  Denies angina  8/22 no new issues  1/23 saw Dr. Contreras and was assessed with increasing dyspnea states had negative Holter and negative echo and was told heart was fine  6.23- no changes   12.23 recent re-cath - with patent SVGs- and normal ef reportedly   7/24- no new issues - dr contreras -no changes on recent visit  1/25- did not like [facility]- to make sure izabela- aware of AAA   7/25 - no new issues           #Pulmonary hypertension  Etiology unclear questionably chemo related now improved  Remains with stable fluid status  PFTs 4/2019 without significant disease though severely reduced DLCO  Echo 7/2019 with improved PAS 43 mmHg down to 33 mmHg  1/21 now status post CABG follow-up with PAS of 35 mmHg and EF 55%-  8/21 no fluid issues and no diuretic  Recent echo at [place] with pulmonary hypertension mild lightheadedness-reports several caths at [facility] [place]  Remains without fluid issues  6.23- no fluid issues   7/24 repeat cath in December with LVEDP of 5-stable fluid status  1/25- stable fluid status   7/25 stable fluid status       #Lung cancer screening  50-pack-year plus quit 2017  4/2019 without issue  Remains with yearly screening  8/22 CT January-2022 for dyspnea no obvious lesion  1/23 screening CT with 3.5 mm left upper lobe new nodule plan for short interval follow-up-discussed with patient and son at length  6/2308-yhddil-el with resolved nodule no new issues-for yearly  7/24 yearly with no  worrisome lesions stable subpleural left lower lobe-plan for yearly  Lung Cancer Counseling and Shared Decision Making Session in an Asymptomatic Smoker/Former Smoker   Geovanna Butcher is a 79 year old female without current symptoms of lung cancer.  History   Smoking Status    Former    Types: Cigarettes   Smokeless Tobacco    Former    Quit date: 9/27/2013        She received information on the importance of adherence to annual lung cancer Low Dose CT (LDCT) screening, the impact of her comorbidities and her ability or willingness to undergo diagnosis and treatment. she is in agreement and an order will be placed for CT LUNG LD SCREENING(CPT=71271).    We counseled the importance of maintaining cigarette smoking abstinence if she is a former smoker and the importance of smoking cessation if she is a current smoker and we discussed and furnished information about tobacco cessation interventions.  7.24- knows for yearly   1/25- for repeat in July -to assess for evolving ILD component- past screening   7/25 CT not yet read--by my review nodule seems stable no new ones awaiting official reading        #GE reflux  Resumed PPI denies breakthrough  Told needs to take PPI daily for need for nonsteroidal for her back pain  3/21 still with episodes of breakthrough now on sucralfate taking 3 times daily  8/21 complains of constipation then diarrhea-Daily MiraLAX  1/23 denies any new issues  6.23- remains on ppi daily   1/25- divertiuclar disease ongoing and off PPI and on pepcid   7/25 - remains on tums as needed    #Anemia due to chronic GI blood loss  Hemoglobin to 7.7--found to have a polyp and AVM--refused capsule study at that time  Follows with Dr. Chaudhary  No recent issues  1/25- due to see   7/25 - sees Dr. Chaudhary in August       #COPD  Initial flare with viral infection while in [place] requiring bronchoscopy at that time with Candida treated with Diflucan  Ongoing mild airways obstruction better on medications  Chest  x-ray clear  PFTs 2019 with hyperinflation without obstruction though FVC was decreased--severely reduced DLCO  Pulmonary O2 from bypass surgery 2020  3/20 1 repeat PFTs with slightly decreased ratio 67% volumes down did not in the box  5/21 Trelegy was acceptable though noted increased urinary symptoms now back on Breo and Spiriva remains with dyspnea despite medications and O2  8/22 with prolonged flare December/January--CT was negative-finally better though states had gained 20 pounds with steroids-remains O2 dependent  Had COVID while in [place]  1/23 continues to note significant dyspnea on exertion and a worsening pattern did note significant benefit from rehab with plans to return to exercise as much as possible  Follow for need for repeat PFTs and check for desaturation  6.23- - walking some - able to be in the pool -encouraged to exercise more continue present management  7/24 now with increasing dyspnea and decreasing levels of activity-plan to titrate O2 upward continue full ICS/LABA/LAMA  Plan to check PFTs to see if possible candidate for valves  1/25- remains stable -TLC decreased suspect related to scolisis and ht loss - - to recheck CT in July -- not a candidate for valves   7/25 - less active in the summer- to walk in  a store for exercise discussed at length-ongoing component of restriction as well        #Pulmonary nodule  3.5 mm left upper lobe new from 1/13/2022  6.23 - resolved on CT   7/25 pending no obvious lesion    # rash -  Saw allergist- had blood work -  ? ozog   No clue - Claritin   7/25 no recrrunace now with thought shingles - left flank     # breast cancer - dr coronado   Due for mamms- no more mams with plans to follow-up with Dr. Sheikh for anemia          -Plan:   - continue  requip every night as needed  for restless leg syndrome               - continue trelegy daily and albuterol as needed               - consider more exercise - consider walking in [employer] -- try to check O2  sats with exercise please - OK to go up to 4-5l                      - ask dr coroando about bone health                    -- will let you know about CT scan     -                           - see me in 6 months   -  Mikayla Fisher MD  Pulmonary Medicine  7/11/25

## 2025-07-11 NOTE — PATIENT INSTRUCTIONS
Plan:   - continue  requip every night as needed  for restless leg syndrome               - continue trelegy daily and albuterol as needed               - consider more exercise - consider walking in [employer] -- try to check O2 sats with exercise please - OK to go up to 4-5l                      - ask dr coronado about bone health                    -- will let you know about CT scan     -                           - see me in 6 months   -  Mikayla Fisher MD  Pulmonary Medicine  7/11/25

## 2025-07-22 ENCOUNTER — TELEPHONE (OUTPATIENT)
Facility: CLINIC | Age: 80
End: 2025-07-22

## 2025-07-22 NOTE — TELEPHONE ENCOUNTER
Per Dr. Fisher:    \"Can you please call her and let her know that her CAT scan looks good with no evidence of worrisome lesions or extra fluid.\"     Patient called, left detailed message with the above information. Advised to call back if has questions.

## 2025-08-22 ENCOUNTER — TELEPHONE (OUTPATIENT)
Facility: CLINIC | Age: 80
End: 2025-08-22

## (undated) DIAGNOSIS — G47.33 OBSTRUCTIVE SLEEP APNEA (ADULT) (PEDIATRIC): Primary | ICD-10-CM

## (undated) DEVICE — SOLUTION ANSEP 70% ISOPRPNL

## (undated) DEVICE — SNARE CAPTIFLEX MICRO-OVL OLY

## (undated) DEVICE — GLOVE SURG SENSICARE SZ 6

## (undated) DEVICE — REMOVER DURAPREP 3M

## (undated) DEVICE — 3M(TM) MICROPORE TAPE DISPENSER 1535-2: Brand: 3M™ MICROPORE™

## (undated) DEVICE — Device: Brand: DEFENDO AIR/WATER/SUCTION AND BIOPSY VALVE

## (undated) DEVICE — GLOVE SURG SENSICARE SZ 6-1/2

## (undated) DEVICE — SUTURE VICRYL 3-0 SH

## (undated) DEVICE — PAIN TRAY: Brand: MEDLINE INDUSTRIES, INC.

## (undated) DEVICE — MEDI-VAC SUCTION HANDLE REGULAR CAPACITY: Brand: CARDINAL HEALTH

## (undated) DEVICE — GLOVE SURG SENSICARE SZ 7-1/2

## (undated) DEVICE — SUTURE SILK 0 FSL

## (undated) DEVICE — GLOVE BIOGEL M SURG SZ 71/2

## (undated) DEVICE — NEEDLE SPINAL 22X3-1/2 BLK

## (undated) DEVICE — CONT SPEC SURG FAXITRON WEDGE

## (undated) DEVICE — 3M™ RED DOT™ MONITORING ELECTRODE WITH FOAM TAPE AND STICKY GEL, 50/BAG, 20/CASE, 72/PLT 2570: Brand: RED DOT™

## (undated) DEVICE — BREAST-HERNIA-PORT CDS-LF: Brand: MEDLINE INDUSTRIES, INC.

## (undated) DEVICE — STERILE (15.2 X 244CM) POLYETHYLENE TELESCOPICALLY-FOLDED COVER WITH ATTACHED (3CM) NEOGUARD™ TIP: Brand: SURGI-TIP TRANSDUCER COVER

## (undated) DEVICE — EXACTO SNARE

## (undated) DEVICE — SYRINGE 10ML LL TIP

## (undated) DEVICE — FIAPC® PROBE W/ FILTER 2200 A OD 2.3MM/6.9FR; L 2.2M/7.2FT: Brand: ERBE

## (undated) DEVICE — SUPER SPONGES,MEDIUM: Brand: KERLIX

## (undated) DEVICE — MEDI-VAC NON-CONDUCTIVE SUCTION TUBING: Brand: CARDINAL HEALTH

## (undated) DEVICE — BANDAID COVERLET 1X3

## (undated) DEVICE — REM POLYHESIVE ADULT PATIENT RETURN ELECTRODE: Brand: VALLEYLAB

## (undated) DEVICE — KENDALL SCD EXPRESS SLEEVES, KNEE LENGTH, MEDIUM: Brand: KENDALL SCD

## (undated) DEVICE — FILTERLINE NASAL ADULT O2/CO2

## (undated) DEVICE — 1200CC GUARDIAN II: Brand: GUARDIAN

## (undated) DEVICE — SOL  .9 1000ML BTL

## (undated) DEVICE — TOWEL: OR BLU 80/CS: Brand: MEDICAL ACTION INDUSTRIES

## (undated) DEVICE — MARKER PRE-SURGICAL MINI DISP

## (undated) DEVICE — ENDOSCOPY PACK - LOWER: Brand: MEDLINE INDUSTRIES, INC.

## (undated) DEVICE — CLIP RESOLUTION 235CM

## (undated) DEVICE — ENDOSCOPY PACK UPPER: Brand: MEDLINE INDUSTRIES, INC.

## (undated) DEVICE — SUTURE VICRYL 4-0 PC-1

## (undated) DEVICE — GOWN,SIRUS,FABRIC-REINFORCED,X-LARGE: Brand: MEDLINE

## (undated) DEVICE — SPECIMEN CONTAINER,POSITIVE SEAL INDICATOR, OR PACKAGED: Brand: PRECISION

## (undated) DEVICE — TRAP 4 CPTR CHMBR N EZ INLN

## (undated) NOTE — LETTER
17    Patient: Sara Kaplan  : 1945 Visit date: 2017    Dear  Dr. Padma Zavala,    Thank you for referring Sara Kaplan to my practice. Please find my assessment and plan below.       BREAST MRI   I personally viewed the films and agre 1.9 x 1.5 cm, now up to 0.4 cm and contains a biopsy clip. BIRADS Code 6: RIGHT BREAST. KNOWN BIOPSY PROVEN MALIGNANCY.     RECOMMENDATIONS:    FOLLOW-UP: Surgical consultation       PLEASE NOTE:  A NORMAL MRI DOES NOT EXCLUDE THE POSSIBILITY OF BREAST participating in the ALLIANCE trial.  I have asked the research nurse to come and discuss the details of the trial further with her.   · Currently, the patient would like to proceed with x-ray localized lumpectomy, sentinel lymph node biopsy, possible total

## (undated) NOTE — ED AVS SNAPSHOT
Kathie Sanabria   MRN: [id number]    Department:  [facility] Emergency Department   Date of Visit:  4/18/2019           Disclosure     Insurance plans vary and the physician(s) referred by the ER may not be covered by your plan.  Please contact you tell this physician (or your personal doctor if your instructions are to return to your personal doctor) about any new or lasting problems. The primary care or specialist physician will see patients referred from the [facility] Emergency Department.  Sumeet Stein

## (undated) NOTE — MR AVS SNAPSHOT
After Visit Summary   6/16/2017    Stephen Garcia    MRN: [id number]           Diagnoses this Visit     Breast cancer metastasized to axillary lymph node, right ([address]    -  Primary     Diarrhea with dehydration           Allergies     No Known Masood For medical emergencies, dial 911.

## (undated) NOTE — ED AVS SNAPSHOT
[facility] Emergency Department    [place]  [address] [address]    Phone:  [phone]    Fax:  [phone]           Sumeet Olson   MRN: [id number]    Department:  [facility] Emergency Department   Date of Visit:  5/20 Commonly known as:  ZOFRAN-ODT   Take 1 tablet (4 mg total) by mouth every 4 (four) hours as needed for Nausea.          CONTINUE taking these medications     Spacer/Aero Chamber Mouthpiece Misc   Quantity:  1 each   Patient needs spacer to use inhaler for tell this physician (or your personal doctor if your instructions are to return to your personal doctor) about any new or lasting problems. The primary care or specialist physician will see patients referred from the [facility] Emergency Department.  Celia Castro - If you are a smoker or have smoked in the last 12 months, we encourage you to explore options for quitting.     - If you have concerns related to behavioral health issues or thoughts of harming yourself, contact [facility] a

## (undated) NOTE — Clinical Note
2017    Patient: Artemio Doran  : 1945 Visit date: 2017    Dear  Dr. Awa Diaz,    Thank you for referring Artemio Doran to my practice. Please find my assessment and plan below.         Diagnostic bilateral mammogram    3/20/2017 situations where radiation therapy is needed after mastectomy (large tumor size, positive lymph nodes and positive margins on a mastectomy).   The final decision regarding raditation therapy would be made after surgery, and she expresses understanding of th

## (undated) NOTE — Clinical Note
I had the pleasure of seeing Lisseth Manju on 9/12/2017. Please see my attached note.   Jacquie Goode MD FACS EM[facility]--Surgery

## (undated) NOTE — ED AVS SNAPSHOT
[facility] Emergency Department    [facility]  [address] [address]    Phone:  [phone]    Fax:  [phone]           Meg Moreno   MRN: [id number]    Department:  [facility] Emergency Department   Date of Visit:  5/20 IF THERE IS ANY CHANGE OR WORSENING OF YOUR CONDITION, CALL YOUR PRIMARY CARE PHYSICIAN AT ONCE OR RETURN IMMEDIATELY TO THE EMERGENCY DEPARTMENT.     If you have been prescribed any medication(s), please fill your prescription right away and begin taking t

## (undated) NOTE — MR AVS SNAPSHOT
After Visit Summary   5/26/2017    Barbara Cortés    MRN: [id number]           Diagnoses this Visit     Diarrhea with dehydration    -  Primary     Breast cancer metastasized to axillary lymph node, right ([address]         Hypokalemia           Allergi cells lining the intestine. To help limit this problem, try the tips on this handout. For breakfast, try eating toasted white bread and a banana. Tips for controlling diarrhea  · Limit the amount of fiber in your diet.  Avoid high-fiber foods such as Appointment with Addie Gaspar at [facility] in [place] ([phone])   [address] [facility] [address] [facility] [address]       Monday June 05, 2017 11:00 AM     Appointment with Freda Tejeda at [facility] CO2 26.0  22.0-32.0  mmol/L Final         Result Summary for MAGNESIUM      Component Results     Component Value Flag Ref Range Units Status    Magnesium 1.5 (L) 1.7-3.0  mg/dL Final         Result Summary for CBC W/ DIFFERENTIAL      Component Results

## (undated) NOTE — MR AVS SNAPSHOT
After Visit Summary   6/5/2017    Gisele Turk    MRN: [id number]           Diagnoses this Visit     Breast cancer metastasized to axillary lymph node, right ([address]    -  Primary       Allergies     No Known Allergies      Your Vital Signs Were Appointment with [facility] ECHO RM 1 at [facility] Echocardiography ([phone])   Please wear slacks and top for your comfort - do not wear a dress. Please arrive 15 minutes prior to your scheduled time. [place]  [address] Component Value Flag Ref Range Units Status    WBC 16.7 (H) 4.0-13.0  x10(3) uL Final    RBC 2.99 (L) 3.80-5.10  x10(6)uL Final    HGB 9.2 (L) 12.0-16.0  g/dL Final    HCT 28.1 (L) 34.0-50.0  % Final    .0  150.0-450.0  10(3)uL Final    MCV 94.0  8

## (undated) NOTE — LETTER
[facility] Testing Department  Phone: [phone]  OUTSIDE TESTING RESULT REQUEST      TO:   DR Esha Del Castillo Date: 8/29/17    FAX #: [phone]     IMPORTANT: FOR YOUR IMMEDIATE ATTENTION  Please FAX all test results listed below to: [phone]

## (undated) NOTE — Clinical Note
We need to call Miguel Angel Trujillo and have her come in for Infed. Also I need her to see GI for work up for iron deficiency anemia. Maybe we should have her come in for the iron next week and also to see me to discuss.  Mary Wade

## (undated) NOTE — MR AVS SNAPSHOT
After Visit Summary   5/15/2017    Emili Fernández    MRN: [id number]           Diagnoses this Visit     Breast cancer metastasized to axillary lymph node, right ([address]    -  Primary       Allergies     No Known Allergies      Your Vital Signs Were Monday June 05, 2017 10:45 AM     Appointment with July Puga at [facility] in [place] ([phone])   [address] [facility] [place] [address]       Monday June 05, 2017 11:00 AM     Appointment with Freda Tejeda at [facility] Bilirubin, Total 0.3  0.1-2.0  mg/dL Final    Total Protein 6.5  6.1-8.3  g/dL Final    Albumin 3.2 (L) 3.5-4.8  g/dL Final    Sodium 140  136-144  mmol/L Final    Potassium 3.6  3.6-5.1  mmol/L Final    Chloride 106  101-111  mmol/L Final    CO2 26.0  22 For medical emergencies, dial 911.

## (undated) NOTE — MR AVS SNAPSHOT
After Visit Summary   5/25/2017    Cinthia Kraus    MRN: [id number]           Diagnoses this Visit     Lower abdominal pain    -  Primary     Abdominal pain         Breast cancer metastasized to axillary lymph node, unspecified laterality ([facility] Component Value Flag Ref Range Units Status    Glucose 92  70-99  mg/dL Final    BUN 11  8-20  mg/dL Final    Creatinine 1.29 (H) 0.55-1.02  mg/dL Final    GFR 41 (L) >=60   Final    Comment:       Estimated GFR units: mL/min/1.73 square meters   eGFR meena

## (undated) NOTE — MR AVS SNAPSHOT
After Visit Summary   4/18/2017    Gisele Turk    MRN: [id number]           Diagnoses this Visit     Breast cancer metastasized to axillary lymph node, right [facility])    -  Primary     Other specified counseling           Allergies     No Known All 8:00AM-4:30PM Monday thru Friday.    [address] [address]       Monday April 24, 2017 8:30 AM     Appointment with Nnamdi Mcgee at [address] [facility] ([phone])   [address]  [address] [address]

## (undated) NOTE — MR AVS SNAPSHOT
After Visit Summary   6/5/2017    Will Sotelo    MRN: [id number]           Diagnoses this Visit     Breast cancer metastasized to axillary lymph node, right ([facility] [address]    -  Primary       Allergies     No Known Allergies      Your Vital Signs Were [address] [address]       Thursday June 08, 2017 10:30 AM     Appointment with Manohar Padilla at [facility] in [place] ([phone])   [address]  [facility] [address] [facility] [address]       Friday June 09, 2017 1:30 PM     Appoint

## (undated) NOTE — MR AVS SNAPSHOT
After Visit Summary   4/27/2017    Cecille Rausch    MRN: [id number]           Diagnoses this Visit     Malignant neoplasm of upper-outer quadrant of right female breast, unspecified estrogen receptor status    -  Primary     Chronic obstructive pu office, you can view your past visit information in [employer] by going to Visits < Visit Summaries. [employer] questions? Call [phone] for help. [employer] is NOT to be used for urgent needs. For medical emergencies, dial 911.

## (undated) NOTE — LETTER
[facility] [address], [address]  Consent for Procedure/Sedation    Date:     Time:       1. I authorize the performance upon Dejan Lopez the following:  VENOUS ACCESS PORT DYE STUDY     2.  I authorize Dr. Dom Arriaga (and whomever is ________________________________    ___________________    Witness: _________________________      Date: ___________________    Printed: 2018   11:59 AM  Patient Name: Medina Saenz        : 1945       Medical Record #: [id number]

## (undated) NOTE — MR AVS SNAPSHOT
After Visit Summary   6/12/2017    Kevyn Labs    MRN: [id number]           Diagnoses this Visit     Breast cancer metastasized to axillary lymph node, right ([address]    -  Primary       Allergies     No Known Allergies      Your Vital Signs Were ---------                               -----------         ------                     CBC W/ DIFFERENTIAL[[id number]]          Abnormal            Final result                 Please view results for these tests on the individual orders.          Result Sum Visits < Visit Summaries. MyChart questions? Call [phone] for help. [employer] is NOT to be used for urgent needs. For medical emergencies, dial 911.

## (undated) NOTE — MR AVS SNAPSHOT
After Visit Summary   6/9/2017    Meg Moreno    MRN: [id number]           Diagnoses this Visit     Breast cancer metastasized to axillary lymph node, right ([address]    -  Primary     Diarrhea with dehydration           Allergies     No Known Aller Call [phone] for help. [employer] is NOT to be used for urgent needs. For medical emergencies, dial 911.

## (undated) NOTE — MR AVS SNAPSHOT
After Visit Summary   6/8/2017    Moreno Ortiz    MRN: [id number]           Diagnoses this Visit     Diarrhea with dehydration    -  Primary     Breast cancer metastasized to axillary lymph node, right ([address]           Allergies     No Known Aller Summaries. If you've been to the Emergency Department or your doctor's office, you can view your past visit information in [employer] by going to Visits < Visit Summaries. [employer] questions? Call [phone] for help.   [employer] is NOT to be used for urge

## (undated) NOTE — LETTER
[facility] [address], [address]  Consent for Procedure/Sedation    Date:     Time:       1. I authorize the performance upon Dejan Lopez the following:  VENOUS ACCESS PORT REMOVAL     2.  I authorize Dr. Christopher Hassan (and whomever ________________________________    ___________________    Witness: _________________________      Date: ___________________    Printed: 2018   10:09 AM  Patient Name: Pankaj Garrett        : 1945       Medical Record #: [id number]

## (undated) NOTE — MR AVS SNAPSHOT
After Visit Summary   5/11/2017    Gulshan Fergusonraghav    MRN: [id number]           Diagnoses this Visit     Breast cancer metastasized to axillary lymph node, right [facility])    -  Primary     Breast cancer metastasized to axillary lymph node, unspecified Calcium, Total 8.9  8.3-10.3  mg/dL Final    Comment:       Total Calciums are not corrected for effects of low albumin. If needed, use the following correction formula.       Corrected Calcium Formula:      ((4.0 - Albumin) x 0.8 + Calcium    Note: Calcul Eosinophil Absolute 0.08  0.00-0.30  x10(3) uL Final    Basophil Absolute 0.04  0.00-0.10  x10(3) uL Final    Immature Granulocyte Absolute 0.04  0.00-1.00  x10(3) uL Final    Neutrophil % 67.7   % Final    Lymphocyte % 24.7   % Final    Monocyte % 6.3

## (undated) NOTE — Clinical Note
I had the pleasure of seeing Erenest Fear on 8/29/2017. Please see my attached note.   Juan Mcdonough MD FACS [facility]--Surgery

## (undated) NOTE — Clinical Note
John Walker,  This patient has mild anemia with decreased ferritin. She had Infed in 2021. I wrote for another dose. Please call and let her know her iron has decreased again and we need to schedule another dose of iron.  Ashley Corrales

## (undated) NOTE — LETTER
17    Patient: Vanesa Crane  : 1945 Visit date: 2017    Dear  Dr. Marcie Byrd,    I had the pleasure of seeing Vanesa Crane in the office today. Please find my assessment and plan below.       ASSESSMENT/PLAN:   Carcinoma of right angelina

## (undated) NOTE — LETTER
Date: 2019  Patient Name:  Jase Hernandez             Address: [address] [address] [address]    : 1945      Dear Yadira Rios,      I hope this letter finds you well. The polyp found during your colonoscopy is called an adenoma.  This is

## (undated) NOTE — MR AVS SNAPSHOT
After Visit Summary   4/24/2017    Barbara Cortés    MRN: [id number]           Diagnoses this Visit     Breast cancer metastasized to axillary lymph node, right ([address]    -  Primary       Allergies     No Known Allergies      Your Vital Signs Were Result Summary for COMP METABOLIC PANEL (14)      Component Results     Component Value Flag Ref Range Units Status    Glucose 127 (H) 70-99  mg/dL Final    BUN 13  8-20  mg/dL Final    Creatinine 0.69  0.55-1.02  mg/dL Final    GFR 87  >=60   Final Immature Granulocyte Absolute 0.05  0.00-1.00  x10(3) uL Final    Neutrophil % 86.9   % Final    Lymphocyte % 7.9   % Final    Monocyte % 4.8   % Final    Eosinophil % 0.0   % Final    Basophil % 0.1   % Final    Immature Granulocyte % 0.3   % Final

## (undated) NOTE — LETTER
[facility] [place] Testing Department  Phone: [phone]  Right Fax: [phone]  ABNORMAL VALUES FAX    Sent By:  Luis E Valerio RN Date: 8/29/17    Patient Name: Lily Small  Surgery Date: 9/8/2017    CSN: [id number]  Medical Record: [id number]

## (undated) NOTE — MR AVS SNAPSHOT
After Visit Summary   4/11/2017    Sumeet Olson    MRN: [id number]           Diagnoses this Visit     Breast cancer of upper-outer quadrant of right female breast [facility])    -  Primary     Encounter for antineoplastic chemotherapy           Allergi GASTRECTOMY HEMATURIA KIDNEY MASS KIDNEY STONES PANCREAS/PANCREATITIS PANCREATIC MASS RENAL ARTERY RETROPERITONEAL BLEED RENAL CALCULUS/CALCULI RENAL MASS RENAL STONES SPLENIC ARTERY UROGRAM (DR. Ranjeet Spicer)  Patients with all other diagnoses will drink or this amount within 25 minutes. Start drinking 1/2 of the second bottle 30 minutes to your appointment time. Try and drink this amount within 15 minutes. Drink the last 1/2 of the second bottle 15 minutes before your appointment time.   You may experience lo If you have questions, you can call [phone] to talk to our [facility] Staff. Remember, [employer] is NOT to be used for urgent needs. For medical emergencies, dial 911.

## (undated) NOTE — MR AVS SNAPSHOT
After Visit Summary   5/25/2017    Artemio Doran    MRN: [id number]           Diagnoses this Visit     Breast cancer metastasized to axillary lymph node, right ([address]    -  Primary     Diarrhea with dehydration         Hypokalemia         Abdominal Friday May 26, 2017     LAB:  MAGNESIUM        Monday June 05, 2017 10:45 AM     Appointment with Edna Rosen at [facility] in [place] ([phone])   [address] [place] [address]       Monday June 05, 2017 11:

## (undated) NOTE — MR AVS SNAPSHOT
After Visit Summary   5/15/2017    Doc Saenz    MRN: [id number]           Diagnoses this Visit     Breast cancer metastasized to axillary lymph node, right ([address]    -  Primary       Allergies     No Known Allergies      Your Vital Signs Were

## (undated) NOTE — MR AVS SNAPSHOT
After Visit Summary   6/8/2017    Bernice Kelly    MRN: [id number]           Allergies     No Known Allergies      Your Vital Signs Were     Smoking Status                   Former Smoker           Your Current Medications        Dosage    lisinop Please wear slacks and top for your comfort - do not wear a dress. Please arrive 15 minutes prior to your scheduled time. [place]  [address] [address]            MyChart     Visit [facility]  You can access your MyChart to more actively ma

## (undated) NOTE — MR AVS SNAPSHOT
After Visit Summary   5/1/2017    Salvatorebryce Delarosa    MRN: [id number]           Diagnoses this Visit     Breast cancer metastasized to axillary lymph node, right ([address]    -  Primary       Allergies     No Known Allergies      Your Vital Signs Were Please view results for these tests on the individual orders.          Result Summary for CBC W/ DIFFERENTIAL      Component Results     Component Value Flag Ref Range Units Status    WBC 53.3 (H) 4.0-13.0  x10(3) uL Final    RBC 4.18  3.80-5.10  x10(6)uL F Metamyelocyte Absolute Manual 0.53 (H) <0.01  x10(3) uL Final    Myelocyte Absolute Manual 0.53 (H) <0.01  x10(3) uL Final    Neutrophils % Manual 32   % Final    Band % 34   % Final    Lymphocyte % Manual 18   % Final    Monocyte % Manual 14   % Final

## (undated) NOTE — Clinical Note
Printed: 2017    Patient Name: Sumeet Olson  : 1945   Medical Record #: [id number]    Consent to Chemotherapy    I, Sumeet Olson, understand that I have been diagnosed with breast cancer.     I understand that the treatment suggested by Patient Signature                                                            Date      For patients requiring translation or verbal reading of this document, the person reading/translating should document and sign below:    [place]/ Signature

## (undated) NOTE — MR AVS SNAPSHOT
After Visit Summary   5/1/2017    Miguel Angel Trujillo    MRN: [id number]           Diagnoses this Visit     Leg pain    -  Primary     Leg cramping         Breast cancer metastasized to axillary lymph node, right ([facility])           Allergies     No Known A [address] [address]       Monday May 15, 2017 10:45 AM     Appointment with Jaime Merino at [facility] in [place] ([phone])   [address] [facility] [address] [address] [place] [address]            Result Summary for MAGNESIUM      Componen

## (undated) NOTE — Clinical Note
I had the pleasure of seeing Artemio Doran on 4/11/2017. Please see my attached note.   Ginger Wallace MD FACS [facility]--Surgery

## (undated) NOTE — MR AVS SNAPSHOT
After Visit Summary   5/11/2017    Lisseth Nunes    MRN: [id number]           Diagnoses this Visit     Dehydration    -  Primary     Lower abdominal pain         Breast cancer metastasized to axillary lymph node, unspecified laterality ([facility] If you've recently had a stay at the Hospital you can access your discharge instructions in [employer] by going to Visits < Admission Summaries.  If you've been to the Emergency Department or your doctor's office, you can view your past visit information in My

## (undated) NOTE — MR AVS SNAPSHOT
After Visit Summary   4/24/2017    Percy Rod    MRN: [id number]           Diagnoses this Visit     Breast cancer metastasized to axillary lymph node, right ([address]    -  Primary       Allergies     No Known Allergies      Your Vital Signs Were Appointment with Freda Tejeda at [address] [facility] ([phone])   [address] [place] [address]       Monday May 15, 2017 10:45 AM     Appointment with Dino Bruce at [facility] in [place]